# Patient Record
Sex: MALE | Race: BLACK OR AFRICAN AMERICAN | NOT HISPANIC OR LATINO | URBAN - METROPOLITAN AREA
[De-identification: names, ages, dates, MRNs, and addresses within clinical notes are randomized per-mention and may not be internally consistent; named-entity substitution may affect disease eponyms.]

---

## 2019-03-04 ENCOUNTER — INPATIENT (INPATIENT)
Facility: HOSPITAL | Age: 49
LOS: 1 days | Discharge: HOME | End: 2019-03-06
Attending: INTERNAL MEDICINE | Admitting: INTERNAL MEDICINE
Payer: MEDICAID

## 2019-03-04 VITALS
SYSTOLIC BLOOD PRESSURE: 147 MMHG | DIASTOLIC BLOOD PRESSURE: 92 MMHG | HEART RATE: 101 BPM | TEMPERATURE: 98 F | OXYGEN SATURATION: 100 % | RESPIRATION RATE: 18 BRPM

## 2019-03-04 DIAGNOSIS — R06.09 OTHER FORMS OF DYSPNEA: ICD-10-CM

## 2019-03-04 DIAGNOSIS — Z98.890 OTHER SPECIFIED POSTPROCEDURAL STATES: Chronic | ICD-10-CM

## 2019-03-04 DIAGNOSIS — D72.829 ELEVATED WHITE BLOOD CELL COUNT, UNSPECIFIED: ICD-10-CM

## 2019-03-04 DIAGNOSIS — R07.89 OTHER CHEST PAIN: ICD-10-CM

## 2019-03-04 DIAGNOSIS — R09.89 OTHER SPECIFIED SYMPTOMS AND SIGNS INVOLVING THE CIRCULATORY AND RESPIRATORY SYSTEMS: ICD-10-CM

## 2019-03-04 DIAGNOSIS — Z29.9 ENCOUNTER FOR PROPHYLACTIC MEASURES, UNSPECIFIED: ICD-10-CM

## 2019-03-04 LAB
ALBUMIN SERPL ELPH-MCNC: 4.2 G/DL — SIGNIFICANT CHANGE UP (ref 3.3–5)
ALP SERPL-CCNC: 86 U/L — SIGNIFICANT CHANGE UP (ref 40–120)
ALT FLD-CCNC: 39 U/L — SIGNIFICANT CHANGE UP (ref 4–41)
ANION GAP SERPL CALC-SCNC: 13 MMO/L — SIGNIFICANT CHANGE UP (ref 7–14)
AST SERPL-CCNC: 50 U/L — HIGH (ref 4–40)
B PERT DNA SPEC QL NAA+PROBE: NOT DETECTED — SIGNIFICANT CHANGE UP
BASOPHILS # BLD AUTO: 0.15 K/UL — SIGNIFICANT CHANGE UP (ref 0–0.2)
BASOPHILS NFR BLD AUTO: 0.8 % — SIGNIFICANT CHANGE UP (ref 0–2)
BILIRUB SERPL-MCNC: 1 MG/DL — SIGNIFICANT CHANGE UP (ref 0.2–1.2)
BUN SERPL-MCNC: 7 MG/DL — SIGNIFICANT CHANGE UP (ref 7–23)
C PNEUM DNA SPEC QL NAA+PROBE: NOT DETECTED — SIGNIFICANT CHANGE UP
CALCIUM SERPL-MCNC: 9.2 MG/DL — SIGNIFICANT CHANGE UP (ref 8.4–10.5)
CHLORIDE SERPL-SCNC: 103 MMOL/L — SIGNIFICANT CHANGE UP (ref 98–107)
CO2 SERPL-SCNC: 27 MMOL/L — SIGNIFICANT CHANGE UP (ref 22–31)
CREAT SERPL-MCNC: 1.05 MG/DL — SIGNIFICANT CHANGE UP (ref 0.5–1.3)
D DIMER BLD IA.RAPID-MCNC: 760 NG/ML — SIGNIFICANT CHANGE UP
EOSINOPHIL # BLD AUTO: 0.52 K/UL — HIGH (ref 0–0.5)
EOSINOPHIL NFR BLD AUTO: 2.7 % — SIGNIFICANT CHANGE UP (ref 0–6)
FLUAV H1 2009 PAND RNA SPEC QL NAA+PROBE: NOT DETECTED — SIGNIFICANT CHANGE UP
FLUAV H1 RNA SPEC QL NAA+PROBE: NOT DETECTED — SIGNIFICANT CHANGE UP
FLUAV H3 RNA SPEC QL NAA+PROBE: NOT DETECTED — SIGNIFICANT CHANGE UP
FLUAV SUBTYP SPEC NAA+PROBE: NOT DETECTED — SIGNIFICANT CHANGE UP
FLUBV RNA SPEC QL NAA+PROBE: NOT DETECTED — SIGNIFICANT CHANGE UP
GLUCOSE SERPL-MCNC: 102 MG/DL — HIGH (ref 70–99)
HADV DNA SPEC QL NAA+PROBE: NOT DETECTED — SIGNIFICANT CHANGE UP
HCOV PNL SPEC NAA+PROBE: SIGNIFICANT CHANGE UP
HCT VFR BLD CALC: 39.9 % — SIGNIFICANT CHANGE UP (ref 39–50)
HGB BLD-MCNC: 14.5 G/DL — SIGNIFICANT CHANGE UP (ref 13–17)
HMPV RNA SPEC QL NAA+PROBE: NOT DETECTED — SIGNIFICANT CHANGE UP
HPIV1 RNA SPEC QL NAA+PROBE: NOT DETECTED — SIGNIFICANT CHANGE UP
HPIV2 RNA SPEC QL NAA+PROBE: NOT DETECTED — SIGNIFICANT CHANGE UP
HPIV3 RNA SPEC QL NAA+PROBE: NOT DETECTED — SIGNIFICANT CHANGE UP
HPIV4 RNA SPEC QL NAA+PROBE: NOT DETECTED — SIGNIFICANT CHANGE UP
IMM GRANULOCYTES NFR BLD AUTO: 1.3 % — SIGNIFICANT CHANGE UP (ref 0–1.5)
LYMPHOCYTES # BLD AUTO: 20.1 % — SIGNIFICANT CHANGE UP (ref 13–44)
LYMPHOCYTES # BLD AUTO: 3.81 K/UL — HIGH (ref 1–3.3)
MCHC RBC-ENTMCNC: 31.4 PG — SIGNIFICANT CHANGE UP (ref 27–34)
MCHC RBC-ENTMCNC: 36.3 % — HIGH (ref 32–36)
MCV RBC AUTO: 86.4 FL — SIGNIFICANT CHANGE UP (ref 80–100)
MONOCYTES # BLD AUTO: 2.07 K/UL — HIGH (ref 0–0.9)
MONOCYTES NFR BLD AUTO: 10.9 % — SIGNIFICANT CHANGE UP (ref 2–14)
NEUTROPHILS # BLD AUTO: 12.19 K/UL — HIGH (ref 1.8–7.4)
NEUTROPHILS NFR BLD AUTO: 64.2 % — SIGNIFICANT CHANGE UP (ref 43–77)
NRBC # FLD: 0.36 K/UL — LOW (ref 25–125)
NRBC FLD-RTO: 1.9 — SIGNIFICANT CHANGE UP
NT-PROBNP SERPL-SCNC: 20.01 PG/ML — SIGNIFICANT CHANGE UP
PLATELET # BLD AUTO: 318 K/UL — SIGNIFICANT CHANGE UP (ref 150–400)
PMV BLD: 9.8 FL — SIGNIFICANT CHANGE UP (ref 7–13)
POTASSIUM SERPL-MCNC: 4.1 MMOL/L — SIGNIFICANT CHANGE UP (ref 3.5–5.3)
POTASSIUM SERPL-SCNC: 4.1 MMOL/L — SIGNIFICANT CHANGE UP (ref 3.5–5.3)
PROT SERPL-MCNC: 7.2 G/DL — SIGNIFICANT CHANGE UP (ref 6–8.3)
RBC # BLD: 4.62 M/UL — SIGNIFICANT CHANGE UP (ref 4.2–5.8)
RBC # FLD: 15.8 % — HIGH (ref 10.3–14.5)
RSV RNA SPEC QL NAA+PROBE: NOT DETECTED — SIGNIFICANT CHANGE UP
RV+EV RNA SPEC QL NAA+PROBE: NOT DETECTED — SIGNIFICANT CHANGE UP
SODIUM SERPL-SCNC: 143 MMOL/L — SIGNIFICANT CHANGE UP (ref 135–145)
TROPONIN T, HIGH SENSITIVITY: 8 NG/L — SIGNIFICANT CHANGE UP (ref ?–14)
TROPONIN T, HIGH SENSITIVITY: 9 NG/L — SIGNIFICANT CHANGE UP (ref ?–14)
WBC # BLD: 18.98 K/UL — HIGH (ref 3.8–10.5)
WBC # FLD AUTO: 18.98 K/UL — HIGH (ref 3.8–10.5)

## 2019-03-04 PROCEDURE — 71046 X-RAY EXAM CHEST 2 VIEWS: CPT | Mod: 26

## 2019-03-04 PROCEDURE — 74177 CT ABD & PELVIS W/CONTRAST: CPT | Mod: 26

## 2019-03-04 PROCEDURE — 71275 CT ANGIOGRAPHY CHEST: CPT | Mod: 26

## 2019-03-04 RX ORDER — ASPIRIN/CALCIUM CARB/MAGNESIUM 324 MG
81 TABLET ORAL DAILY
Qty: 0 | Refills: 0 | Status: DISCONTINUED | OUTPATIENT
Start: 2019-03-04 | End: 2019-03-06

## 2019-03-04 RX ORDER — ENOXAPARIN SODIUM 100 MG/ML
40 INJECTION SUBCUTANEOUS EVERY 24 HOURS
Qty: 0 | Refills: 0 | Status: DISCONTINUED | OUTPATIENT
Start: 2019-03-04 | End: 2019-03-06

## 2019-03-04 RX ORDER — ASPIRIN/CALCIUM CARB/MAGNESIUM 324 MG
325 TABLET ORAL ONCE
Qty: 0 | Refills: 0 | Status: COMPLETED | OUTPATIENT
Start: 2019-03-04 | End: 2019-03-04

## 2019-03-04 RX ORDER — PANTOPRAZOLE SODIUM 20 MG/1
40 TABLET, DELAYED RELEASE ORAL
Qty: 0 | Refills: 0 | Status: DISCONTINUED | OUTPATIENT
Start: 2019-03-04 | End: 2019-03-06

## 2019-03-04 RX ORDER — INFLUENZA VIRUS VACCINE 15; 15; 15; 15 UG/.5ML; UG/.5ML; UG/.5ML; UG/.5ML
0.5 SUSPENSION INTRAMUSCULAR ONCE
Qty: 0 | Refills: 0 | Status: COMPLETED | OUTPATIENT
Start: 2019-03-04 | End: 2019-03-06

## 2019-03-04 RX ADMIN — Medication 325 MILLIGRAM(S): at 07:59

## 2019-03-04 NOTE — H&P ADULT - NEGATIVE OPHTHALMOLOGIC SYMPTOMS
no loss of vision R/no blurred vision L/no photophobia/no pain R/no loss of vision L/no blurred vision R/no pain L/no diplopia

## 2019-03-04 NOTE — ED PROVIDER NOTE - CLINICAL SUMMARY MEDICAL DECISION MAKING FREE TEXT BOX
Pt presents with likely GERD like sx, abd pain though non-tender exam, endorsing hiccups after eating, denies any cp however has dyspnea on exertion and significant t wave inversions in lateral leads without any prior comparison. Will admit for further cardiac testing in the setting of anginal equivalent and ekg changes. HD stable, afebrile, well appearing with normal pe.  Nati Solo, PGY-2 EM

## 2019-03-04 NOTE — ED PROVIDER NOTE - NS ED ROS FT
General: No fevers / chills  HENT: No head trauma, ear pain, runny nose, or sore throat  Eyes: No visual changes  CP: +chest pain, palpitations, or lightheadedness  Resp: No shortness of breath, no cough  GI: No abdominal pain, diarrhea, constipation, nausea, or vomiting  : No urinary fz, dysuria, or hematuria  Neuro: No numbness, tingling, or weakness

## 2019-03-04 NOTE — H&P ADULT - ATTENDING COMMENTS
patient seen and evaluated.  Patient presents with complains of epigastric discomfort which has resolved and exertional shortness of breath.  Patient was noted to have leukocytosis without any evidence of infection.  His white count has decreased.  Given patient's electrocardiographic findings, I would like to ensure that there is no underlying coronary artery disease.  Will obtain a transthoracic echocardiogram and stress myocardial perfusion imaging.

## 2019-03-04 NOTE — H&P ADULT - NEUROLOGICAL DETAILS
alert and oriented x 3/sensation intact/cranial nerves intact/responds to pain/responds to verbal commands/normal strength

## 2019-03-04 NOTE — ED PROVIDER NOTE - OBJECTIVE STATEMENT
48 yo M denies pmh, last saw a pcp 1 year ago, non-smoker, no ETOH use presents 2/2 abd pain, especially after eating. Pt endorses 50 yo M denies pmh, last saw a pcp 1 year ago, non-smoker, no ETOH use presents 2/2 abd pain, especially after eating. Pt endorses the pain feels like a burning sensation after meals, believes it to be sx of regurgitation, also has hiccups regularly. Of note, pt reports dyspnea on exertion that has been going on for the last few months, used to workout regularly but no longer. Denies any chest pain or shortness of breath at rest. Denies any cardiac workup in the past including stress test. Patient denies ha, loc, back pain, n/v/d, urinary symptoms, recent travel and sickness.

## 2019-03-04 NOTE — H&P ADULT - NEGATIVE GASTROINTESTINAL SYMPTOMS
no diarrhea/no flatulence/no constipation/no abdominal pain/no melena/no hematochezia/no change in bowel habits

## 2019-03-04 NOTE — ED ADULT TRIAGE NOTE - CHIEF COMPLAINT QUOTE
p/w chest pain since Saturday.  States pain is sharp and non radiating.  denies SOB.  denies pmhx.  ekg in progress

## 2019-03-04 NOTE — H&P ADULT - PROBLEM SELECTOR PLAN 1
Dyspnea on exertion with decreased exercise tolerance  EKG with diffuse T wave inversions and ST elevation V2  Delta troponin negative  Start ASA 81mg daily  D-dimer ordered; will follow up  Given resting tachycardia, CT angio chest, abdomen and pelvis ordered to rule out PE and infection vs possibly malignancy  Echocardiogram ordered  Will consider ischemic evaluation pending above  F/U MD note

## 2019-03-04 NOTE — H&P ADULT - PROBLEM SELECTOR PLAN 3
Leukocytosis of unclear etiology  CXR without signs of infection  Pt without infectious complaints  Will send UA and RVP  Pt not septic appearing (HD stable), will hold off on blood cultures

## 2019-03-04 NOTE — ED PROVIDER NOTE - ATTENDING CONTRIBUTION TO CARE
agree with resident note  "50 yo M denies pmh, last saw a pcp 1 year ago, non-smoker, no ETOH use presents 2/2 abd pain, especially after eating. Pt endorses the pain feels like a burning sensation after meals, believes it to be sx of regurgitation, also has hiccups regularly. "  No prior cardiac workup.  States at times SOB    PE: well appearing; VSS; CTAB/L; s1 s2 no m/r/g abd soft/NT/ND ext: no edema    Imp: likely GERD but with EKG findings (concave morphology in septal leads) will admit for r/o ACS

## 2019-03-04 NOTE — H&P ADULT - HISTORY OF PRESENT ILLNESS
50 y/o male with no significant PMHx presents to ED with 50 y/o male with no significant PMHx presents to ED with epigastric pain and dyspnea on exertion. Pt reports that he has been complaining of intermittent, non-pleuritic, non-exertional, non-radiating, non-reproducible, post prandial epigastric/chest pain for the past two days, which is only affiliated with food intake. Pt does not admit to any exertional or pleuritic component, but does say that the pain is made worse when eating chicken and beef patties. Pt has never had a GI workup, including endoscopy, and never been diagnosed with GERD or PUD. Yesterday, after eating, pt had an episode of nausea with non-bloody and non-bilious vomiting. Pt also admits to worsening dyspnea on exertion over the past two months. Pt says that he is usually able to ambulate 20 blocks without a problem, but now has become short of breath after ambulating 3-4 blocks. His exercise tolerance has decreased and he feels like this is progressively getting worse. Pt denies fever, chills, sick contacts, recent travel, headache, dizziness, visual deficits, chest pain, shortness of breath at rest, cough, orthopnea, palpitations, abdominal pain, diarrhea, constipation, hematochezia, melena, dysuria, hematuria, LOC, syncope, peripheral edema. Upon arrival to ED, EKG: NSR at 97 bpm with TWI III, AVF, V3-V6, 1mm ST elevation V2. CE x2: Trop 9-->8. WBC: 18.98. AST: 50. ProBNP: 20.01. CXR: Clear lungs with trace right effusion. Pt is now admitted to telemetry.

## 2019-03-04 NOTE — H&P ADULT - NEGATIVE NEUROLOGICAL SYMPTOMS
no headache/no transient paralysis/no weakness/no focal seizures/no loss of consciousness/no hemiparesis/no confusion/no difficulty walking/no paresthesias/no syncope/no tremors/no generalized seizures/no vertigo/no loss of sensation

## 2019-03-04 NOTE — H&P ADULT - ASSESSMENT
48 y/o male with no significant PMHx presents to ED with post prandial epigastric pain radiating to the throat and dyspnea on exertion associated with an abnormal EKG.

## 2019-03-04 NOTE — ED ADULT NURSE NOTE - OBJECTIVE STATEMENT
Pt. received in room 25, A&Ox4, ambulatory. Pt. received in room 25, A&Ox4, ambulatory. Denies pmhx. Pt. c/o abdominal pain radiating to chest. Denies n/v/d, SOB, dizziness, weakness, cough, fever, chills. 20 gauge IV inserted in right ac, labs sent. Pt. placed on cardiac monitor. MD evaluation in progress. Respirations are even and unlabored on room air. Call bell within reach.

## 2019-03-04 NOTE — H&P ADULT - PROBLEM SELECTOR PLAN 2
Chest/epigastric pain concerning for GERD/PUD given post prandial component, however EKG concerning for ischemia  Delta troponin negative  Start Pantoprazole 40mg PO daily and Maalox PRN for presumed PUD  Start ASA 81mg PO daily for possible ischemia  Echocardiogram ordered  Hold off on GI consult at this time

## 2019-03-04 NOTE — H&P ADULT - NSHPLABSRESULTS_GEN_ALL_CORE
EKG: NSR at 97 bpm with TWI III, AVF, V3-V6, 1mm ST elevation V2  CE x1: Trop 9  WBC: 18.98  AST: 50  ProBNP: 20.01

## 2019-03-05 LAB
ANION GAP SERPL CALC-SCNC: 14 MMO/L — SIGNIFICANT CHANGE UP (ref 7–14)
BUN SERPL-MCNC: 9 MG/DL — SIGNIFICANT CHANGE UP (ref 7–23)
CALCIUM SERPL-MCNC: 9.1 MG/DL — SIGNIFICANT CHANGE UP (ref 8.4–10.5)
CHLORIDE SERPL-SCNC: 103 MMOL/L — SIGNIFICANT CHANGE UP (ref 98–107)
CHOLEST SERPL-MCNC: 110 MG/DL — LOW (ref 120–199)
CO2 SERPL-SCNC: 26 MMOL/L — SIGNIFICANT CHANGE UP (ref 22–31)
CREAT SERPL-MCNC: 1.03 MG/DL — SIGNIFICANT CHANGE UP (ref 0.5–1.3)
GLUCOSE SERPL-MCNC: 89 MG/DL — SIGNIFICANT CHANGE UP (ref 70–99)
HBA1C BLD-MCNC: 4 % — SIGNIFICANT CHANGE UP (ref 4–5.6)
HCT VFR BLD CALC: 40.1 % — SIGNIFICANT CHANGE UP (ref 39–50)
HDLC SERPL-MCNC: 35 MG/DL — SIGNIFICANT CHANGE UP (ref 35–55)
HGB BLD-MCNC: 14.2 G/DL — SIGNIFICANT CHANGE UP (ref 13–17)
LIPID PNL WITH DIRECT LDL SERPL: 55 MG/DL — SIGNIFICANT CHANGE UP
MAGNESIUM SERPL-MCNC: 1.7 MG/DL — SIGNIFICANT CHANGE UP (ref 1.6–2.6)
MCHC RBC-ENTMCNC: 31.6 PG — SIGNIFICANT CHANGE UP (ref 27–34)
MCHC RBC-ENTMCNC: 35.4 % — SIGNIFICANT CHANGE UP (ref 32–36)
MCV RBC AUTO: 89.3 FL — SIGNIFICANT CHANGE UP (ref 80–100)
NRBC # FLD: 0.41 K/UL — LOW (ref 25–125)
NRBC FLD-RTO: 2.6 — SIGNIFICANT CHANGE UP
PLATELET # BLD AUTO: 289 K/UL — SIGNIFICANT CHANGE UP (ref 150–400)
PMV BLD: 9.9 FL — SIGNIFICANT CHANGE UP (ref 7–13)
POTASSIUM SERPL-MCNC: 3.9 MMOL/L — SIGNIFICANT CHANGE UP (ref 3.5–5.3)
POTASSIUM SERPL-SCNC: 3.9 MMOL/L — SIGNIFICANT CHANGE UP (ref 3.5–5.3)
RBC # BLD: 4.49 M/UL — SIGNIFICANT CHANGE UP (ref 4.2–5.8)
RBC # FLD: 15.6 % — HIGH (ref 10.3–14.5)
SODIUM SERPL-SCNC: 143 MMOL/L — SIGNIFICANT CHANGE UP (ref 135–145)
TRIGL SERPL-MCNC: 226 MG/DL — HIGH (ref 10–149)
TSH SERPL-MCNC: 1.85 UIU/ML — SIGNIFICANT CHANGE UP (ref 0.27–4.2)
WBC # BLD: 15.54 K/UL — HIGH (ref 3.8–10.5)
WBC # FLD AUTO: 15.54 K/UL — HIGH (ref 3.8–10.5)

## 2019-03-05 PROCEDURE — 93018 CV STRESS TEST I&R ONLY: CPT | Mod: GC

## 2019-03-05 PROCEDURE — 93306 TTE W/DOPPLER COMPLETE: CPT | Mod: 26

## 2019-03-05 PROCEDURE — 93016 CV STRESS TEST SUPVJ ONLY: CPT | Mod: GC

## 2019-03-05 PROCEDURE — 78452 HT MUSCLE IMAGE SPECT MULT: CPT | Mod: 26

## 2019-03-05 RX ADMIN — Medication 81 MILLIGRAM(S): at 16:18

## 2019-03-05 RX ADMIN — ENOXAPARIN SODIUM 40 MILLIGRAM(S): 100 INJECTION SUBCUTANEOUS at 16:18

## 2019-03-06 ENCOUNTER — TRANSCRIPTION ENCOUNTER (OUTPATIENT)
Age: 49
End: 2019-03-06

## 2019-03-06 VITALS
SYSTOLIC BLOOD PRESSURE: 136 MMHG | HEART RATE: 86 BPM | OXYGEN SATURATION: 98 % | RESPIRATION RATE: 17 BRPM | TEMPERATURE: 98 F | DIASTOLIC BLOOD PRESSURE: 75 MMHG

## 2019-03-06 LAB
HCT VFR BLD CALC: 37.8 % — LOW (ref 39–50)
HGB BLD-MCNC: 13.7 G/DL — SIGNIFICANT CHANGE UP (ref 13–17)
MCHC RBC-ENTMCNC: 30.9 PG — SIGNIFICANT CHANGE UP (ref 27–34)
MCHC RBC-ENTMCNC: 36.2 % — HIGH (ref 32–36)
MCV RBC AUTO: 85.3 FL — SIGNIFICANT CHANGE UP (ref 80–100)
NRBC # FLD: 0.44 K/UL — LOW (ref 25–125)
NRBC FLD-RTO: 3.5 — SIGNIFICANT CHANGE UP
PLATELET # BLD AUTO: 302 K/UL — SIGNIFICANT CHANGE UP (ref 150–400)
PMV BLD: 9.7 FL — SIGNIFICANT CHANGE UP (ref 7–13)
RBC # BLD: 4.43 M/UL — SIGNIFICANT CHANGE UP (ref 4.2–5.8)
RBC # FLD: 16.2 % — HIGH (ref 10.3–14.5)
WBC # BLD: 12.67 K/UL — HIGH (ref 3.8–10.5)
WBC # FLD AUTO: 12.67 K/UL — HIGH (ref 3.8–10.5)

## 2019-03-06 RX ORDER — FAMOTIDINE 10 MG/ML
1 INJECTION INTRAVENOUS
Qty: 60 | Refills: 0 | OUTPATIENT
Start: 2019-03-06 | End: 2019-04-04

## 2019-03-06 RX ADMIN — Medication 81 MILLIGRAM(S): at 11:38

## 2019-03-06 RX ADMIN — PANTOPRAZOLE SODIUM 40 MILLIGRAM(S): 20 TABLET, DELAYED RELEASE ORAL at 05:36

## 2019-03-06 RX ADMIN — INFLUENZA VIRUS VACCINE 0.5 MILLILITER(S): 15; 15; 15; 15 SUSPENSION INTRAMUSCULAR at 16:01

## 2019-03-06 NOTE — DISCHARGE NOTE PROVIDER - CARE PROVIDER_API CALL
Dr Tena,   Phone: (   )    -  Fax: (   )    -  Follow Up Time:     Sam Borja (DO)  Cardiology; Internal Medicine  [address] [facility], [address]  [place] [address]  Phone: [phone]  Fax: [phone]  Follow Up Time:

## 2019-03-06 NOTE — DISCHARGE NOTE PROVIDER - HOSPITAL COURSE
48 y/o male with no significant PMHx presents to ED with post prandial epigastric pain radiating to the throat and dyspnea on exertion associated with an abnormal EKG.        + Post prandial epigastric pain- started Pantoprazole and Maalox PRN    + Dyspnea on exertion- EKG with diffuse TWI, CT angio negative, pending echo    + Leukocytosis- RVP, UA and CXR negative    EKG: NSR at 97 bpm with TWI III, AVF, V3-V6, 1mm ST elevation V2    CE x2: Trop 9-->8    WBC: 18.98    AST: 50    ProBNP: 20.01    D-dimer: 760    RVP: Negative        3/4 CXR: Clear lungs with trace right effusion.    3/4 CT angio chest, abdomen and pelvis: No pulmonary embolus. No acute intra-abdominal pathology.    3/5 ECHO- 1. Normal mitral valve. Minimal mitral regurgitation.    2. Normal left ventricular internal dimensions and wall    thicknesses.    3. Endocardium not well visualized; grossly normal left    ventricular systolic function.    4. The right ventricle is not well visualized; grossly    normal right ventricular systolic function.         Nuclear Stress Test-Exercise (03.05.19 @ 15:10)     PRESSIONS:Normal Study    * Myocardial Perfusion SPECT results are normal at 90 % of    MPHR.    * Review of raw data shows: The study is of good technical    quality.    * The left ventricle was normal in size. Normal myocardial    perfusion scan,with no evidence of infarction or inducible    ischemia.    * Post-stress gated wall motion analysis was performed    (LVEF = 57 %;LVEDV = 84 ml.), revealing normal LV    function. RV function appeared normal.    * Exercise capacity: 4 METS, Poor for age and gender. 90%    of MPHR.    Patient's WBC was 15.000 and may be seconodary to stress or other factors.  repeated WBC is 12.67.  Patient is stable for discharge.  Patient is hemodynamically stable and without complaints and patient is ready for discharge.  Case discussed and medications reviewed with PMD.  Agreed with above.

## 2019-03-06 NOTE — DISCHARGE NOTE NURSING/CASE MANAGEMENT/SOCIAL WORK - NSDCDPATPORTLINK_GEN_ALL_CORE
You can access the [facility] Patient Portal, offered by [facility], by registering with the following website: [email or web address]

## 2019-03-06 NOTE — DISCHARGE NOTE PROVIDER - NSDCCPCAREPLAN_GEN_ALL_CORE_FT
PRINCIPAL DISCHARGE DIAGNOSIS  Problem: Dyspnea on exertion  Assessment and Plan of Treatment: compliance with medications, follow up with physicians         SECONDARY DISCHARGE DIAGNOSES  Problem: Dyspepsia  Assessment and Plan of Treatment: Pepcid and MoM started follow up with PCP

## 2019-03-11 ENCOUNTER — EMERGENCY (EMERGENCY)
Facility: HOSPITAL | Age: 49
LOS: 1 days | Discharge: HOME | End: 2019-03-11
Attending: EMERGENCY MEDICINE | Admitting: INTERNAL MEDICINE
Payer: MEDICAID

## 2019-03-11 VITALS
OXYGEN SATURATION: 100 % | TEMPERATURE: 99 F | HEART RATE: 116 BPM | SYSTOLIC BLOOD PRESSURE: 135 MMHG | DIASTOLIC BLOOD PRESSURE: 85 MMHG | RESPIRATION RATE: 16 BRPM

## 2019-03-11 DIAGNOSIS — Z98.890 OTHER SPECIFIED POSTPROCEDURAL STATES: Chronic | ICD-10-CM

## 2019-03-11 PROCEDURE — 99285 EMERGENCY DEPT VISIT HI MDM: CPT

## 2019-03-11 NOTE — ED ADULT TRIAGE NOTE - CHIEF COMPLAINT QUOTE
Pt. with c/o constant hiccups since this morning; Pt. stated he was recently seen in ED for same complaint.

## 2019-03-12 ENCOUNTER — TRANSCRIPTION ENCOUNTER (OUTPATIENT)
Age: 49
End: 2019-03-12

## 2019-03-12 DIAGNOSIS — A41.9 SEPSIS, UNSPECIFIED ORGANISM: ICD-10-CM

## 2019-03-12 LAB
ALBUMIN SERPL ELPH-MCNC: 4.4 G/DL — SIGNIFICANT CHANGE UP (ref 3.3–5)
ALP SERPL-CCNC: 96 U/L — SIGNIFICANT CHANGE UP (ref 40–120)
ALT FLD-CCNC: 40 U/L — SIGNIFICANT CHANGE UP (ref 4–41)
ANION GAP SERPL CALC-SCNC: 14 MMO/L — SIGNIFICANT CHANGE UP (ref 7–14)
ANISOCYTOSIS BLD QL: SLIGHT — SIGNIFICANT CHANGE UP
APPEARANCE UR: CLEAR — SIGNIFICANT CHANGE UP
AST SERPL-CCNC: 47 U/L — HIGH (ref 4–40)
B PERT DNA SPEC QL NAA+PROBE: NOT DETECTED — SIGNIFICANT CHANGE UP
BASE EXCESS BLDV CALC-SCNC: 4.9 MMOL/L — SIGNIFICANT CHANGE UP
BASOPHILS # BLD AUTO: 0.1 K/UL — SIGNIFICANT CHANGE UP (ref 0–0.2)
BASOPHILS # BLD AUTO: 0.14 K/UL — SIGNIFICANT CHANGE UP (ref 0–0.2)
BASOPHILS NFR BLD AUTO: 0.7 % — SIGNIFICANT CHANGE UP (ref 0–2)
BASOPHILS NFR BLD AUTO: 0.8 % — SIGNIFICANT CHANGE UP (ref 0–2)
BASOPHILS NFR SPEC: 0 % — SIGNIFICANT CHANGE UP (ref 0–2)
BILIRUB SERPL-MCNC: 1 MG/DL — SIGNIFICANT CHANGE UP (ref 0.2–1.2)
BILIRUB UR-MCNC: NEGATIVE — SIGNIFICANT CHANGE UP
BLASTS # FLD: 0 % — SIGNIFICANT CHANGE UP (ref 0–0)
BLOOD GAS VENOUS - CREATININE: 1.12 MG/DL — SIGNIFICANT CHANGE UP (ref 0.5–1.3)
BLOOD UR QL VISUAL: NEGATIVE — SIGNIFICANT CHANGE UP
BUN SERPL-MCNC: 7 MG/DL — SIGNIFICANT CHANGE UP (ref 7–23)
C PNEUM DNA SPEC QL NAA+PROBE: NOT DETECTED — SIGNIFICANT CHANGE UP
CALCIUM SERPL-MCNC: 9.3 MG/DL — SIGNIFICANT CHANGE UP (ref 8.4–10.5)
CHLORIDE BLDV-SCNC: 105 MMOL/L — SIGNIFICANT CHANGE UP (ref 96–108)
CHLORIDE SERPL-SCNC: 98 MMOL/L — SIGNIFICANT CHANGE UP (ref 98–107)
CO2 SERPL-SCNC: 25 MMOL/L — SIGNIFICANT CHANGE UP (ref 22–31)
COLOR SPEC: YELLOW — SIGNIFICANT CHANGE UP
CREAT SERPL-MCNC: 1.13 MG/DL — SIGNIFICANT CHANGE UP (ref 0.5–1.3)
EOSINOPHIL # BLD AUTO: 0.52 K/UL — HIGH (ref 0–0.5)
EOSINOPHIL # BLD AUTO: 0.55 K/UL — HIGH (ref 0–0.5)
EOSINOPHIL NFR BLD AUTO: 3.1 % — SIGNIFICANT CHANGE UP (ref 0–6)
EOSINOPHIL NFR BLD AUTO: 3.5 % — SIGNIFICANT CHANGE UP (ref 0–6)
EOSINOPHIL NFR FLD: 4.8 % — SIGNIFICANT CHANGE UP (ref 0–6)
FLUAV H1 2009 PAND RNA SPEC QL NAA+PROBE: NOT DETECTED — SIGNIFICANT CHANGE UP
FLUAV H1 RNA SPEC QL NAA+PROBE: NOT DETECTED — SIGNIFICANT CHANGE UP
FLUAV H3 RNA SPEC QL NAA+PROBE: NOT DETECTED — SIGNIFICANT CHANGE UP
FLUAV SUBTYP SPEC NAA+PROBE: NOT DETECTED — SIGNIFICANT CHANGE UP
FLUBV RNA SPEC QL NAA+PROBE: NOT DETECTED — SIGNIFICANT CHANGE UP
GAS PNL BLDV: 135 MMOL/L — LOW (ref 136–146)
GIANT PLATELETS BLD QL SMEAR: PRESENT — SIGNIFICANT CHANGE UP
GLUCOSE BLDV-MCNC: 109 — HIGH (ref 70–99)
GLUCOSE SERPL-MCNC: 117 MG/DL — HIGH (ref 70–99)
GLUCOSE UR-MCNC: NEGATIVE — SIGNIFICANT CHANGE UP
HADV DNA SPEC QL NAA+PROBE: NOT DETECTED — SIGNIFICANT CHANGE UP
HCO3 BLDV-SCNC: 27 MMOL/L — SIGNIFICANT CHANGE UP (ref 20–27)
HCOV PNL SPEC NAA+PROBE: SIGNIFICANT CHANGE UP
HCT VFR BLD CALC: 38.3 % — LOW (ref 39–50)
HCT VFR BLD CALC: 41.1 % — SIGNIFICANT CHANGE UP (ref 39–50)
HCT VFR BLDV CALC: 41.4 % — SIGNIFICANT CHANGE UP (ref 39–51)
HGB BLD-MCNC: 13.8 G/DL — SIGNIFICANT CHANGE UP (ref 13–17)
HGB BLD-MCNC: 14.7 G/DL — SIGNIFICANT CHANGE UP (ref 13–17)
HGB BLDV-MCNC: 13.5 G/DL — SIGNIFICANT CHANGE UP (ref 13–17)
HMPV RNA SPEC QL NAA+PROBE: NOT DETECTED — SIGNIFICANT CHANGE UP
HPIV1 RNA SPEC QL NAA+PROBE: NOT DETECTED — SIGNIFICANT CHANGE UP
HPIV2 RNA SPEC QL NAA+PROBE: NOT DETECTED — SIGNIFICANT CHANGE UP
HPIV3 RNA SPEC QL NAA+PROBE: NOT DETECTED — SIGNIFICANT CHANGE UP
HPIV4 RNA SPEC QL NAA+PROBE: NOT DETECTED — SIGNIFICANT CHANGE UP
IMM GRANULOCYTES NFR BLD AUTO: 0.7 % — SIGNIFICANT CHANGE UP (ref 0–1.5)
IMM GRANULOCYTES NFR BLD AUTO: 0.9 % — SIGNIFICANT CHANGE UP (ref 0–1.5)
KETONES UR-MCNC: NEGATIVE — SIGNIFICANT CHANGE UP
LACTATE BLDV-MCNC: 1.7 MMOL/L — SIGNIFICANT CHANGE UP (ref 0.5–2)
LEUKOCYTE ESTERASE UR-ACNC: NEGATIVE — SIGNIFICANT CHANGE UP
LYMPHOCYTES # BLD AUTO: 2.99 K/UL — SIGNIFICANT CHANGE UP (ref 1–3.3)
LYMPHOCYTES # BLD AUTO: 20.1 % — SIGNIFICANT CHANGE UP (ref 13–44)
LYMPHOCYTES # BLD AUTO: 21.5 % — SIGNIFICANT CHANGE UP (ref 13–44)
LYMPHOCYTES # BLD AUTO: 3.87 K/UL — HIGH (ref 1–3.3)
LYMPHOCYTES NFR SPEC AUTO: 16.3 % — SIGNIFICANT CHANGE UP (ref 13–44)
MACROCYTES BLD QL: SLIGHT — SIGNIFICANT CHANGE UP
MCHC RBC-ENTMCNC: 31.3 PG — SIGNIFICANT CHANGE UP (ref 27–34)
MCHC RBC-ENTMCNC: 31.5 PG — SIGNIFICANT CHANGE UP (ref 27–34)
MCHC RBC-ENTMCNC: 35.8 % — SIGNIFICANT CHANGE UP (ref 32–36)
MCHC RBC-ENTMCNC: 36 % — SIGNIFICANT CHANGE UP (ref 32–36)
MCV RBC AUTO: 87.4 FL — SIGNIFICANT CHANGE UP (ref 80–100)
MCV RBC AUTO: 87.4 FL — SIGNIFICANT CHANGE UP (ref 80–100)
METAMYELOCYTES # FLD: 0 % — SIGNIFICANT CHANGE UP (ref 0–1)
MONOCYTES # BLD AUTO: 1.65 K/UL — HIGH (ref 0–0.9)
MONOCYTES # BLD AUTO: 1.95 K/UL — HIGH (ref 0–0.9)
MONOCYTES NFR BLD AUTO: 10.8 % — SIGNIFICANT CHANGE UP (ref 2–14)
MONOCYTES NFR BLD AUTO: 11.1 % — SIGNIFICANT CHANGE UP (ref 2–14)
MONOCYTES NFR BLD: 4.8 % — SIGNIFICANT CHANGE UP (ref 2–9)
MYELOCYTES NFR BLD: 1 % — HIGH (ref 0–0)
NEUTROPHIL AB SER-ACNC: 71.2 % — SIGNIFICANT CHANGE UP (ref 43–77)
NEUTROPHILS # BLD AUTO: 11.32 K/UL — HIGH (ref 1.8–7.4)
NEUTROPHILS # BLD AUTO: 9.54 K/UL — HIGH (ref 1.8–7.4)
NEUTROPHILS NFR BLD AUTO: 62.9 % — SIGNIFICANT CHANGE UP (ref 43–77)
NEUTROPHILS NFR BLD AUTO: 63.9 % — SIGNIFICANT CHANGE UP (ref 43–77)
NEUTS BAND # BLD: 0 % — SIGNIFICANT CHANGE UP (ref 0–6)
NITRITE UR-MCNC: NEGATIVE — SIGNIFICANT CHANGE UP
NRBC # BLD: 1 /100WBC — SIGNIFICANT CHANGE UP
NRBC # FLD: 0.14 K/UL — LOW (ref 25–125)
NRBC # FLD: 0.18 K/UL — LOW (ref 25–125)
NRBC FLD-RTO: 1 — SIGNIFICANT CHANGE UP
OTHER - HEMATOLOGY %: 0 — SIGNIFICANT CHANGE UP
PCO2 BLDV: 54 MMHG — HIGH (ref 41–51)
PH BLDV: 7.36 PH — SIGNIFICANT CHANGE UP (ref 7.32–7.43)
PH UR: 6 — SIGNIFICANT CHANGE UP (ref 5–8)
PLATELET # BLD AUTO: 352 K/UL — SIGNIFICANT CHANGE UP (ref 150–400)
PLATELET # BLD AUTO: 379 K/UL — SIGNIFICANT CHANGE UP (ref 150–400)
PLATELET COUNT - ESTIMATE: NORMAL — SIGNIFICANT CHANGE UP
PMV BLD: 9.9 FL — SIGNIFICANT CHANGE UP (ref 7–13)
PMV BLD: 9.9 FL — SIGNIFICANT CHANGE UP (ref 7–13)
PO2 BLDV: 30 MMHG — LOW (ref 35–40)
POIKILOCYTOSIS BLD QL AUTO: SLIGHT — SIGNIFICANT CHANGE UP
POLYCHROMASIA BLD QL SMEAR: SIGNIFICANT CHANGE UP
POTASSIUM BLDV-SCNC: 3.7 MMOL/L — SIGNIFICANT CHANGE UP (ref 3.4–4.5)
POTASSIUM SERPL-MCNC: 4.3 MMOL/L — SIGNIFICANT CHANGE UP (ref 3.5–5.3)
POTASSIUM SERPL-SCNC: 4.3 MMOL/L — SIGNIFICANT CHANGE UP (ref 3.5–5.3)
PROMYELOCYTES # FLD: 0 % — SIGNIFICANT CHANGE UP (ref 0–0)
PROT SERPL-MCNC: 7.2 G/DL — SIGNIFICANT CHANGE UP (ref 6–8.3)
PROT UR-MCNC: NEGATIVE — SIGNIFICANT CHANGE UP
RBC # BLD: 4.38 M/UL — SIGNIFICANT CHANGE UP (ref 4.2–5.8)
RBC # BLD: 4.7 M/UL — SIGNIFICANT CHANGE UP (ref 4.2–5.8)
RBC # FLD: 15.6 % — HIGH (ref 10.3–14.5)
RBC # FLD: 15.7 % — HIGH (ref 10.3–14.5)
RBC CASTS # UR COMP ASSIST: SIGNIFICANT CHANGE UP (ref 0–?)
RSV RNA SPEC QL NAA+PROBE: NOT DETECTED — SIGNIFICANT CHANGE UP
RV+EV RNA SPEC QL NAA+PROBE: DETECTED — HIGH
SAO2 % BLDV: 44.8 % — LOW (ref 60–85)
SMUDGE CELLS # BLD: PRESENT — SIGNIFICANT CHANGE UP
SODIUM SERPL-SCNC: 137 MMOL/L — SIGNIFICANT CHANGE UP (ref 135–145)
SP GR SPEC: 1.01 — SIGNIFICANT CHANGE UP (ref 1–1.04)
T4 FREE SERPL-MCNC: 1.14 NG/DL — SIGNIFICANT CHANGE UP (ref 0.9–1.8)
TARGETS BLD QL SMEAR: SIGNIFICANT CHANGE UP
TROPONIN T, HIGH SENSITIVITY: 10 NG/L — SIGNIFICANT CHANGE UP (ref ?–14)
TROPONIN T, HIGH SENSITIVITY: 9 NG/L — SIGNIFICANT CHANGE UP (ref ?–14)
TSH SERPL-MCNC: 1.11 UIU/ML — SIGNIFICANT CHANGE UP (ref 0.27–4.2)
UROBILINOGEN FLD QL: NORMAL — SIGNIFICANT CHANGE UP
VARIANT LYMPHS # BLD: 1.9 % — SIGNIFICANT CHANGE UP
WBC # BLD: 14.91 K/UL — HIGH (ref 3.8–10.5)
WBC # BLD: 17.99 K/UL — HIGH (ref 3.8–10.5)
WBC # FLD AUTO: 14.91 K/UL — HIGH (ref 3.8–10.5)
WBC # FLD AUTO: 17.99 K/UL — HIGH (ref 3.8–10.5)
WBC UR QL: SIGNIFICANT CHANGE UP (ref 0–?)

## 2019-03-12 RX ORDER — SODIUM CHLORIDE 9 MG/ML
1000 INJECTION INTRAMUSCULAR; INTRAVENOUS; SUBCUTANEOUS ONCE
Qty: 0 | Refills: 0 | Status: COMPLETED | OUTPATIENT
Start: 2019-03-12 | End: 2019-03-12

## 2019-03-12 RX ORDER — VANCOMYCIN HCL 1 G
1000 VIAL (EA) INTRAVENOUS ONCE
Qty: 0 | Refills: 0 | Status: COMPLETED | OUTPATIENT
Start: 2019-03-12 | End: 2019-03-12

## 2019-03-12 RX ORDER — PIPERACILLIN AND TAZOBACTAM 4; .5 G/20ML; G/20ML
3.38 INJECTION, POWDER, LYOPHILIZED, FOR SOLUTION INTRAVENOUS ONCE
Qty: 0 | Refills: 0 | Status: COMPLETED | OUTPATIENT
Start: 2019-03-12 | End: 2019-03-12

## 2019-03-12 RX ORDER — ACETAMINOPHEN 500 MG
650 TABLET ORAL EVERY 6 HOURS
Qty: 0 | Refills: 0 | Status: DISCONTINUED | OUTPATIENT
Start: 2019-03-12 | End: 2019-03-13

## 2019-03-12 RX ORDER — ACETAMINOPHEN 500 MG
1000 TABLET ORAL ONCE
Qty: 0 | Refills: 0 | Status: COMPLETED | OUTPATIENT
Start: 2019-03-12 | End: 2019-03-12

## 2019-03-12 RX ORDER — BENZOCAINE AND MENTHOL 5; 1 G/100ML; G/100ML
1 LIQUID ORAL EVERY 4 HOURS
Qty: 0 | Refills: 0 | Status: DISCONTINUED | OUTPATIENT
Start: 2019-03-12 | End: 2019-03-13

## 2019-03-12 RX ADMIN — SODIUM CHLORIDE 1000 MILLILITER(S): 9 INJECTION INTRAMUSCULAR; INTRAVENOUS; SUBCUTANEOUS at 03:52

## 2019-03-12 RX ADMIN — Medication 1000 MILLIGRAM(S): at 04:35

## 2019-03-12 RX ADMIN — PIPERACILLIN AND TAZOBACTAM 3.38 GRAM(S): 4; .5 INJECTION, POWDER, LYOPHILIZED, FOR SOLUTION INTRAVENOUS at 04:35

## 2019-03-12 RX ADMIN — SODIUM CHLORIDE 1000 MILLILITER(S): 9 INJECTION INTRAMUSCULAR; INTRAVENOUS; SUBCUTANEOUS at 06:00

## 2019-03-12 RX ADMIN — PIPERACILLIN AND TAZOBACTAM 200 GRAM(S): 4; .5 INJECTION, POWDER, LYOPHILIZED, FOR SOLUTION INTRAVENOUS at 03:52

## 2019-03-12 RX ADMIN — Medication 250 MILLIGRAM(S): at 04:47

## 2019-03-12 RX ADMIN — Medication 1000 MILLIGRAM(S): at 04:08

## 2019-03-12 RX ADMIN — Medication 400 MILLIGRAM(S): at 03:52

## 2019-03-12 NOTE — H&P ADULT - HISTORY OF PRESENT ILLNESS
50 yo M w/ no significant PMH p/w ED w/ hiccups x 1 day. He also endorses mild sore throat. Pt states hiccups have mostly resolved now.   Pt denies fever, chills, headache, dizziness, chest pain, sob, BRIZUELA, cough, n/v, abdominal pain, diarrhea, constipation, dysuria.   Pt was admitted from 3/4-3/6 on telemetry for epigastric pain/BRIZUELA/abnormal EKG - w/u was negative, including ACS, CT C/A/P, 2D echo, and stress test. Pt also had leukocytosis that admission that downtrended spontaneously.     In the ED, found to have Tm 100.4,  -> both resolved.   WBC 12->18 on repeat. UA neg, CXR neg.  Blood cultures were sent, and pt given Vanc/Zosyn, 1L NS bolus, Tylenol IV.   RVP +entero/rhinovirus

## 2019-03-12 NOTE — DISCHARGE NOTE PROVIDER - NSDCHC_MEDRECSTATUS_GEN_ALL_CORE
Admission Reconciliation is Not Complete  Discharge Reconciliation is Not Complete Admission Reconciliation is Not Complete  Discharge Reconciliation is Completed

## 2019-03-12 NOTE — ED ADULT NURSE NOTE - OBJECTIVE STATEMENT
Pt brought into intake 7. A&OX4 ambulatory self care male presents to the ed today for hiccups since today. Patient was treated for cp/ abd pain. on 3/4-3/6. Patient febrile and tachy upon arrival to intake. Patient admitted to medicine for sepsis. 18G IV placed in left ac. RN report given to Razia. Vitals as noted.

## 2019-03-12 NOTE — ED ADULT NURSE REASSESSMENT NOTE - NS ED NURSE REASSESS COMMENT FT1
Report received from KAREN Carter. Pt. received in intake room 6. No acute distress at present. Respirations are even and unlabored on room air. Pt. is admitted to Medicine, report given to [facility] KAREN Nagel. Pt. will be transported to [address] [place] [address]. Pt. made aware he will be going to a [facility]. Awaiting transport.

## 2019-03-12 NOTE — ED PROVIDER NOTE - OBJECTIVE STATEMENT
48yo M w/ no significant pmhx presents to the ED c/o hiccups x today, pt was admitted here from March 4-6th for work-up of chest pain/abd intake. Pt does not admit to any exertional or pleuritic component, but does say that the pain is made worse when eating chicken and beef patties. Pt has never had a GI workup, including endoscopy, and never been diagnosed with GERD or PUD. Yesterday, after eating, pt had an episode of nausea with non-bloody and non-bilious vomiting. Pt also admits to worsening dyspnea on exertion over the past two months. Pt says that he is usually able to ambulate 20 blocks without a problem, but now has become short of breath after ambulating 3-4 blocks. His exercise tolerance has decreased and he feels like this is progressively getting worse. Pt denies fever, chills, sick contacts, recent travel, headache, dizziness, visual deficits, chest pain, shortness of breath at rest, cough, orthopnea, palpitations, abdominal pain, diarrhea, constipation, hematochezia, melena, dysuria, hematuria, LOC, syncope, peripheral edema. Upon arrival to ED, EKG: NSR at 97 bpm with TWI III, AVF, V3-V6, 1mm ST elevation V2. CE x2: Trop 9-->8. WBC: 18.98. AST: 50. ProBNP: 20.01. CXR: Clear lungs with trace right effusion. Pt is now admitted to telemetry. 50yo M w/ no significant pmhx presents to the ED c/o hiccups x today, pt was admitted here from March 4-6th for work-up of chest pain/abd intake. Pt denies fever, chills, sick contacts, recent travel, headache, dizziness, visual deficits, chest pain, shortness of breath at rest, cough, orthopnea, palpitations, abdominal pain, diarrhea, constipation, hematochezia, melena, dysuria, hematuria, LOC, syncope, peripheral edema. Pt had work-up inclusive of stress test, TTE and CT angio of chest all of which were non-diagnostic.

## 2019-03-12 NOTE — DISCHARGE NOTE PROVIDER - CARE PROVIDER_API CALL
PMD,   call for appointment  Phone: (   )    -  Fax: (   )    -  Follow Up Time: PMD,   call for appointment  Phone: (   )    -  Fax: (   )    -  Follow Up Time:     Papa Parra  [facility]  [address] [facility] [address] [place] [address]  Phone: (   )    -  Fax: (   )    -  Follow Up Time:

## 2019-03-12 NOTE — DISCHARGE NOTE PROVIDER - NSDCCPCAREPLAN_GEN_ALL_CORE_FT
PRINCIPAL DISCHARGE DIAGNOSIS  Problem: Sepsis  Assessment and Plan of Treatment: PRINCIPAL DISCHARGE DIAGNOSIS  Problem: Sepsis  Assessment and Plan of Treatment: your workup noted viral infection with rhinovirus/enter (cold). please continue supportive care and followup with PMD in 2 week PRINCIPAL DISCHARGE DIAGNOSIS  Problem: Sepsis  Assessment and Plan of Treatment: your workup noted viral infection with rhinovirus/enter (cold). please continue supportive care and followup with PMDwithin 1 week.  Please follow up and get a repeat white blood cell count as it was elevated. If it remains elevated you should see a hematologist.

## 2019-03-12 NOTE — DISCHARGE NOTE PROVIDER - HOSPITAL COURSE
50 yo M w/ no significant PMH p/w ED w/ hiccups x 1 day. He also endorses mild sore throat. Pt states hiccups have mostly resolved now. Pt denies fever, chills, headache, dizziness, chest pain, sob, BRIZUELA, cough, n/v, abdominal pain, diarrhea, constipation, dysuria. Pt was admitted from 3/4-3/6 on telemetry for epigastric pain/BRIZUELA/abnormal EKG - w/u was negative, including ACS, CT C/A/P, 2D echo, and stress test. Pt also had leukocytosis that admission that downtrended spontaneously.         Hospital course:    trop 9>10    UA normal    RVP enter/rhinovirus +    Blood Cx *****        Pt presented with concern for SIRS given IV Van and zosyn X1 in ED. Blood culture ****. UA negative. RVP positive with enter/rhinovirus. Fever/leukocytosis likely due to viral infection. WBC downtrending 50 yo M w/ no significant PMH p/w ED w/ hiccups x 1 day. He also endorses mild sore throat. Pt states hiccups have mostly resolved now. Pt denies fever, chills, headache, dizziness, chest pain, sob, BRIZUELA, cough, n/v, abdominal pain, diarrhea, constipation, dysuria. Pt was admitted from 3/4-3/6 on telemetry for epigastric pain/BRIZUELA/abnormal EKG - w/u was negative, including ACS, CT C/A/P, 2D echo, and stress test. Pt also had leukocytosis that admission that downtrended spontaneously.         Hospital course:    trop 9>10    UA normal    RVP enter/rhinovirus +    Blood Cx *****        Pt presented with concern for SIRS given IV Van and zosyn X1 in ED. Blood culture neg. UA negative. RVP positive with enter/rhinovirus. Fever/leukocytosis likely due to viral infection. WBC downtrending         Discussed with MD - pt stable for discharge home, pt with no complaints, discharge medications reviewed with MD. 48 yo M w/ no significant PMH p/w ED w/ hiccups x 1 day. He also endorses mild sore throat. Pt states hiccups have mostly resolved now. Pt denies fever, chills, headache, dizziness, chest pain, sob, BRIZUELA, cough, n/v, abdominal pain, diarrhea, constipation, dysuria. Pt was admitted from 3/4-3/6 on telemetry for epigastric pain/BRIZUELA/abnormal EKG - w/u was negative, including ACS, CT C/A/P, 2D echo, and stress test. Pt also had leukocytosis that admission that downtrended spontaneously.         Hospital course:    trop 9>10    UA normal    RVP enter/rhinovirus +    Blood Cx *****        Pt presented with concern for SIRS given IV Van and zosyn X1 in ED. Blood culture neg. UA negative. RVP positive with enter/rhinovirus. Fever/leukocytosis likely due to viral infection. WBC downtrending         Discussed with MD - pt stable for discharge home, pt with no complaints.        Patient needs to follow up with PMD for resolvement of leukocytosis or further workup with hematologist if does not resolve given smudge cells on this admission. 48 yo M w/ no significant PMH p/w ED w/ hiccups x 1 day. He also endorses mild sore throat. Pt states hiccups have mostly resolved now. Pt denies fever, chills, headache, dizziness, chest pain, sob, BRIZUELA, cough, n/v, abdominal pain, diarrhea, constipation, dysuria. Pt was admitted from 3/4-3/6 on telemetry for epigastric pain/BRIZUELA/abnormal EKG - w/u was negative, including ACS, CT C/A/P, 2D echo, and stress test. Pt also had leukocytosis that admission that downtrended spontaneously.         Hospital course:    trop 9>10    UA normal    RVP enter/rhinovirus +    Blood Cx with no growth to date.         Pt presented with concern for SIRS given IV Van and zosyn X1 in ED. Blood culture neg. UA negative. RVP positive with enter/rhinovirus. Fever/leukocytosis likely due to viral infection. WBC downtrending         Discussed with MD - pt stable for discharge home, pt with no complaints.        Patient needs to follow up with PMD for resolvement of leukocytosis or further workup with hematologist if does not resolve given smudge cells on this admission.         If your hiccups return, please take over the counter omeprazole and see your primary care physician.  After discussion with you we will send an email to the [facility] ambulatory center who judit email you with an apointment for follow up.

## 2019-03-12 NOTE — DISCHARGE NOTE PROVIDER - PROVIDER TOKENS
FREE:[LAST:[PMD],PHONE:[(   )    -],FAX:[(   )    -],ADDRESS:[call for appointment]] FREE:[LAST:[PMD],PHONE:[(   )    -],FAX:[(   )    -],ADDRESS:[call for appointment]],FREE:[LAST:[Malakov],FIRST:[Papa],PHONE:[(   )    -],FAX:[(   )    -],ADDRESS:[[address] [place] [id number]]]

## 2019-03-12 NOTE — DISCHARGE NOTE PROVIDER - NSDCFUADDAPPT_GEN_ALL_CORE_FT
follow up with your PMD in one week - call for appointment follow up with your PMD in one week - call for appointment  Please have your white blood cell count rechecked to ensure resolution of leukocytosis. If it is still high, you may need further evaluation given smudge cells on automated smear. \    If you have recurrent hiccups, please take over the counter omeprazole and see your PMD.

## 2019-03-12 NOTE — DISCHARGE NOTE PROVIDER - NSFOLLOWUPCLINICS_GEN_ALL_ED_FT
[facility]  Internal Medicine  [id number] [place] [address]  Phone: [phone]  Fax:   Follow Up Time:

## 2019-03-12 NOTE — ED PROVIDER NOTE - PROGRESS NOTE DETAILS
LUCAS Young- received sign out pending repeat troponin and revital. plan to dc home as per LUCAS Kapoor and Dr. Kelly if feeling better and vitals improve. will check ua, rectal temp, repeat troponin, reassess. rectal temp 100.4, tachy 112. will give broad spectrum abx. admit. blood culture sent. LUCAS Young- spoke with hospitalist Dr. Reyes. NAZANIN lyle Klepfish: received s/o. given vague symptoms, persistent tachy, will admit for further care (as per initial team plan). delta trop <3.

## 2019-03-12 NOTE — H&P ADULT - NSICDXPROBLEM_GEN_ALL_CORE_FT
PROBLEM DIAGNOSES  Problem: Sepsis  Assessment and Plan: sepsis POA w/ fever, tachycardia, leukocytosis   likely 2/2 entero/rhinovirus infection  sepsis resolved, still w/ leukocytosis  c/w supportive care  f/u blood cultures       R/O PROBLEM DIAGNOSES  Problem: Prophylactic measure  Assessment and Plan: young and ambulatory, no need for dvt ppx   encourage ambulation

## 2019-03-12 NOTE — H&P ADULT - NSHPLABSRESULTS_GEN_ALL_CORE
CBC Full  -  ( 12 Mar 2019 13:54 )  WBC Count : 14.91 K/uL  Hemoglobin : 13.8 g/dL  Hematocrit : 38.3 %  Platelet Count - Automated : 352 K/uL  Mean Cell Volume : 87.4 fL  Mean Cell Hemoglobin : 31.5 pg  Mean Cell Hemoglobin Concentration : 36.0 %  Auto Neutrophil # : 9.54 K/uL  Auto Lymphocyte # : 2.99 K/uL  Auto Monocyte # : 1.65 K/uL  Auto Eosinophil # : 0.52 K/uL  Auto Basophil # : 0.10 K/uL  Auto Neutrophil % : 63.9 %  Auto Lymphocyte % : 20.1 %  Auto Monocyte % : 11.1 %  Auto Eosinophil % : 3.5 %  Auto Basophil % : 0.7 %      03-12    137  |  98  |  7   ----------------------------<  117<H>  4.3   |  25  |  1.13    Ca    9.3      12 Mar 2019 01:00    TPro  7.2  /  Alb  4.4  /  TBili  1.0  /  DBili  x   /  AST  47<H>  /  ALT  40  /  AlkPhos  96  03-12

## 2019-03-12 NOTE — ED PROVIDER NOTE - ATTENDING CONTRIBUTION TO CARE
agree with PA note  "48yo M w/ no significant pmhx presents to the ED c/o hiccups x today, pt was admitted here from March 4-6th for work-up of chest pain/abd intake. Pt denies fever, chills, sick contacts, recent travel, headache, dizziness, visual deficits, chest pain, shortness of breath at rest, cough, orthopnea, palpitations, abdominal pain, diarrhea, constipation, hematochezia, melena, dysuria, hematuria, LOC, syncope, peripheral edema. "    PE: febrile, tachycardic, eyes injected, no hiccups while in room; not confused but at times not answering questions appropriately; CTAB/L; s1 s 2no m/r/g abd soft/NT/ND ext: no edema

## 2019-03-13 ENCOUNTER — TRANSCRIPTION ENCOUNTER (OUTPATIENT)
Age: 49
End: 2019-03-13

## 2019-03-13 VITALS
HEART RATE: 100 BPM | SYSTOLIC BLOOD PRESSURE: 119 MMHG | RESPIRATION RATE: 17 BRPM | TEMPERATURE: 98 F | OXYGEN SATURATION: 97 % | DIASTOLIC BLOOD PRESSURE: 74 MMHG

## 2019-03-13 LAB
ANION GAP SERPL CALC-SCNC: 12 MMO/L — SIGNIFICANT CHANGE UP (ref 7–14)
BASOPHILS # BLD AUTO: 0.1 K/UL — SIGNIFICANT CHANGE UP (ref 0–0.2)
BASOPHILS NFR BLD AUTO: 0.8 % — SIGNIFICANT CHANGE UP (ref 0–2)
BUN SERPL-MCNC: 8 MG/DL — SIGNIFICANT CHANGE UP (ref 7–23)
CALCIUM SERPL-MCNC: 9.5 MG/DL — SIGNIFICANT CHANGE UP (ref 8.4–10.5)
CHLORIDE SERPL-SCNC: 104 MMOL/L — SIGNIFICANT CHANGE UP (ref 98–107)
CO2 SERPL-SCNC: 24 MMOL/L — SIGNIFICANT CHANGE UP (ref 22–31)
CREAT SERPL-MCNC: 1.03 MG/DL — SIGNIFICANT CHANGE UP (ref 0.5–1.3)
EOSINOPHIL # BLD AUTO: 0.55 K/UL — HIGH (ref 0–0.5)
EOSINOPHIL NFR BLD AUTO: 4.1 % — SIGNIFICANT CHANGE UP (ref 0–6)
GLUCOSE SERPL-MCNC: 94 MG/DL — SIGNIFICANT CHANGE UP (ref 70–99)
HCT VFR BLD CALC: 37.3 % — LOW (ref 39–50)
HGB BLD-MCNC: 13.6 G/DL — SIGNIFICANT CHANGE UP (ref 13–17)
IMM GRANULOCYTES NFR BLD AUTO: 0.8 % — SIGNIFICANT CHANGE UP (ref 0–1.5)
LYMPHOCYTES # BLD AUTO: 27.5 % — SIGNIFICANT CHANGE UP (ref 13–44)
LYMPHOCYTES # BLD AUTO: 3.66 K/UL — HIGH (ref 1–3.3)
MAGNESIUM SERPL-MCNC: 2.1 MG/DL — SIGNIFICANT CHANGE UP (ref 1.6–2.6)
MCHC RBC-ENTMCNC: 31.3 PG — SIGNIFICANT CHANGE UP (ref 27–34)
MCHC RBC-ENTMCNC: 36.5 % — HIGH (ref 32–36)
MCV RBC AUTO: 85.9 FL — SIGNIFICANT CHANGE UP (ref 80–100)
MONOCYTES # BLD AUTO: 1.66 K/UL — HIGH (ref 0–0.9)
MONOCYTES NFR BLD AUTO: 12.5 % — SIGNIFICANT CHANGE UP (ref 2–14)
NEUTROPHILS # BLD AUTO: 7.23 K/UL — SIGNIFICANT CHANGE UP (ref 1.8–7.4)
NEUTROPHILS NFR BLD AUTO: 54.3 % — SIGNIFICANT CHANGE UP (ref 43–77)
NRBC # FLD: 0.16 K/UL — LOW (ref 25–125)
NRBC FLD-RTO: 1.2 — SIGNIFICANT CHANGE UP
PLATELET # BLD AUTO: 348 K/UL — SIGNIFICANT CHANGE UP (ref 150–400)
PMV BLD: 10 FL — SIGNIFICANT CHANGE UP (ref 7–13)
POTASSIUM SERPL-MCNC: 4.6 MMOL/L — SIGNIFICANT CHANGE UP (ref 3.5–5.3)
POTASSIUM SERPL-SCNC: 4.6 MMOL/L — SIGNIFICANT CHANGE UP (ref 3.5–5.3)
RBC # BLD: 4.34 M/UL — SIGNIFICANT CHANGE UP (ref 4.2–5.8)
RBC # FLD: 15.6 % — HIGH (ref 10.3–14.5)
SODIUM SERPL-SCNC: 140 MMOL/L — SIGNIFICANT CHANGE UP (ref 135–145)
SPECIMEN SOURCE: SIGNIFICANT CHANGE UP
SPECIMEN SOURCE: SIGNIFICANT CHANGE UP
WBC # BLD: 13.31 K/UL — HIGH (ref 3.8–10.5)
WBC # FLD AUTO: 13.31 K/UL — HIGH (ref 3.8–10.5)

## 2019-03-13 RX ORDER — ACETAMINOPHEN 500 MG
2 TABLET ORAL
Qty: 0 | Refills: 0 | DISCHARGE
Start: 2019-03-13

## 2019-03-13 NOTE — DISCHARGE NOTE NURSING/CASE MANAGEMENT/SOCIAL WORK - NSDCDPATPORTLINK_GEN_ALL_CORE
You can access the [facility] Patient Portal, offered by [facility], by registering with the following website: [email or web address]

## 2019-03-13 NOTE — CHART NOTE - NSCHARTNOTEFT_GEN_A_CORE
Patient seen and evaluated. Hiccups resolved. No current symptoms.  Patient with downtrending leukocytosis. enterovirus positive.  Afebrile, saturating well on RA with clear CXR. Patient stbale for discharge. Has no PMD, last physician was physiatrist fro lower back pain. Will send email to [facility] ambulatory center to email patient for appointment. NEEDS follow up with CBC given smudge cells and leukocytosis from last admission to ensure resolves. If not may need hematology follow up.    D/C planning 31 minutes.

## 2019-03-14 PROBLEM — Z00.00 ENCOUNTER FOR PREVENTIVE HEALTH EXAMINATION: Status: ACTIVE | Noted: 2019-03-14

## 2019-03-17 LAB
BACTERIA BLD CULT: SIGNIFICANT CHANGE UP
BACTERIA BLD CULT: SIGNIFICANT CHANGE UP

## 2019-03-28 ENCOUNTER — APPOINTMENT (OUTPATIENT)
Dept: INTERNAL MEDICINE | Facility: HOSPITAL | Age: 49
End: 2019-03-28

## 2020-02-10 ENCOUNTER — INPATIENT (INPATIENT)
Facility: HOSPITAL | Age: 50
LOS: 3 days | Discharge: HOME | End: 2020-02-14
Attending: INTERNAL MEDICINE | Admitting: INTERNAL MEDICINE
Payer: MEDICAID

## 2020-02-10 VITALS
OXYGEN SATURATION: 90 % | DIASTOLIC BLOOD PRESSURE: 79 MMHG | SYSTOLIC BLOOD PRESSURE: 135 MMHG | HEART RATE: 106 BPM | RESPIRATION RATE: 24 BRPM | TEMPERATURE: 98 F | WEIGHT: 189.6 LBS

## 2020-02-10 DIAGNOSIS — N17.9 ACUTE KIDNEY FAILURE, UNSPECIFIED: ICD-10-CM

## 2020-02-10 DIAGNOSIS — D72.829 ELEVATED WHITE BLOOD CELL COUNT, UNSPECIFIED: ICD-10-CM

## 2020-02-10 DIAGNOSIS — I26.99 OTHER PULMONARY EMBOLISM WITHOUT ACUTE COR PULMONALE: ICD-10-CM

## 2020-02-10 DIAGNOSIS — Z98.890 OTHER SPECIFIED POSTPROCEDURAL STATES: Chronic | ICD-10-CM

## 2020-02-10 DIAGNOSIS — D64.9 ANEMIA, UNSPECIFIED: ICD-10-CM

## 2020-02-10 DIAGNOSIS — Z29.9 ENCOUNTER FOR PROPHYLACTIC MEASURES, UNSPECIFIED: ICD-10-CM

## 2020-02-10 DIAGNOSIS — Z02.9 ENCOUNTER FOR ADMINISTRATIVE EXAMINATIONS, UNSPECIFIED: ICD-10-CM

## 2020-02-10 DIAGNOSIS — I51.7 CARDIOMEGALY: ICD-10-CM

## 2020-02-10 DIAGNOSIS — R59.1 GENERALIZED ENLARGED LYMPH NODES: ICD-10-CM

## 2020-02-10 DIAGNOSIS — R09.89 OTHER SPECIFIED SYMPTOMS AND SIGNS INVOLVING THE CIRCULATORY AND RESPIRATORY SYSTEMS: ICD-10-CM

## 2020-02-10 LAB
ALBUMIN SERPL ELPH-MCNC: 4.2 G/DL — SIGNIFICANT CHANGE UP (ref 3.3–5)
ALP SERPL-CCNC: 69 U/L — SIGNIFICANT CHANGE UP (ref 40–120)
ALT FLD-CCNC: 20 U/L — SIGNIFICANT CHANGE UP (ref 4–41)
ANION GAP SERPL CALC-SCNC: 10 MMO/L — SIGNIFICANT CHANGE UP (ref 7–14)
APTT BLD: 25.9 SEC — LOW (ref 27.5–36.3)
APTT BLD: 69 SEC — HIGH (ref 27.5–36.3)
APTT BLD: 77.1 SEC — HIGH (ref 27.5–36.3)
AST SERPL-CCNC: 35 U/L — SIGNIFICANT CHANGE UP (ref 4–40)
B PERT DNA SPEC QL NAA+PROBE: NOT DETECTED — SIGNIFICANT CHANGE UP
BASE EXCESS BLDV CALC-SCNC: 2.2 MMOL/L — SIGNIFICANT CHANGE UP
BASOPHILS # BLD AUTO: 0.12 K/UL — SIGNIFICANT CHANGE UP (ref 0–0.2)
BASOPHILS NFR BLD AUTO: 0.7 % — SIGNIFICANT CHANGE UP (ref 0–2)
BILIRUB SERPL-MCNC: 1.6 MG/DL — HIGH (ref 0.2–1.2)
BLOOD GAS VENOUS - FIO2: 21 — SIGNIFICANT CHANGE UP
BUN SERPL-MCNC: 7 MG/DL — SIGNIFICANT CHANGE UP (ref 7–23)
C PNEUM DNA SPEC QL NAA+PROBE: NOT DETECTED — SIGNIFICANT CHANGE UP
CALCIUM SERPL-MCNC: 9.2 MG/DL — SIGNIFICANT CHANGE UP (ref 8.4–10.5)
CHLORIDE BLDV-SCNC: 108 MMOL/L — SIGNIFICANT CHANGE UP (ref 96–108)
CHLORIDE SERPL-SCNC: 105 MMOL/L — SIGNIFICANT CHANGE UP (ref 98–107)
CO2 SERPL-SCNC: 24 MMOL/L — SIGNIFICANT CHANGE UP (ref 22–31)
CREAT SERPL-MCNC: 1.23 MG/DL — SIGNIFICANT CHANGE UP (ref 0.5–1.3)
D DIMER BLD IA.RAPID-MCNC: 772 NG/ML — SIGNIFICANT CHANGE UP
EOSINOPHIL # BLD AUTO: 0.46 K/UL — SIGNIFICANT CHANGE UP (ref 0–0.5)
EOSINOPHIL NFR BLD AUTO: 2.7 % — SIGNIFICANT CHANGE UP (ref 0–6)
FLUAV H1 2009 PAND RNA SPEC QL NAA+PROBE: NOT DETECTED — SIGNIFICANT CHANGE UP
FLUAV H1 RNA SPEC QL NAA+PROBE: NOT DETECTED — SIGNIFICANT CHANGE UP
FLUAV H3 RNA SPEC QL NAA+PROBE: NOT DETECTED — SIGNIFICANT CHANGE UP
FLUAV SUBTYP SPEC NAA+PROBE: NOT DETECTED — SIGNIFICANT CHANGE UP
FLUBV RNA SPEC QL NAA+PROBE: NOT DETECTED — SIGNIFICANT CHANGE UP
GAS PNL BLDV: 147 MMOL/L — HIGH (ref 136–146)
GLUCOSE BLDV-MCNC: 94 MG/DL — SIGNIFICANT CHANGE UP (ref 70–99)
GLUCOSE SERPL-MCNC: 88 MG/DL — SIGNIFICANT CHANGE UP (ref 70–99)
HADV DNA SPEC QL NAA+PROBE: NOT DETECTED — SIGNIFICANT CHANGE UP
HCO3 BLDV-SCNC: 25 MMOL/L — SIGNIFICANT CHANGE UP (ref 20–27)
HCOV PNL SPEC NAA+PROBE: SIGNIFICANT CHANGE UP
HCT VFR BLD CALC: 33.9 % — LOW (ref 39–50)
HCT VFR BLD CALC: 34.3 % — LOW (ref 39–50)
HCT VFR BLD CALC: 35.2 % — LOW (ref 39–50)
HCT VFR BLDV CALC: 39.6 % — SIGNIFICANT CHANGE UP (ref 39–51)
HGB BLD-MCNC: 11.9 G/DL — LOW (ref 13–17)
HGB BLD-MCNC: 12.3 G/DL — LOW (ref 13–17)
HGB BLD-MCNC: 12.6 G/DL — LOW (ref 13–17)
HGB BLDV-MCNC: 12.9 G/DL — LOW (ref 13–17)
HMPV RNA SPEC QL NAA+PROBE: NOT DETECTED — SIGNIFICANT CHANGE UP
HPIV1 RNA SPEC QL NAA+PROBE: NOT DETECTED — SIGNIFICANT CHANGE UP
HPIV2 RNA SPEC QL NAA+PROBE: NOT DETECTED — SIGNIFICANT CHANGE UP
HPIV3 RNA SPEC QL NAA+PROBE: NOT DETECTED — SIGNIFICANT CHANGE UP
HPIV4 RNA SPEC QL NAA+PROBE: NOT DETECTED — SIGNIFICANT CHANGE UP
IMM GRANULOCYTES NFR BLD AUTO: 0.6 % — SIGNIFICANT CHANGE UP (ref 0–1.5)
INR BLD: 1.22 — HIGH (ref 0.88–1.17)
LACTATE BLDV-MCNC: 1.7 MMOL/L — SIGNIFICANT CHANGE UP (ref 0.5–2)
LDH SERPL L TO P-CCNC: 429 U/L — HIGH (ref 135–225)
LYMPHOCYTES # BLD AUTO: 22.6 % — SIGNIFICANT CHANGE UP (ref 13–44)
LYMPHOCYTES # BLD AUTO: 3.85 K/UL — HIGH (ref 1–3.3)
MAGNESIUM SERPL-MCNC: 2 MG/DL — SIGNIFICANT CHANGE UP (ref 1.6–2.6)
MCHC RBC-ENTMCNC: 28.3 PG — SIGNIFICANT CHANGE UP (ref 27–34)
MCHC RBC-ENTMCNC: 28.5 PG — SIGNIFICANT CHANGE UP (ref 27–34)
MCHC RBC-ENTMCNC: 28.8 PG — SIGNIFICANT CHANGE UP (ref 27–34)
MCHC RBC-ENTMCNC: 35.1 % — SIGNIFICANT CHANGE UP (ref 32–36)
MCHC RBC-ENTMCNC: 35.8 % — SIGNIFICANT CHANGE UP (ref 32–36)
MCHC RBC-ENTMCNC: 35.9 % — SIGNIFICANT CHANGE UP (ref 32–36)
MCV RBC AUTO: 79.1 FL — LOW (ref 80–100)
MCV RBC AUTO: 79.6 FL — LOW (ref 80–100)
MCV RBC AUTO: 82.1 FL — SIGNIFICANT CHANGE UP (ref 80–100)
MONOCYTES # BLD AUTO: 2.11 K/UL — HIGH (ref 0–0.9)
MONOCYTES NFR BLD AUTO: 12.4 % — SIGNIFICANT CHANGE UP (ref 2–14)
NEUTROPHILS # BLD AUTO: 10.4 K/UL — HIGH (ref 1.8–7.4)
NEUTROPHILS NFR BLD AUTO: 61 % — SIGNIFICANT CHANGE UP (ref 43–77)
NRBC # FLD: 0.38 K/UL — SIGNIFICANT CHANGE UP (ref 0–0)
NRBC # FLD: 0.39 K/UL — SIGNIFICANT CHANGE UP (ref 0–0)
NRBC # FLD: 0.41 K/UL — SIGNIFICANT CHANGE UP (ref 0–0)
NRBC FLD-RTO: 2.2 — SIGNIFICANT CHANGE UP
NRBC FLD-RTO: 2.3 — SIGNIFICANT CHANGE UP
NRBC FLD-RTO: 2.5 — SIGNIFICANT CHANGE UP
NT-PROBNP SERPL-SCNC: 606.5 PG/ML — SIGNIFICANT CHANGE UP
PCO2 BLDV: 48 MMHG — SIGNIFICANT CHANGE UP (ref 41–51)
PH BLDV: 7.37 PH — SIGNIFICANT CHANGE UP (ref 7.32–7.43)
PHOSPHATE SERPL-MCNC: 3.5 MG/DL — SIGNIFICANT CHANGE UP (ref 2.5–4.5)
PLATELET # BLD AUTO: 258 K/UL — SIGNIFICANT CHANGE UP (ref 150–400)
PLATELET # BLD AUTO: 264 K/UL — SIGNIFICANT CHANGE UP (ref 150–400)
PLATELET # BLD AUTO: 271 K/UL — SIGNIFICANT CHANGE UP (ref 150–400)
PMV BLD: 10 FL — SIGNIFICANT CHANGE UP (ref 7–13)
PMV BLD: 9.3 FL — SIGNIFICANT CHANGE UP (ref 7–13)
PMV BLD: 9.3 FL — SIGNIFICANT CHANGE UP (ref 7–13)
PO2 BLDV: 34 MMHG — LOW (ref 35–40)
POTASSIUM BLDV-SCNC: 4.3 MMOL/L — SIGNIFICANT CHANGE UP (ref 3.4–4.5)
POTASSIUM SERPL-MCNC: 4.2 MMOL/L — SIGNIFICANT CHANGE UP (ref 3.5–5.3)
POTASSIUM SERPL-SCNC: 4.2 MMOL/L — SIGNIFICANT CHANGE UP (ref 3.5–5.3)
PROT SERPL-MCNC: 7.4 G/DL — SIGNIFICANT CHANGE UP (ref 6–8.3)
PROTHROM AB SERPL-ACNC: 13.6 SEC — HIGH (ref 9.8–13.1)
RBC # BLD: 4.13 M/UL — LOW (ref 4.2–5.8)
RBC # BLD: 4.31 M/UL — SIGNIFICANT CHANGE UP (ref 4.2–5.8)
RBC # BLD: 4.45 M/UL — SIGNIFICANT CHANGE UP (ref 4.2–5.8)
RBC # FLD: 20.6 % — HIGH (ref 10.3–14.5)
RBC # FLD: 20.7 % — HIGH (ref 10.3–14.5)
RBC # FLD: 20.9 % — HIGH (ref 10.3–14.5)
RSV RNA SPEC QL NAA+PROBE: NOT DETECTED — SIGNIFICANT CHANGE UP
RV+EV RNA SPEC QL NAA+PROBE: NOT DETECTED — SIGNIFICANT CHANGE UP
SAO2 % BLDV: 54.5 % — LOW (ref 60–85)
SODIUM SERPL-SCNC: 139 MMOL/L — SIGNIFICANT CHANGE UP (ref 135–145)
TROPONIN T, HIGH SENSITIVITY: 243 NG/L — CRITICAL HIGH (ref ?–14)
TROPONIN T, HIGH SENSITIVITY: 246 NG/L — CRITICAL HIGH (ref ?–14)
URATE SERPL-MCNC: 7.9 MG/DL — SIGNIFICANT CHANGE UP (ref 3.4–8.8)
WBC # BLD: 16.34 K/UL — HIGH (ref 3.8–10.5)
WBC # BLD: 17.05 K/UL — HIGH (ref 3.8–10.5)
WBC # BLD: 17.07 K/UL — HIGH (ref 3.8–10.5)
WBC # FLD AUTO: 16.34 K/UL — HIGH (ref 3.8–10.5)
WBC # FLD AUTO: 17.05 K/UL — HIGH (ref 3.8–10.5)
WBC # FLD AUTO: 17.07 K/UL — HIGH (ref 3.8–10.5)

## 2020-02-10 PROCEDURE — 99223 1ST HOSP IP/OBS HIGH 75: CPT | Mod: GC

## 2020-02-10 PROCEDURE — 71046 X-RAY EXAM CHEST 2 VIEWS: CPT | Mod: 26

## 2020-02-10 PROCEDURE — 71275 CT ANGIOGRAPHY CHEST: CPT | Mod: 26

## 2020-02-10 RX ORDER — MORPHINE SULFATE 50 MG/1
4 CAPSULE, EXTENDED RELEASE ORAL ONCE
Refills: 0 | Status: DISCONTINUED | OUTPATIENT
Start: 2020-02-10 | End: 2020-02-10

## 2020-02-10 RX ORDER — HEPARIN SODIUM 5000 [USP'U]/ML
INJECTION INTRAVENOUS; SUBCUTANEOUS
Qty: 25000 | Refills: 0 | Status: DISCONTINUED | OUTPATIENT
Start: 2020-02-10 | End: 2020-02-10

## 2020-02-10 RX ORDER — INFLUENZA VIRUS VACCINE 15; 15; 15; 15 UG/.5ML; UG/.5ML; UG/.5ML; UG/.5ML
0.5 SUSPENSION INTRAMUSCULAR ONCE
Refills: 0 | Status: DISCONTINUED | OUTPATIENT
Start: 2020-02-10 | End: 2020-02-14

## 2020-02-10 RX ORDER — HEPARIN SODIUM 5000 [USP'U]/ML
3500 INJECTION INTRAVENOUS; SUBCUTANEOUS EVERY 6 HOURS
Refills: 0 | Status: DISCONTINUED | OUTPATIENT
Start: 2020-02-10 | End: 2020-02-10

## 2020-02-10 RX ORDER — HEPARIN SODIUM 5000 [USP'U]/ML
7000 INJECTION INTRAVENOUS; SUBCUTANEOUS EVERY 6 HOURS
Refills: 0 | Status: DISCONTINUED | OUTPATIENT
Start: 2020-02-10 | End: 2020-02-13

## 2020-02-10 RX ORDER — HEPARIN SODIUM 5000 [USP'U]/ML
5300 INJECTION INTRAVENOUS; SUBCUTANEOUS EVERY 6 HOURS
Refills: 0 | Status: DISCONTINUED | OUTPATIENT
Start: 2020-02-10 | End: 2020-02-10

## 2020-02-10 RX ORDER — HEPARIN SODIUM 5000 [USP'U]/ML
3500 INJECTION INTRAVENOUS; SUBCUTANEOUS EVERY 6 HOURS
Refills: 0 | Status: DISCONTINUED | OUTPATIENT
Start: 2020-02-10 | End: 2020-02-13

## 2020-02-10 RX ORDER — HEPARIN SODIUM 5000 [USP'U]/ML
5000 INJECTION INTRAVENOUS; SUBCUTANEOUS ONCE
Refills: 0 | Status: COMPLETED | OUTPATIENT
Start: 2020-02-10 | End: 2020-02-10

## 2020-02-10 RX ORDER — HEPARIN SODIUM 5000 [USP'U]/ML
7000 INJECTION INTRAVENOUS; SUBCUTANEOUS ONCE
Refills: 0 | Status: DISCONTINUED | OUTPATIENT
Start: 2020-02-10 | End: 2020-02-10

## 2020-02-10 RX ORDER — HEPARIN SODIUM 5000 [USP'U]/ML
7000 INJECTION INTRAVENOUS; SUBCUTANEOUS EVERY 6 HOURS
Refills: 0 | Status: DISCONTINUED | OUTPATIENT
Start: 2020-02-10 | End: 2020-02-10

## 2020-02-10 RX ORDER — ASPIRIN/CALCIUM CARB/MAGNESIUM 324 MG
324 TABLET ORAL ONCE
Refills: 0 | Status: COMPLETED | OUTPATIENT
Start: 2020-02-10 | End: 2020-02-10

## 2020-02-10 RX ORDER — HEPARIN SODIUM 5000 [USP'U]/ML
INJECTION INTRAVENOUS; SUBCUTANEOUS
Qty: 25000 | Refills: 0 | Status: DISCONTINUED | OUTPATIENT
Start: 2020-02-10 | End: 2020-02-13

## 2020-02-10 RX ADMIN — HEPARIN SODIUM 1000 UNIT(S)/HR: 5000 INJECTION INTRAVENOUS; SUBCUTANEOUS at 12:59

## 2020-02-10 RX ADMIN — HEPARIN SODIUM 1600 UNIT(S)/HR: 5000 INJECTION INTRAVENOUS; SUBCUTANEOUS at 16:00

## 2020-02-10 RX ADMIN — HEPARIN SODIUM 1600 UNIT(S)/HR: 5000 INJECTION INTRAVENOUS; SUBCUTANEOUS at 19:36

## 2020-02-10 RX ADMIN — Medication 324 MILLIGRAM(S): at 12:17

## 2020-02-10 RX ADMIN — HEPARIN SODIUM 5000 UNIT(S): 5000 INJECTION INTRAVENOUS; SUBCUTANEOUS at 12:59

## 2020-02-10 NOTE — ED ADULT NURSE NOTE - OBJECTIVE STATEMENT
Pt presents to room 4, alert and oriented x4 and ambulatory at baseline, coming to ED from home for evaluation of shortness of breath that started this morning when he was at home. Pt endorses intermittent left-sided chest pain x1day that last 15 minutes. Pt denies any dizziness, blurry vision, headaches or nausea, vomiting or diarrhea. Respirations even and non-labored, sat at 93% on RA, placed on 2L of O2 NC Pt presents to room 4, alert and oriented x4 and ambulatory at baseline, coming to ED from home for evaluation of shortness of breath that started this morning when he was at home. Pt endorses intermittent left-sided chest pain x1day that last 15 minutes radiating to the left arm. Pt denies any dizziness, blurry vision, headaches or nausea, vomiting or diarrhea. Pt is non-diaphoretic, respirations even and non-labored, speaking in full and complete sentences, no accessory muscle use observed, sat at 98% on 2L of O2 NC. Pt presented with bilateral lower extremities swelling and pitting edema pt states he noticed it a week ago, denies any foot pain. 20g IV left arm, labs drawn and sent. Repeat EKG being done at bedside. Awaiting for xray. Will continue to monitor.

## 2020-02-10 NOTE — H&P ADULT - PROBLEM SELECTOR PLAN 1
Found to have bilateral segmental and subsegmental pulmonary emboli, unprovoked. Denied immobilization, recent travel, or surgeries. Elevated D-dimer and troponinemia with evidence of right heart strain on ECG.   -cw heparin gtt for now  -malignancy workup as below

## 2020-02-10 NOTE — H&P ADULT - PROBLEM SELECTOR PLAN 3
WBC in teens with presence of smudge cells during prior admission in March. On this admission with leukocytosis to 17. CXR 2/10 with findings of Redemonstrated slightly blunted right CP angle compatible with a chronic small pleural reaction. Sharp appearing left CP angle. Hazy ill-defined increased right perihilar markings indeterminate for asymmetric edema or perihilar infection/pneumonia in the proper clinical context.   -?infectious vs. malignancy. at this point low suspicion for infectious cause. Afebrile, leukocytosis is chronic since prior admission.  -follow up RVP  -follow up LDH, uric acid

## 2020-02-10 NOTE — H&P ADULT - PROBLEM SELECTOR PLAN 8
Transitions of Care Status:  1.  Name of PCP: none   2.  PCP Contacted on Admission: [ ] Y    [ ] N  [ X] N/A  3.  PCP contacted at Discharge: [ ] Y    [ ] N    [ ] N/A  4.  Post-Discharge Appointment Date and Location:  5.  Summary of Handoff given to PCP:

## 2020-02-10 NOTE — ED PROVIDER NOTE - CLINICAL SUMMARY MEDICAL DECISION MAKING FREE TEXT BOX
48 yo M, nonsmoker with no significant PMH who presents to the ER c/o left side chest pain since yesterday and sob this morning. well appearing male p/w acute left side chest pain yesterday a/w sob today, tachycardia, hypoxic, 50 yo M, nonsmoker with no significant PMH who presents to the ER c/o left side chest pain since yesterday and sob this morning. well appearing male p/w acute left side chest pain yesterday a/w sob today, tachycardia, hypoxic, deep inverted T waves changes, concern for ACS, possible PE, less likely CHF, The patient's risk factors for ACS were reviewed as well as the EKG.  The CXR assists in r/o Pneumonia, Pneumothorax, Esophageal Tears.  There is no fever.  There does not appear to be an Aortic Dissection either based on history, physical exam, and signs. Plan: labs, Trop, d-dimer, pro-bnp, CXR, and reassess.

## 2020-02-10 NOTE — H&P ADULT - HISTORY OF PRESENT ILLNESS
49 year old male with no significant past medical history presented to ED for chest pain and shortness of breath which began the night prior to admission. He states he was watching the [employer] and suddenly felt pressure in his chest associated with shortness of breath. Denied fever, chills, nausea, vomiting. History of recent travel, clotting disorders, or malignancy. However of note on patient's prior admission in March 2019 for sepsis 2/2 to rhinovirus. Pt was noted to have leukocytosis which did not resolve after resolution of viral illness, also noted to have smudge cells on peripheral smear at that time. He was urged to follow up with a PCP after discharge but was lost to follow up. States he had colonoscopy at age 42 which he reports was fine. No night sweats, fatigue, or recent weight loss. Works at [employer] and is active on his feet daily. No recent immobilization or surgeries.  In ED pt was afebrile, tachycardic to 106, /79, RR 24, satting 90% on RA. EKG with TWI and 49 year old male with no significant past medical history presented to ED for chest pain and shortness of breath which began the night prior to admission. He states he was watching the [employer] and suddenly felt pressure in his chest associated with shortness of breath. Denied fever, chills, nausea, vomiting. History of recent travel, clotting disorders, or malignancy. However of note on patient's prior admission in March 2019 for sepsis 2/2 to rhinovirus. Pt was noted to have leukocytosis which did not resolve after resolution of viral illness, also noted to have smudge cells on peripheral smear at that time. He was urged to follow up with a PCP after discharge but was lost to follow up. States he had colonoscopy at age 42 which he reports was fine. No night sweats, fatigue, or recent weight loss. Works at [employer] and is active on his feet daily. No recent immobilization or surgeries. CT A/P on prior admission 3/2019 was unremarkable.   In ED pt was afebrile, tachycardic to 106, /79, RR 24, satting 90% on RA. EKG with TWI in V2-V3. Troponins elevated at 243 >> 246. Started on heparin gtt. 49 year old male with no significant past medical history presented to ED for chest pain and shortness of breath which began the night prior to admission. He states he was watching the [employer] and suddenly felt pressure in his chest associated with shortness of breath. Denied fever, chills, nausea, vomiting. History of recent travel, clotting disorders, or malignancy. However of note on patient's prior admission in March 2019 for sepsis 2/2 to rhinovirus. Pt was noted to have leukocytosis which did not resolve after resolution of viral illness, also noted to have smudge cells on peripheral smear at that time. He was urged to follow up with a PCP after discharge but was lost to follow up. States he had colonoscopy at age 42 which he reports was fine. No night sweats, fatigue, or recent weight loss. No changes in bowel habits or urinary symptoms. Does not recall any family history of cancer. Works at [employer] and is active on his feet daily. No recent immobilization or surgeries. CT A/P on prior admission 3/2019 was unremarkable.   In ED pt was afebrile, tachycardic to 106, /79, RR 24, satting 90% on RA. EKG with TWI in V2-V3. Troponins elevated at 243 >> 246. Started on heparin gtt.

## 2020-02-10 NOTE — ED ADULT NURSE REASSESSMENT NOTE - NS ED NURSE REASSESS COMMENT FT1
Pt received alert and oriented. Pt sleeping in stretcher. Heparin gtt infusing. No complaints at this time. Will continue to monitor.

## 2020-02-10 NOTE — H&P ADULT - ATTENDING COMMENTS
Unprovoked PE, start therapeutic anticoagulation.   Repeat EKG, trend Troponins. Echocardiogram.   Monitor for any signs symptoms of respiratory failure.   If worsening hypoxia- start HFNC, avoid BIPAP in the setting of PE.   Will consider catheter guided thrombolysis if worsening hypoxia  Given b/l hilar adenopathy, Pulm eval in AM- ?Biopsy possible Sarcoidosis.   No signs symptoms suggestive of underlying infection.   Consider malignancy work up. Obtain peripheral smear- prior ? Smudge cells.

## 2020-02-10 NOTE — H&P ADULT - PROBLEM SELECTOR PLAN 5
Transitions of Care Status:  1.  Name of PCP: none   2.  PCP Contacted on Admission: [ ] Y    [ ] N  [ X] N/A  3.  PCP contacted at Discharge: [ ] Y    [ ] N    [ ] N/A  4.  Post-Discharge Appointment Date and Location:  5.  Summary of Handoff given to PCP: Hgb of 12.6, baseline ~14. No reported bleeding events. will monitor now that on heparin gtt.  -follow up iron studies Hgb of 12.6, baseline ~14. Microcytic. No reported bleeding events. will monitor now that on heparin gtt.  -follow up iron studies

## 2020-02-10 NOTE — ED ADULT TRIAGE NOTE - CHIEF COMPLAINT QUOTE
Patient c/o shortness of breath since this morning with intermittent left sided chest pain. also c/o cough x 5 days. Denies fever. Denies past medical history. Spo2 89-90% on Room air. Placed on 2L nasal cannula. spo increased to 97%. No wheezing/crackles/ronchi auscultated at this time.

## 2020-02-10 NOTE — ED PROVIDER NOTE - OBJECTIVE STATEMENT
48 yo M, nonsmoker with no significant PMH who presents to the ER c/o left side chest pain since yesterday and sob this morning. Pt states character of pain is pressure, intermittent,  7/10 in severity, radiating to left arm, last for minutes, no aggravating or relieving factors. Reports pain is not reproducible, not worsened with position, movement, deep breathing or palpation. States woke up this morning feeling sob, difficult to breathe. Admits dry cough x3 days, no fever. Denies similar chest pain in the past. Pt denies any associated symptoms, including f/c/n/v, near syncope, diaphoresis, weakness, any recent trauma or injury to chest.   Denies any prior history of DVT/PE, hx of malignancy or known hypercoagulable state.   Denies any early family history of cardiac death or MI.

## 2020-02-10 NOTE — H&P ADULT - PROBLEM SELECTOR PLAN 6
SCr at 1.23, in 3/2019 was 1.03. Likely in setting of   -continue to monitor SCr at 1.23, in 3/2019 was 1.03. Likely in setting of poor forward flow 2/2 to PE.   -continue to monitor

## 2020-02-10 NOTE — H&P ADULT - NSHPPHYSICALEXAM_GEN_ALL_CORE
PHYSICAL EXAM:  GENERAL: middle aged male lying in bed comfortably  ENT: Moist mucous membranes  NECK: Supple, No JVD  CHEST/LUNG: Clear to auscultation bilaterally, on nasal cannula   HEART: Regular rate and rhythm; No murmurs, rubs, or gallops  ABDOMEN: obese, bowel sounds present; Soft, Nontender, non-distended   EXTREMITIES: trace pitting edema to bilateral shins, no asymmetry  NERVOUS SYSTEM:  Alert & Oriented X3, speech clear. No deficits   MSK: FROM all 4 extremities, full and equal strength  SKIN: No rashes or lesions PHYSICAL EXAM:  GENERAL: middle aged male lying in bed comfortably  ENT: Moist mucous membranes  NECK: Supple, No JVD  CHEST/LUNG: Clear to auscultation bilaterally, on 2L nasal cannula   HEART: Regular rate and rhythm; No murmurs, rubs, or gallops  ABDOMEN: obese, bowel sounds present; Soft, Nontender, non-distended   EXTREMITIES: trace pitting edema to bilateral shins, no asymmetry  NERVOUS SYSTEM:  Alert & Oriented X3, speech clear. No deficits   MSK: FROM all 4 extremities, full and equal strength  SKIN: No rashes or lesions

## 2020-02-10 NOTE — H&P ADULT - NSHPREVIEWOFSYSTEMS_GEN_ALL_CORE
REVIEW OF SYSTEMS:    CONSTITUTIONAL: No weakness, fevers or chills  EYES/ENT: No visual changes;  No vertigo or throat pain   NECK: No pain or stiffness  RESPIRATORY: No cough, wheezing, or hemoptysis; +shortness of breath  CARDIOVASCULAR: +chest pain   GASTROINTESTINAL: No abdominal or epigastric pain. No nausea, vomiting, or hematemesis; No diarrhea or constipation. No melena or hematochezia.  GENITOURINARY: No dysuria, frequency or hematuria  NEUROLOGICAL: No numbness or weakness  SKIN: No itching, rashes

## 2020-02-10 NOTE — ED ADULT NURSE REASSESSMENT NOTE - NS ED NURSE REASSESS COMMENT FT1
Pt appears to be resting comfortably, pt denies any symptoms at this time. Respirations even and non-labored, sat at 98% on 2L of O2 NC. Awaiting bed assignment. Will continue to monitor. Pt appears to be resting comfortably, pt denies any symptoms at this time. Pt currently receiving heparin infusion 16ml/hr, full anticoag nomogram as ordered by LUCAS Almaraz.  Respirations even and non-labored, sat at 98% on 2L of O2 NC. Awaiting bed assignment. Will continue to monitor.

## 2020-02-10 NOTE — ED PROVIDER NOTE - ATTENDING CONTRIBUTION TO CARE
I performed a face to face bedside interview with patient regarding history of present illness, review of symptoms and past medical history. I completed an independent physical exam.  I have discussed patient's plan of care.   I agree with note as stated above, having amended the EMR as needed to reflect my findings. I have discussed the assessment and plan of care.  This includes during the time I functioned as the attending physician for this patient.  Attending Contribution to Care: agree with plan of pa. 50 yo M, nonsmoker with no significant PMH who presents to the ER c/o left side chest pain since yesterday and sob this morning. Pt states character of pain is pressure, intermittent,  7/10 in severity, radiating to left arm, last for minutes, no aggravating or relieving factors. Reports pain is not reproducible, not worsened with position, movement, deep breathing or palpation. States woke up this morning feeling sob, difficult to breathe. Admits dry cough x3 days, no fever. Denies similar chest pain in the past. Pt denies any associated symptoms, including f/c/n/v, near syncope, diaphoresis, weakness, any recent trauma or injury to chest.   Denies any prior history of DVT/PE, hx of malignancy or known hypercoagulable state.   Denies any early family history of cardiac death or MI.

## 2020-02-10 NOTE — H&P ADULT - ASSESSMENT
49M with no prior medical history presented for chest pain and shortness of breath, found to have bilateral PE. 49M with no prior medical history presented for chest pain and shortness of breath, found to have bilateral PE, hilar LAD. Also with history of leukocytosis with smudge cells known from prior admission 3/2019.

## 2020-02-10 NOTE — H&P ADULT - PROBLEM SELECTOR PLAN 4
DVT: heparin gtt   Diet: regular   Dispo: pending Noted to have enlarged heart on CXR and CTA 2/10. No known history of CHF. Exam findings of bilateral pitting edema. Pro-BNP elevated to 600s.   -follow up TTE Noted to have enlarged heart on CXR and CTA 2/10. No known history of CHF. Exam findings of bilateral pitting edema. Pro-BNP elevated to 600s.   -follow up TTE  -follow up serum ACE

## 2020-02-10 NOTE — H&P ADULT - PROBLEM SELECTOR PLAN 2
Noted to have hilar LAD on CTA, new from prior CTA from 3/2019. Denies hemoptysis, weight loss, fever, chills, or night sweats. Is from [place] but emigrated 20 years ago and no recent travel. Low concern for infectious etiology. In setting of unprovoked PE and leukocytosis with smudge cells, suspect underlying malignancy.   -pulm consult in AM for possible bx

## 2020-02-10 NOTE — ED PROVIDER NOTE - PROGRESS NOTE DETAILS
PA LOBITO: Repeat EKG, no significant changes, still Deep inverted T wave. Patient reassessed, lying comfortably in chair in NAD,  States feeling better, symptoms improved, pain 4/10. Trop 243, elevated D-dimer, Cr wnl. Discussed with attending, will start Heparin, CTA to r/o PE, heart strain. Will continue to monitor and reassess. PA LOBITO: Repeat EKG, no significant changes, still Deep inverted T wave. Patient reassessed, lying comfortably in chair in NAD,  States feeling better, symptoms improved, pain 4/10. Trop 243, elevated D-dimer, Cr wnl. Discussed with attending, will start Heparin (ACS), CTA to r/o PE, heart strain. Will continue to monitor and reassess. PA LOBITO: CTA chest shows PE, full anticoagulation ordered. Patient reassessed, sleeping comfortably in bed in NAD, denies any complaints. States feeling better, symptoms improved. Will continue to monitor and reassess. LUCAS ROBIN: Spoke with hospitalist, Dr. Saldivar, accepted pt. Pt admitted for PE on tele. Tele PA text paged. Given b/l opacity on CT, pt has dry cough, improved, no fever, discussed with attending, no abx indicated at this point. LUCAS ROBIN: Spoke with hospitalist, Dr. Saldivar, accepted pt.

## 2020-02-11 ENCOUNTER — TRANSCRIPTION ENCOUNTER (OUTPATIENT)
Age: 50
End: 2020-02-11

## 2020-02-11 LAB
ANION GAP SERPL CALC-SCNC: 13 MMO/L — SIGNIFICANT CHANGE UP (ref 7–14)
APTT BLD: 59.9 SEC — HIGH (ref 27.5–36.3)
BASOPHILS # BLD AUTO: 0.12 K/UL — SIGNIFICANT CHANGE UP (ref 0–0.2)
BASOPHILS NFR BLD AUTO: 0.8 % — SIGNIFICANT CHANGE UP (ref 0–2)
BUN SERPL-MCNC: 10 MG/DL — SIGNIFICANT CHANGE UP (ref 7–23)
CALCIUM SERPL-MCNC: 8.7 MG/DL — SIGNIFICANT CHANGE UP (ref 8.4–10.5)
CHLORIDE SERPL-SCNC: 106 MMOL/L — SIGNIFICANT CHANGE UP (ref 98–107)
CO2 SERPL-SCNC: 23 MMOL/L — SIGNIFICANT CHANGE UP (ref 22–31)
CREAT SERPL-MCNC: 1.09 MG/DL — SIGNIFICANT CHANGE UP (ref 0.5–1.3)
EOSINOPHIL # BLD AUTO: 0.7 K/UL — HIGH (ref 0–0.5)
EOSINOPHIL NFR BLD AUTO: 4.5 % — SIGNIFICANT CHANGE UP (ref 0–6)
FERRITIN SERPL-MCNC: 71.36 NG/ML — SIGNIFICANT CHANGE UP (ref 30–400)
GLUCOSE SERPL-MCNC: 91 MG/DL — SIGNIFICANT CHANGE UP (ref 70–99)
HCT VFR BLD CALC: 35.1 % — LOW (ref 39–50)
HCT VFR BLD CALC: 35.9 % — LOW (ref 39–50)
HCT VFR BLD CALC: 35.9 % — LOW (ref 39–50)
HGB BLD-MCNC: 12.5 G/DL — LOW (ref 13–17)
HGB BLD-MCNC: 12.6 G/DL — LOW (ref 13–17)
HGB BLD-MCNC: 12.6 G/DL — LOW (ref 13–17)
IMM GRANULOCYTES NFR BLD AUTO: 0.6 % — SIGNIFICANT CHANGE UP (ref 0–1.5)
IRON SATN MFR SERPL: 320 UG/DL — SIGNIFICANT CHANGE UP (ref 155–535)
IRON SATN MFR SERPL: 56 UG/DL — SIGNIFICANT CHANGE UP (ref 45–165)
LYMPHOCYTES # BLD AUTO: 21.9 % — SIGNIFICANT CHANGE UP (ref 13–44)
LYMPHOCYTES # BLD AUTO: 3.4 K/UL — HIGH (ref 1–3.3)
MAGNESIUM SERPL-MCNC: 2 MG/DL — SIGNIFICANT CHANGE UP (ref 1.6–2.6)
MCHC RBC-ENTMCNC: 28.8 PG — SIGNIFICANT CHANGE UP (ref 27–34)
MCHC RBC-ENTMCNC: 28.8 PG — SIGNIFICANT CHANGE UP (ref 27–34)
MCHC RBC-ENTMCNC: 29.1 PG — SIGNIFICANT CHANGE UP (ref 27–34)
MCHC RBC-ENTMCNC: 35.1 % — SIGNIFICANT CHANGE UP (ref 32–36)
MCHC RBC-ENTMCNC: 35.1 % — SIGNIFICANT CHANGE UP (ref 32–36)
MCHC RBC-ENTMCNC: 35.6 % — SIGNIFICANT CHANGE UP (ref 32–36)
MCV RBC AUTO: 81.6 FL — SIGNIFICANT CHANGE UP (ref 80–100)
MCV RBC AUTO: 82 FL — SIGNIFICANT CHANGE UP (ref 80–100)
MCV RBC AUTO: 82 FL — SIGNIFICANT CHANGE UP (ref 80–100)
MONOCYTES # BLD AUTO: 1.51 K/UL — HIGH (ref 0–0.9)
MONOCYTES NFR BLD AUTO: 9.7 % — SIGNIFICANT CHANGE UP (ref 2–14)
NEUTROPHILS # BLD AUTO: 9.73 K/UL — HIGH (ref 1.8–7.4)
NEUTROPHILS NFR BLD AUTO: 62.5 % — SIGNIFICANT CHANGE UP (ref 43–77)
NRBC # FLD: 0.42 K/UL — SIGNIFICANT CHANGE UP (ref 0–0)
NRBC # FLD: 0.48 K/UL — SIGNIFICANT CHANGE UP (ref 0–0)
NRBC # FLD: 0.48 K/UL — SIGNIFICANT CHANGE UP (ref 0–0)
NRBC FLD-RTO: 2.8 — SIGNIFICANT CHANGE UP
NRBC FLD-RTO: 3.1 — SIGNIFICANT CHANGE UP
NRBC FLD-RTO: 3.1 — SIGNIFICANT CHANGE UP
PHOSPHATE SERPL-MCNC: 3.7 MG/DL — SIGNIFICANT CHANGE UP (ref 2.5–4.5)
PLATELET # BLD AUTO: 279 K/UL — SIGNIFICANT CHANGE UP (ref 150–400)
PLATELET # BLD AUTO: 289 K/UL — SIGNIFICANT CHANGE UP (ref 150–400)
PLATELET # BLD AUTO: 289 K/UL — SIGNIFICANT CHANGE UP (ref 150–400)
PMV BLD: 10.1 FL — SIGNIFICANT CHANGE UP (ref 7–13)
PMV BLD: 10.1 FL — SIGNIFICANT CHANGE UP (ref 7–13)
PMV BLD: 10.3 FL — SIGNIFICANT CHANGE UP (ref 7–13)
POTASSIUM SERPL-MCNC: 4.2 MMOL/L — SIGNIFICANT CHANGE UP (ref 3.5–5.3)
POTASSIUM SERPL-SCNC: 4.2 MMOL/L — SIGNIFICANT CHANGE UP (ref 3.5–5.3)
RBC # BLD: 4.3 M/UL — SIGNIFICANT CHANGE UP (ref 4.2–5.8)
RBC # BLD: 4.38 M/UL — SIGNIFICANT CHANGE UP (ref 4.2–5.8)
RBC # BLD: 4.38 M/UL — SIGNIFICANT CHANGE UP (ref 4.2–5.8)
RBC # FLD: 20.2 % — HIGH (ref 10.3–14.5)
RBC # FLD: 20.2 % — HIGH (ref 10.3–14.5)
RBC # FLD: 20.6 % — HIGH (ref 10.3–14.5)
SODIUM SERPL-SCNC: 142 MMOL/L — SIGNIFICANT CHANGE UP (ref 135–145)
TROPONIN T, HIGH SENSITIVITY: 196 NG/L — CRITICAL HIGH (ref ?–14)
UIBC SERPL-MCNC: 263.5 UG/DL — SIGNIFICANT CHANGE UP (ref 110–370)
WBC # BLD: 14.98 K/UL — HIGH (ref 3.8–10.5)
WBC # BLD: 15.55 K/UL — HIGH (ref 3.8–10.5)
WBC # BLD: 15.55 K/UL — HIGH (ref 3.8–10.5)
WBC # FLD AUTO: 14.98 K/UL — HIGH (ref 3.8–10.5)
WBC # FLD AUTO: 15.55 K/UL — HIGH (ref 3.8–10.5)
WBC # FLD AUTO: 15.55 K/UL — HIGH (ref 3.8–10.5)

## 2020-02-11 PROCEDURE — 99233 SBSQ HOSP IP/OBS HIGH 50: CPT | Mod: GC

## 2020-02-11 PROCEDURE — 99255 IP/OBS CONSLTJ NEW/EST HI 80: CPT | Mod: GC

## 2020-02-11 RX ADMIN — HEPARIN SODIUM 1600 UNIT(S)/HR: 5000 INJECTION INTRAVENOUS; SUBCUTANEOUS at 03:22

## 2020-02-11 NOTE — CONSULT NOTE ADULT - ATTENDING COMMENTS
Agree with above.  Patient seen and examined. Chart reviewed.    49 year old man presents with shortness of breath found to have unprovoked PE, CT scan with GGO in the lung parenchyma around the areas of the clots ? possible hemorrhage secondary to PE    - hemodynamically stable  - shortness of breath improved, saturation in the mid 90's on 2 lpm nasal cannula  - review of CT from 03/2019 showed enlarged pulmonary artery, no PE and lungs were clear  - continue AC  - needs Echocardiogram and DVT study  - will need follow up CT scan in 4-6 weeks if persistent infiltrate in lung parenchyma may need biopsy    pulmonary will follow

## 2020-02-11 NOTE — DISCHARGE NOTE PROVIDER - PROVIDER TOKENS
PROVIDER:[TOKEN:[[id number]]],PROVIDER:[TOKEN:[[id number]]] PROVIDER:[TOKEN:[[id number]]],PROVIDER:[TOKEN:[[id number]]],PROVIDER:[TOKEN:[[id number]]] PROVIDER:[TOKEN:[[id number]]],PROVIDER:[TOKEN:[[id number]]]

## 2020-02-11 NOTE — PROGRESS NOTE ADULT - SUBJECTIVE AND OBJECTIVE BOX
PROGRESS NOTE:   Authored by Nikki Kimura, MD, Pager [phone] [facility], [id number] [facility]     Patient is a 49y old  Male who presents with a chief complaint of chest pain, SOB (10 Feb 2020 17:23)      SUBJECTIVE / OVERNIGHT EVENTS:    ADDITIONAL REVIEW OF SYSTEMS:    MEDICATIONS  (STANDING):  heparin  Infusion.  Unit(s)/Hr (16 mL/Hr) IV Continuous <Continuous>  influenza   Vaccine 0.5 milliLiter(s) IntraMuscular once    MEDICATIONS  (PRN):  heparin  Injectable 7000 Unit(s) IV Push every 6 hours PRN For aPTT less than 40  heparin  Injectable 3500 Unit(s) IV Push every 6 hours PRN For aPTT between 40 - 57      CAPILLARY BLOOD GLUCOSE        I&O's Summary      PHYSICAL EXAM:  Vital Signs Last 24 Hrs  T(C): 37.2 (11 Feb 2020 02:10), Max: 37.2 (11 Feb 2020 02:10)  T(F): 98.9 (11 Feb 2020 02:10), Max: 98.9 (11 Feb 2020 02:10)  HR: 100 (11 Feb 2020 02:10) (99 - 106)  BP: 120/77 (11 Feb 2020 02:10) (120/77 - 141/81)  BP(mean): --  RR: 18 (11 Feb 2020 02:10) (18 - 24)  SpO2: 100% (11 Feb 2020 02:10) (90% - 100%)    GENERAL: middle aged male lying in bed comfortably  ENT: Moist mucous membranes  NECK: Supple, No JVD  CHEST/LUNG: Clear to auscultation bilaterally, on 2L nasal cannula   HEART: Regular rate and rhythm; No murmurs, rubs, or gallops  ABDOMEN: obese, bowel sounds present; Soft, Nontender, non-distended   EXTREMITIES: trace pitting edema to bilateral shins, no asymmetry  NERVOUS SYSTEM:  Alert & Oriented X3, speech clear. No deficits   MSK: FROM all 4 extremities, full and equal strength  SKIN: No rashes or lesions    LABS:                        12.5   14.98 )-----------( 279      ( 11 Feb 2020 02:04 )             35.1     02-10    139  |  105  |  7   ----------------------------<  88  4.2   |  24  |  1.23    Ca    9.2      10 Feb 2020 11:35  Phos  3.5     02-10  Mg     2.0     02-10    TPro  7.4  /  Alb  4.2  /  TBili  1.6<H>  /  DBili  x   /  AST  35  /  ALT  20  /  AlkPhos  69  02-10    PT/INR - ( 10 Feb 2020 11:35 )   PT: 13.6 SEC;   INR: 1.22          PTT - ( 11 Feb 2020 02:04 )  PTT:59.9 SEC            RADIOLOGY & ADDITIONAL TESTS:  Results Reviewed:   Imaging Personally Reviewed:  Electrocardiogram Personally Reviewed:    COORDINATION OF CARE:  Care Discussed with Consultants/Other Providers [Y/N]:  Prior or Outpatient Records Reviewed [Y/N]: PROGRESS NOTE:   Authored by Nikki Kimura, MD, Pager [phone] [facility], [id number] [facility]     Patient is a 49y old  Male who presents with a chief complaint of chest pain, SOB (10 Feb 2020 17:23)      SUBJECTIVE / OVERNIGHT EVENTS: No acute events overnight. Pt seen and examined at bedside. He is eating breakfast and states he feels well. No bleeding events. Denies chest pain or shortness of breath. No fever, chills, nausea, vomiting, or pain.      ADDITIONAL REVIEW OF SYSTEMS:    MEDICATIONS  (STANDING):  heparin  Infusion.  Unit(s)/Hr (16 mL/Hr) IV Continuous <Continuous>  influenza   Vaccine 0.5 milliLiter(s) IntraMuscular once    MEDICATIONS  (PRN):  heparin  Injectable 7000 Unit(s) IV Push every 6 hours PRN For aPTT less than 40  heparin  Injectable 3500 Unit(s) IV Push every 6 hours PRN For aPTT between 40 - 57      CAPILLARY BLOOD GLUCOSE        I&O's Summary      PHYSICAL EXAM:  Vital Signs Last 24 Hrs  T(C): 37.2 (11 Feb 2020 02:10), Max: 37.2 (11 Feb 2020 02:10)  T(F): 98.9 (11 Feb 2020 02:10), Max: 98.9 (11 Feb 2020 02:10)  HR: 100 (11 Feb 2020 02:10) (99 - 106)  BP: 120/77 (11 Feb 2020 02:10) (120/77 - 141/81)  BP(mean): --  RR: 18 (11 Feb 2020 02:10) (18 - 24)  SpO2: 100% (11 Feb 2020 02:10) (90% - 100%)    GENERAL: middle aged male sitting in bed comfortably  ENT: Moist mucous membranes  NECK: Supple, No JVD  CHEST/LUNG: Clear to auscultation bilaterally, on 2L nasal cannula   HEART: Regular rate and rhythm; No murmurs, rubs, or gallops  ABDOMEN: obese, bowel sounds present; Soft, Nontender, non-distended   EXTREMITIES: trace pitting edema to bilateral shins, no asymmetry  NERVOUS SYSTEM:  Alert & Oriented X3, speech clear. No deficits   MSK: FROM all 4 extremities, full and equal strength  SKIN: No rashes or lesions    LABS:                        12.5   14.98 )-----------( 279      ( 11 Feb 2020 02:04 )             35.1     02-10    139  |  105  |  7   ----------------------------<  88  4.2   |  24  |  1.23    Ca    9.2      10 Feb 2020 11:35  Phos  3.5     02-10  Mg     2.0     02-10    TPro  7.4  /  Alb  4.2  /  TBili  1.6<H>  /  DBili  x   /  AST  35  /  ALT  20  /  AlkPhos  69  02-10    PT/INR - ( 10 Feb 2020 11:35 )   PT: 13.6 SEC;   INR: 1.22          PTT - ( 11 Feb 2020 02:04 )  PTT:59.9 SEC            RADIOLOGY & ADDITIONAL TESTS:  Results Reviewed:   Imaging Personally Reviewed:  Electrocardiogram Personally Reviewed:    COORDINATION OF CARE:  Care Discussed with Consultants/Other Providers [Y/N]:  Prior or Outpatient Records Reviewed [Y/N]:

## 2020-02-11 NOTE — PROGRESS NOTE ADULT - PROBLEM SELECTOR PLAN 3
WBC in teens with presence of smudge cells during prior admission in March. On this admission with leukocytosis to 17. CXR 2/10 with findings of Redemonstrated slightly blunted right CP angle compatible with a chronic small pleural reaction. Sharp appearing left CP angle. Hazy ill-defined increased right perihilar markings indeterminate for asymmetric edema or perihilar infection/pneumonia in the proper clinical context.   -?infectious vs. malignancy. at this point low suspicion for infectious cause. Afebrile, leukocytosis is chronic since prior admission.  -RVP negative  -LDH elevated at 400s, uric acid within normal limits.

## 2020-02-11 NOTE — DISCHARGE NOTE PROVIDER - HOSPITAL COURSE
49 year old male with no significant past medical history presented to ED for chest pain and shortness of breath which began the night prior to admission. He states he was watching the [employer] and suddenly felt pressure in his chest associated with shortness of breath. Denied fever, chills, nausea, vomiting. History of recent travel, clotting disorders, or malignancy. However of note on patient's prior admission in March 2019 for sepsis 2/2 to rhinovirus. Pt was noted to have leukocytosis which did not resolve after resolution of viral illness, also noted to have smudge cells on peripheral smear at that time. He was urged to follow up with a PCP after discharge but was lost to follow up. States he had colonoscopy at age 42 which he reports was fine. No night sweats, fatigue, or recent weight loss. No changes in bowel habits or urinary symptoms. Does not recall any family history of cancer. Works at [employer] and is active on his feet daily. No recent immobilization or surgeries. CT A/P on prior admission 3/2019 was unremarkable.     In ED pt was afebrile, tachycardic to 106, /79, RR 24, satting 90% on RA. EKG with TWI in V2-V3. Troponins elevated at 243 >> 246. CT Angio revealed bilateral segmental and subsegmental PE with hilar LAD new from prior CTA from 3/2019. Started on heparin gtt. 49 year old male with no significant past medical history presented to ED for chest pain and shortness of breath which began the night prior to admission. He states he was watching the [employer] and suddenly felt pressure in his chest associated with shortness of breath. Denied fever, chills, nausea, vomiting. History of recent travel, clotting disorders, or malignancy. However of note on patient's prior admission in March 2019 for sepsis 2/2 to rhinovirus. Pt was noted to have leukocytosis which did not resolve after resolution of viral illness, also noted to have smudge cells on peripheral smear at that time. He was urged to follow up with a PCP after discharge but was lost to follow up. States he had colonoscopy at age 42 which he reports was fine. No night sweats, fatigue, or recent weight loss. No changes in bowel habits or urinary symptoms. Does not recall any family history of cancer. Works at [employer] and is active on his feet daily. No recent immobilization or surgeries. CT A/P on prior admission 3/2019 was unremarkable.     In ED pt was afebrile, tachycardic to 106, /79, RR 24, satting 90% on RA. EKG with TWI in V2-V3. Troponins elevated at 243 >> 246. CT Angio revealed bilateral segmental and subsegmental PE with hilar LAD new from prior CTA from 3/2019. Started on heparin gtt. TTE 2/12 showing normal LV function, dilated RV and decreased RV systolic function compared to TTE 3/19, moderate pHTN. Pulmonary was consulted for possible EBUS but deferred in light of acute PE. Recommended follow up CT scan in 4-6 weeks if persistent infiltrate in lung parenchyma, may need biopsy. 49 year old male with no significant past medical history presented to ED for chest pain and shortness of breath which began the night prior to admission. He states he was watching the [employer] and suddenly felt pressure in his chest associated with shortness of breath. Denied fever, chills, nausea, vomiting. History of recent travel, clotting disorders, or malignancy. However of note on patient's prior admission in March 2019 for sepsis 2/2 to rhinovirus. Pt was noted to have leukocytosis which did not resolve after resolution of viral illness, also noted to have smudge cells on peripheral smear at that time. He was urged to follow up with a PCP after discharge but was lost to follow up. States he had colonoscopy at age 42 which he reports was fine. No night sweats, fatigue, or recent weight loss. No changes in bowel habits or urinary symptoms. Does not recall any family history of cancer. Works at [employer] and is active on his feet daily. No recent immobilization or surgeries. CT A/P on prior admission 3/2019 was unremarkable.     In ED pt was afebrile, tachycardic to 106, /79, RR 24, satting 90% on RA. EKG with TWI in V2-V3. Troponins elevated at 243 >> 246. CT Angio revealed bilateral segmental and subsegmental PE with hilar LAD new from prior CTA from 3/2019. Started on heparin gtt. TTE 2/12 showing normal LV function, dilated RV and decreased RV systolic function compared to TTE 3/19, moderate pHTN. Pulmonary was consulted for possible EBUS but deferred in light of acute PE. Recommended follow up CT scan in 4-6 weeks if persistent infiltrate in lung parenchyma, may need biopsy. Heme was consulted for persistent leukocytosis and LAD. Felt that chronically abnormal WBC and hilar LAD was suspicious for chronic lymphoma. Sent peripheral flow cytometry and planned for BM biopsy. Pt transitioned to [facility]. 49 year old male with no significant past medical history presented to ED for chest pain and shortness of breath which began the night prior to admission. He states he was watching the [employer] and suddenly felt pressure in his chest associated with shortness of breath. Denied fever, chills, nausea, vomiting. History of recent travel, clotting disorders, or malignancy. However of note on patient's prior admission in March 2019 for sepsis 2/2 to rhinovirus. Pt was noted to have leukocytosis which did not resolve after resolution of viral illness, also noted to have smudge cells on peripheral smear at that time. He was urged to follow up with a PCP after discharge but was lost to follow up. States he had colonoscopy at age 42 which he reports was fine. No night sweats, fatigue, or recent weight loss. No changes in bowel habits or urinary symptoms. Does not recall any family history of cancer. Works at [employer] and is active on his feet daily. No recent immobilization or surgeries. CT A/P on prior admission 3/2019 was unremarkable.     In ED pt was afebrile, tachycardic to 106, /79, RR 24, satting 90% on RA. EKG with TWI in V2-V3. Troponins elevated at 243 >> 246. CT Angio revealed bilateral segmental and subsegmental PE with hilar LAD new from prior CTA from 3/2019. Started on heparin gtt. Trops downtrended. TTE 2/12 showing normal LV function, dilated RV and decreased RV systolic function compared to TTE 3/19, moderate pHTN. Pulmonary was consulted for possible EBUS but deferred in light of acute PE. Recommended follow up CT scan in 4-6 weeks if persistent infiltrate in lung parenchyma, may need biopsy. Heme was consulted for persistent leukocytosis and LAD. Felt that chronically abnormal WBC and hilar LAD was suspicious for chronic lymphoma. Sent peripheral flow cytometry and planned for BM biopsy. Pt transitioned to [facility]. 49 year old male with no significant past medical history presented to ED for chest pain and shortness of breath which began the night prior to admission. He states he was watching the [employer] and suddenly felt pressure in his chest associated with shortness of breath. Denied fever, chills, nausea, vomiting. History of recent travel, clotting disorders, or malignancy. However of note on patient's prior admission in March 2019 for sepsis 2/2 to rhinovirus. Pt was noted to have leukocytosis which did not resolve after resolution of viral illness, also noted to have smudge cells on peripheral smear at that time. He was urged to follow up with a PCP after discharge but was lost to follow up. States he had colonoscopy at age 42 which he reports was fine. No night sweats, fatigue, or recent weight loss. No changes in bowel habits or urinary symptoms. Does not recall any family history of cancer. Works at [employer] and is active on his feet daily. No recent immobilization or surgeries. CT A/P on prior admission 3/2019 was unremarkable.     In ED pt was afebrile, tachycardic to 106, /79, RR 24, satting 90% on RA. EKG with TWI in V2-V3. Troponins elevated at 243 >> 246. CT Angio revealed bilateral segmental and subsegmental PE with hilar LAD new from prior CTA from 3/2019. Started on heparin gtt. Trops downtrended. TTE 2/12 showing normal LV function, dilated RV and decreased RV systolic function compared to TTE 3/19, moderate pHTN. Pulmonary was consulted for possible EBUS but deferred in light of acute PE. Recommended follow up CT scan in 4-6 weeks if persistent infiltrate in lung parenchyma, may need biopsy. Heme was consulted for persistent leukocytosis and LAD. Felt that chronically abnormal WBC and hilar LAD was suspicious for chronic lymphoma. Sent peripheral flow cytometry, performed BM biopsy 2/13. Pt transitioned to [facility] 2/13. 49 year old male with no significant past medical history presented to ED for chest pain and shortness of breath which began the night prior to admission. He states he was watching the [employer] and suddenly felt pressure in his chest associated with shortness of breath. Denied fever, chills, nausea, vomiting. History of recent travel, clotting disorders, or malignancy. However of note on patient's prior admission in March 2019 for sepsis 2/2 to rhinovirus. Pt was noted to have leukocytosis which did not resolve after resolution of viral illness, also noted to have smudge cells on peripheral smear at that time. He was urged to follow up with a PCP after discharge but was lost to follow up. States he had colonoscopy at age 42 which he reports was fine. No night sweats, fatigue, or recent weight loss. No changes in bowel habits or urinary symptoms. Does not recall any family history of cancer. Works at [employer] and is active on his feet daily. No recent immobilization or surgeries. CT A/P on prior admission 3/2019 was unremarkable.     In ED pt was afebrile, tachycardic to 106, /79, RR 24, satting 90% on RA. EKG with TWI in V2-V3. Troponins elevated at 243 >> 246. CT Angio revealed bilateral segmental and subsegmental PE with hilar LAD new from prior CTA from 3/2019. Started on heparin gtt. Trops downtrended. TTE 2/12 showing normal LV function, dilated RV and decreased RV systolic function compared to TTE 3/19, moderate pHTN. Pulmonary was consulted for possible EBUS but deferred in light of acute PE. Recommended follow up CT scan in 4-6 weeks if persistent infiltrate in lung parenchyma, may need biopsy as outpatient. Heme was consulted for persistent leukocytosis and LAD. Felt that chronically abnormal WBC and hilar LAD was suspicious for chronic lymphoma. Sent peripheral flow cytometry, performed BM biopsy 2/13. LE Dopplers showed no DVT in either lower extremity. Pt transitioned to [facility] 2/13.

## 2020-02-11 NOTE — DISCHARGE NOTE PROVIDER - CARE PROVIDERS DIRECT ADDRESSES
[email or web address]

## 2020-02-11 NOTE — PROGRESS NOTE ADULT - PROBLEM SELECTOR PLAN 6
SCr at 1.23, in 3/2019 was 1.03. Likely in setting of poor forward flow 2/2 to PE.   -continue to monitor SCr at 1.23, in 3/2019 was 1.03. Likely in setting of poor forward flow 2/2 to PE.   -continue to monitor, improving.

## 2020-02-11 NOTE — DISCHARGE NOTE PROVIDER - NSDCFUADDAPPT_GEN_ALL_CORE_FT
Please schedule appointments with a PCP and Pulmonary clinics at the numbers above, and follow up within 1 week of discharge. Please schedule appointments with a PCP, the [facility], and Pulmonary clinic within 2 weeks of discharge. Please follow up with your PCP, the [facility], and Pulmonary clinic within 2 weeks of discharge.    For PCP:  You are scheduled to be seen at [address] [place] [address] in [facility] 1st Floor at 8:40 AM for financial counseling, followed by a medical visit with Dr. Nalini Carlton. Please arrive 20 minutes in advance, and bring 3 documents with you: 1) picture ID, 2) proof of address, 3) proof of income.     Please call the Pulmonary clinic for an appointment. Please follow up with your PCP, the [facility], and Pulmonary clinic within 2 weeks of discharge.    1. For PCP, you are scheduled to be seen at [address] [place] [address] in [facility] 1st Floor at 8:40 AM for financial counseling, followed by a medical visit with Dr. Nalini Carlton. Please arrive 20 minutes in advance, and bring 3 documents with you: 1) picture ID, 2) proof of address, 3) proof of income.     2. Please make sure to arrange a visit to see a hematologist in order to be continued on your Eliquis. If you are unable to be seen at [facility], you need to find a hematologist at [facility] for follow up.     3. Please call the Pulmonary clinic for an appointment at the number above, or arrange for follow up with a lung doctor at [facility].     Return to the ED if you have chest pain, shortness of breath, or if you run out of medication and cannot arrange for refills. Please follow up with your PCP, Hematology, and Pulmonary within 2 weeks of discharge.    1. For PCP, you are scheduled to be seen at [facility] [place] [address] in [facility] 1st Floor at 8:40 AM for financial counseling, followed by a medical visit with Dr. Nalini Carlton. Please arrive 20 minutes in advance, and bring 3 documents with you: 1) picture ID, 2) proof of address, 3) proof of income.     2. Please make sure to arrange a visit to see a hematologist in order to be continued on your Eliquis. If you are unable to be seen at [facility], you need to find a hematologist at [facility] for follow up.     3. Please call the Pulmonary clinic for an appointment at the number above, or arrange for follow up with a lung doctor at [facility].     Return to the ED if you have chest pain, shortness of breath, or if you run out of medication and cannot arrange for refills. Please follow up with your PCP, Hematology, and Pulmonary within 2 weeks of discharge.    1. For PCP, you are scheduled to be seen at [facility] [place] [address] in -[facility] 1st Floor at 8:40 AM for financial counseling, followed by a medical visit with Dr. Nalini Carlton. Please arrive 20 minutes in advance, and bring 3 documents with you: 1) picture ID, 2) proof of address, 3) proof of income. Alternatively, you may make an appointment with the [facility] at [address] [place] [address].     2. Please make sure to arrange a visit to see a hematologist in order to be continued on your Eliquis. If you are unable to be seen at [facility], you need to find a hematologist at [facility] for follow up.     3. Please call the Pulmonary clinic for an appointment at the number above, or arrange for follow up with a lung doctor at [facility].     **If you need a refill of Eliquis and cannot be seen by a provider call the [facility] group at [address] [place] [address]. to speak to a provider who can prescribe this medication to you. Please follow up with your PCP, Hematology, and Pulmonary within 2 weeks of discharge.    1. For PCP, you are scheduled to be seen at [address] [place] [address] in -[facility] 1st Floor at 8:40 AM for financial counseling, followed by a medical visit with Dr. Nalini Carlton. Please arrive 20 minutes in advance, and bring 3 documents with you: 1) picture ID, 2) proof of address, 3) proof of income.   Alternatively, you may make an appointment with the [facility] at [address] [place] [address] at [phone].     2. Please make sure to schedule a visit to see a hematologist in order to be continued on your Eliquis. If you are unable to be seen at [facility], you need to find a hematologist at [facility] for follow up. Please obtain your medical records from [facility] in order to bring results with you.     3. Please call the Pulmonary clinic for an appointment at the number above, or arrange for follow up with a lung doctor at [facility].     **If you need a refill of Eliquis and cannot be seen by a provider call the [facility] group at [address] [facility] phone # [phone] to speak to a provider who can prescribe this medication to you.

## 2020-02-11 NOTE — DISCHARGE NOTE PROVIDER - NSFOLLOWUPCLINICS_GEN_ALL_ED_FT
[facility]  Hematology/Oncology  [address] [id number]  Phone: [phone]  Fax:   Follow Up Time: [facility]  Hematology/Oncology  [address] [place] [address]  Phone: [phone]  Fax:     [facility]  Primary Care  [address] [place] [place] [address]  Phone: [phone]  Fax:   Follow Up Time:

## 2020-02-11 NOTE — DISCHARGE NOTE PROVIDER - NSDCMRMEDTOKEN_GEN_ALL_CORE_FT
Eliquis Starter Pack for Treatment of DVT and PE 5 mg oral tablet: 2 tab(s) orally 2 times a day for 7 days, followed by 1 tab 2 times a day thereafter   callback # [phone] Eliquis Starter Pack for Treatment of DVT and PE 5 mg oral tablet: 2 tab(s) orally 2 times a day for 7 days, followed by 1 tab 2 times a day thereafter   callback # [phone] Eliquis Starter Pack for Treatment of DVT and PE 5 mg oral tablet: 2 tab(s) orally 2 times a day for 7 days, followed by 1 tab 2 times a day thereafter   callback # [phone] Eliquis Starter Pack for Treatment of DVT and PE 5 mg oral tablet: 2 tab(s) orally 2 times a day for 7 days, followed by 1 tab 2 times a day thereafter   callback # [phone] Eliquis Starter Pack for Treatment of DVT and PE 5 mg oral tablet: 2 tab(s) orally 2 times a day for 7 days, followed by 1 tab 2 times a day thereafter   callback # [phone] Eliquis Starter Pack for Treatment of DVT and PE 5 mg oral tablet: 2 tab(s) orally 2 times a day for 7 days, followed by 1 tab 2 times a day thereafter   callback # [phone] Eliquis Starter Pack for Treatment of DVT and PE 5 mg oral tablet: 2 tab(s) orally 2 times a day for 7 days, followed by 1 tab 2 times a day thereafter   callback # [phone] Eliquis Starter Pack for Treatment of DVT and PE 5 mg oral tablet: 2 tab(s) orally 2 times a day for 7 days, followed by 1 tab 2 times a day thereafter   callback # [phone]

## 2020-02-11 NOTE — PROGRESS NOTE ADULT - ASSESSMENT
49M with no prior medical history presented for chest pain and shortness of breath, found to have bilateral PE, hilar LAD. Also with history of leukocytosis with smudge cells known from prior admission 3/2019.

## 2020-02-11 NOTE — DISCHARGE NOTE PROVIDER - NSDCCPCAREPLAN_GEN_ALL_CORE_FT
PRINCIPAL DISCHARGE DIAGNOSIS  Diagnosis: Pulmonary emboli  Assessment and Plan of Treatment: You were found to have clots in your lungs, on both sides. You were started on anticoagulation with heparin. You should continue taking your oral anticoagulant to prevent progression of your clots. You will need to continue this medication until further instructed. Please return to the ED if you have worsening shortness of breath or chest pain. You will need to follow up with your outpatient provider for further management.      SECONDARY DISCHARGE DIAGNOSES  Diagnosis: Hilar lymphadenopathy  Assessment and Plan of Treatment: You were found to have enlarged lymph nodes on CT scan concerning for infection vs. malignancy. The pulmonary team was consulted and recommended ___. PRINCIPAL DISCHARGE DIAGNOSIS  Diagnosis: Pulmonary emboli  Assessment and Plan of Treatment: You were found to have clots in your lungs, on both sides. You were started on anticoagulation with heparin. You should continue taking your oral anticoagulant to prevent progression of your clots. You will need to continue this medication until further instructed. Please return to the ED if you have worsening shortness of breath or chest pain. You will need to follow up with your outpatient provider for further management.         SECONDARY DISCHARGE DIAGNOSES  Diagnosis: Hilar lymphadenopathy  Assessment and Plan of Treatment: You were found to have enlarged lymph nodes on CT scan concerning for infection vs. malignancy. The pulmonary team was consulted and recommended a follow up CT scan in 4-6 weeks to evaluate need for biopsy. You should follow up in Pulmonary clinic for further care. PRINCIPAL DISCHARGE DIAGNOSIS  Diagnosis: Pulmonary emboli  Assessment and Plan of Treatment: You were found to have clots in your lungs, on both sides. You were started on anticoagulation with heparin. You should continue taking your oral anticoagulant to prevent progression of your clots. You will need to continue this medication until further instructed. Please return to the ED if you have worsening shortness of breath or chest pain.      SECONDARY DISCHARGE DIAGNOSES  Diagnosis: Leukocytosis  Assessment and Plan of Treatment: You were found to have high white blood cell count which was also high on your prior admission back in March 2019, which was concerning for infection vs. malignancy. You were tested for the flu which was negative, and your imaging did not reveal any obvious infection. The hematology team was consulted and recommended obtaining further blood tests as well as a bone marrow biopsy. You will need to follow up with the [facility] to discuss having a bone marrow biopsy and further testing.    Diagnosis: Hilar lymphadenopathy  Assessment and Plan of Treatment: You were found to have enlarged lymph nodes on CT scan concerning for infection vs. malignancy. The pulmonary team was consulted and recommended a follow up CT scan in 4-6 weeks to evaluate need for biopsy. You should follow up in Pulmonary clinic for further care. PRINCIPAL DISCHARGE DIAGNOSIS  Diagnosis: Pulmonary emboli  Assessment and Plan of Treatment: You were found to have clots in your lungs, on both sides. You were started on anticoagulation with heparin. You should continue taking your oral anticoagulant to prevent progression of your clots. You will need to continue this medication until further instructed. Please return to the ED if you have worsening shortness of breath or chest pain.      SECONDARY DISCHARGE DIAGNOSES  Diagnosis: Leukocytosis  Assessment and Plan of Treatment: You were found to have high white blood cell count which was also high on your prior admission back in March 2019, which was concerning for infection vs. malignancy. You were tested for the flu which was negative, and your imaging did not reveal any obvious infection. The hematology team was consulted and recommended obtaining further blood tests as well as a bone marrow biopsy. You will need to follow up with the [facility] to discuss results of the bone marrow biopsy and blood work.    Diagnosis: Hilar lymphadenopathy  Assessment and Plan of Treatment: You were found to have enlarged lymph nodes on CT scan concerning for infection vs. malignancy. The pulmonary team was consulted and recommended a follow up CT scan in 4-6 weeks to evaluate need for biopsy. You should follow up in Pulmonary clinic for further care. PRINCIPAL DISCHARGE DIAGNOSIS  Diagnosis: Pulmonary emboli  Assessment and Plan of Treatment: You were found to have clots in your lungs, on both sides. You were started on anticoagulation with heparin. You should continue taking your oral anticoagulant (Eliquis) to prevent progression of your clots. You will need to continue this medication until further instructed. You will receive a 1-month free starter pack from "[facility] in [facility]. Please follow up with a hematologist either at [facility] or at [facility], as you will need to refill this medication. Please return to the ED if you have worsening shortness of breath or chest pain.      SECONDARY DISCHARGE DIAGNOSES  Diagnosis: Leukocytosis  Assessment and Plan of Treatment: You were found to have high white blood cell count which was also high on your prior admission back in March 2019, which was concerning for infection vs. malignancy. You were tested for the flu which was negative, and your imaging did not reveal any obvious infection. The hematology team was consulted and recommended obtaining further blood tests as well as a bone marrow biopsy. You will need to follow up with a hematologist at [facility] or [facility] to discuss results of the bone marrow biopsy and blood work.    Diagnosis: Hilar lymphadenopathy  Assessment and Plan of Treatment: You were found to have enlarged lymph nodes on CT scan concerning for infection vs. malignancy. The pulmonary team was consulted and recommended a follow up CT scan in 4-6 weeks to evaluate need for biopsy. You should follow up in Pulmonary clinic for further care. PRINCIPAL DISCHARGE DIAGNOSIS  Diagnosis: Pulmonary emboli  Assessment and Plan of Treatment: You were found to have clots in your lungs, on both sides. You were started on anticoagulation with IV heparin. You should continue taking your oral anticoagulant (Eliquis) to prevent progression of your clots. You will need to continue Eliquis until further instructed. You will receive a 1-month free starter pack from [facility] in [facility]. Take 10 mg twice a day until 2/19. Then from 2/20 and after, take 5 mg twice a day.   Please follow up with a hematologist either at [facility] or at [facility], as you will need to refill this medication. Please return to the ED if you have worsening shortness of breath or chest pain. If you run out of medication and are unable to make appointments with a hematologist, call the [phone], the [address] [place] [address] Medicine office.      SECONDARY DISCHARGE DIAGNOSES  Diagnosis: Leukocytosis  Assessment and Plan of Treatment: You were found to have high white blood cell count which was also high on your prior admission back in March 2019, which was concerning for infection vs. malignancy. You were tested for the flu which was negative, and your imaging did not reveal any obvious infection. The hematology team was consulted and performed a bone marrow biopsy. You will need to follow up with a hematologist at [facility] or [facility] to discuss results of the bone marrow biopsy and blood work.    Diagnosis: Hilar lymphadenopathy  Assessment and Plan of Treatment: You were found to have enlarged lymph nodes on CT scan concerning for infection vs. malignancy. The pulmonary team was consulted and recommended a follow up CT scan in 4-6 weeks to evaluate need for biopsy. You should follow up in Pulmonary clinic for further care.

## 2020-02-11 NOTE — PROGRESS NOTE ADULT - PROBLEM SELECTOR PLAN 5
Hgb of 12.6, baseline ~14. Microcytic. No reported bleeding events. will monitor now that on heparin gtt.  -follow up iron studies Hgb of 12.6, baseline ~14. Microcytic. No reported bleeding events. will monitor now that on heparin gtt. iron studies normal.

## 2020-02-11 NOTE — PROGRESS NOTE ADULT - PROBLEM SELECTOR PLAN 2
Noted to have hilar LAD on CTA, new from prior CTA from 3/2019. Denies hemoptysis, weight loss, fever, chills, or night sweats. Is from [place] but emigrated 20 years ago and no recent travel. Low concern for infectious etiology. In setting of unprovoked PE and leukocytosis with smudge cells, suspect underlying malignancy.   -pulm consult in AM for possible bx Noted to have hilar LAD on CTA, new from prior CTA from 3/2019. Denies hemoptysis, weight loss, fever, chills, or night sweats. Is from [place] but emigrated 20 years ago and no recent travel. Low concern for infectious etiology. In setting of unprovoked PE and leukocytosis with smudge cells, suspect underlying malignancy.   -follow up pulm consult for possible bx

## 2020-02-11 NOTE — DISCHARGE NOTE PROVIDER - CARE PROVIDER_API CALL
Magdaleno Mathews)  Critical Care Medicine; Internal Medicine; Pulmonary Disease; Sleep Medicine  [address]  [place] [address]  Phone: [phone]  Fax: [phone]  Follow Up Time:     Molina Adamson)  Internal Medicine  [address]  [place] [address]  Phone: [phone]  Fax: [phone]  Follow Up Time: Magdaleno Mathews)  Critical Care Medicine; Internal Medicine; Pulmonary Disease; Sleep Medicine  [address]  [place] [address]  Phone: [phone]  Fax: [phone]  Follow Up Time:     Molina Adamson)  Internal Medicine  [address]  [place] [address]  Phone: [phone]  Fax: [phone]  Follow Up Time:     Jono Lopez)  Hematology; Internal Medicine; Medical Oncology  [address] [place] [address]  Phone: [phone]  Fax: [phone]  Follow Up Time: Magdaleno Mathews)  Critical Care Medicine; Internal Medicine; Pulmonary Disease; Sleep Medicine  [address] [facility] [facility] [address] [facility] [address]  [place] [address]  Phone: [phone]  Fax: [phone]  Follow Up Time:     Jono Lopez)  Hematology; Internal Medicine; Medical Oncology  [address] [place] [address]  Phone: [phone]  Fax: [phone]  Follow Up Time:

## 2020-02-11 NOTE — CONSULT NOTE ADULT - SUBJECTIVE AND OBJECTIVE BOX
CHIEF COMPLAINT:    HPI:  49 M, no medical history, presented with chest pain and dyspnea starting Sunday morning. No syncope, palpitations, lower extremity pain or swelling, weight loss, fevers, night sweats. Last seen 3/2019 when he was diagnosed with rhinovirus. At the time had persistent leukocytosis...      PAST MEDICAL & SURGICAL HISTORY:  No pertinent past medical history  S/P wrist surgery: Left wrist      FAMILY HISTORY:  Family history of cancer in mother: Unclear what type of cancer      SOCIAL HISTORY:  Smoking: [ ] Never Smoked [ ] Former Smoker (__ packs x ___ years) [ ] Current Smoker  (__ packs x ___ years)  Substance Use: [ ] Never Used [ ] Used ____  EtOH Use:  Marital Status: [ ] Single [ ]  [ ]  [ ]   Sexual History:   Occupation:  Recent Travel:  Country of Birth:  Advance Directives:    Allergies    No Known Allergies    Intolerances        HOME MEDICATIONS:    REVIEW OF SYSTEMS:  Constitutional: [ ] negative [-] fevers [-] chills [ ] weight loss [ ] weight gain  HEENT: [ ] negative [ ] dry eyes [ ] eye irritation [ ] postnasal drip [ ] nasal congestion  CV: [ ] negative  [-] chest pain [-] orthopnea [-] palpitations [ ] murmur  Resp: [ ] negative [-] cough [-] shortness of breath [-] wheezing [-] sputum [-] hemoptysis  GI: [ ] negative [-] nausea [-] vomiting [-] diarrhea [-] constipation [-] abd pain [ ] dysphagia   : [ ] negative [-] dysuria [ ] nocturia [ ] hematuria [ ] increased urinary frequency  Musculoskeletal: [ ] negative [ ] back pain [-] myalgias [-] arthralgias [ ] fracture  Skin: [ ] negative [-] rash [ ] itch  Neurological: [ ] negative [-] headache [-] dizziness [ ] syncope [ ] weakness [ ] numbness  Psychiatric: [ ] negative [ ] anxiety [ ] depression  Endocrine: [ ] negative [ ] diabetes [ ] thyroid problem  Hematologic/Lymphatic: [ ] negative [ ] anemia [ ] bleeding problem  Allergic/Immunologic: [ ] negative [ ] itchy eyes [ ] nasal discharge [ ] hives [ ] angioedema  [ ] All other systems negative  [ ] Unable to assess ROS because ________    OBJECTIVE:  ICU Vital Signs Last 24 Hrs  T(C): 37.1 (11 Feb 2020 06:10), Max: 37.2 (11 Feb 2020 02:10)  T(F): 98.7 (11 Feb 2020 06:10), Max: 98.9 (11 Feb 2020 02:10)  HR: 96 (11 Feb 2020 06:10) (96 - 106)  BP: 109/69 (11 Feb 2020 06:10) (109/69 - 141/81)  BP(mean): --  ABP: --  ABP(mean): --  RR: 18 (11 Feb 2020 06:10) (18 - 24)  SpO2: 97% (11 Feb 2020 06:10) (90% - 100%)        CAPILLARY BLOOD GLUCOSE          PHYSICAL EXAM:  General: No apparent distress  HEENT: NC/AT  Lymph Nodes: No supraclavicular or cervical lymphadenopathy  Neck: Supple  Respiratory: CTAB, no wheezing or crackles, good air entry  Cardiovascular: RRR, no murmur, no LE edema  Abdomen: Soft, nontender, nondistended  Extremities: Warm  Skin: Intact  Neurological: A&Ox3, no focal deficits  Psychiatry: Normal mood and affect    HOSPITAL MEDICATIONS:  heparin  Infusion.  Unit(s)/Hr IV Continuous <Continuous>                      influenza   Vaccine 0.5 milliLiter(s) IntraMuscular once          LABS:                        12.6   15.55 )-----------( 289      ( 11 Feb 2020 06:25 )             35.9     Hgb Trend: 12.6<--, 12.5<--, 11.9<--, 12.3<--, 12.6<--  02-11    142  |  106  |  10  ----------------------------<  91  4.2   |  23  |  1.09    Ca    8.7      11 Feb 2020 06:25  Phos  3.7     02-11  Mg     2.0     02-11    TPro  7.4  /  Alb  4.2  /  TBili  1.6<H>  /  DBili  x   /  AST  35  /  ALT  20  /  AlkPhos  69  02-10    Creatinine Trend: 1.09<--, 1.23<--  PT/INR - ( 10 Feb 2020 11:35 )   PT: 13.6 SEC;   INR: 1.22          PTT - ( 11 Feb 2020 02:04 )  PTT:59.9 SEC      Venous Blood Gas:  02-10 @ 11:35  7.37/48/34/25/54.5  VBG Lactate: 1.7      MICROBIOLOGY:     RADIOLOGY:  [ ] Reviewed and interpreted by me    ASSESSMENT AND RECOMMENDATION: CHIEF COMPLAINT:  Chest pain, dyspnea    HPI:  49 M, no medical history, presented with chest pain and dyspnea starting Sunday morning. Denies syncope, palpitations, lower extremity pain or swelling, weight loss, fevers, night sweats. No sick contacts. No recent travel history. Last seen 3/2019 when he was diagnosed with rhinovirus. At the time had persistent leukocytosis with smudge cells noted on smear. Did not follow up as outpatient. CTPA done here shows bilateral subsegmental PE and right sided opacities and lymphadenopathy. He was started on a heparin gtt. Pulmonary consulted to evaluate for bronchoscopy.    He denies tobacco use, alcohol, drug use. Originally from [place] moved here 30 years ago. Lives with his sister. 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 Denies occupational work exposure.        PAST MEDICAL & SURGICAL HISTORY:  No pertinent past medical history  S/P wrist surgery: Left wrist      FAMILY HISTORY:  Family history of cancer in mother: Unclear what type of cancer      SOCIAL HISTORY:  Smoking: [x] Never Smoked [ ] Former Smoker (__ packs x ___ years) [ ] Current Smoker  (__ packs x ___ years)  Substance Use: [x] Never Used [ ] Used ____  EtOH Use: Denies  Marital Status: [ ] Single [ ]  [ ]  [ ]   Sexual History:   Occupation: Works at [employer]  Recent Travel: No  Country of Birth: [place]  Advance Directives:    Allergies    No Known Allergies    Intolerances        HOME MEDICATIONS:    REVIEW OF SYSTEMS:  Constitutional: [ ] negative [-] fevers [-] chills [ ] weight loss [ ] weight gain  HEENT: [ ] negative [ ] dry eyes [ ] eye irritation [ ] postnasal drip [ ] nasal congestion  CV: [ ] negative  [+] chest pain [-] orthopnea [-] palpitations [ ] murmur  Resp: [ ] negative [-] cough [+] shortness of breath [-] wheezing [-] sputum [-] hemoptysis  GI: [ ] negative [-] nausea [-] vomiting [-] diarrhea [-] constipation [-] abd pain [ ] dysphagia   : [ ] negative [-] dysuria [ ] nocturia [ ] hematuria [ ] increased urinary frequency  Musculoskeletal: [ ] negative [ ] back pain [-] myalgias [-] arthralgias [ ] fracture  Skin: [ ] negative [-] rash [ ] itch  Neurological: [ ] negative [-] headache [-] dizziness [ ] syncope [ ] weakness [ ] numbness  Psychiatric: [ ] negative [ ] anxiety [ ] depression  Endocrine: [ ] negative [ ] diabetes [ ] thyroid problem  Hematologic/Lymphatic: [ ] negative [ ] anemia [ ] bleeding problem  Allergic/Immunologic: [ ] negative [ ] itchy eyes [ ] nasal discharge [ ] hives [ ] angioedema  [ ] All other systems negative  [ ] Unable to assess ROS because ________    OBJECTIVE:  ICU Vital Signs Last 24 Hrs  T(C): 37.1 (11 Feb 2020 06:10), Max: 37.2 (11 Feb 2020 02:10)  T(F): 98.7 (11 Feb 2020 06:10), Max: 98.9 (11 Feb 2020 02:10)  HR: 96 (11 Feb 2020 06:10) (96 - 106)  BP: 109/69 (11 Feb 2020 06:10) (109/69 - 141/81)  BP(mean): --  ABP: --  ABP(mean): --  RR: 18 (11 Feb 2020 06:10) (18 - 24)  SpO2: 97% (11 Feb 2020 06:10) (90% - 100%)        CAPILLARY BLOOD GLUCOSE          PHYSICAL EXAM:  General: No apparent distress  HEENT: NC/AT  Neck: Supple  Respiratory: CTAB, no wheezing or crackles, good air entry  Cardiovascular: RRR, no murmur, no LE edema  Abdomen: Soft, nontender, nondistended  Extremities: Warm  Skin: Intact  Neurological: A&Ox3, no focal deficits  Psychiatry: Normal mood and affect    HOSPITAL MEDICATIONS:  heparin  Infusion.  Unit(s)/Hr IV Continuous <Continuous>          influenza   Vaccine 0.5 milliLiter(s) IntraMuscular once          LABS:                        12.6   15.55 )-----------( 289      ( 11 Feb 2020 06:25 )             35.9     Hgb Trend: 12.6<--, 12.5<--, 11.9<--, 12.3<--, 12.6<--  02-11    142  |  106  |  10  ----------------------------<  91  4.2   |  23  |  1.09    Ca    8.7      11 Feb 2020 06:25  Phos  3.7     02-11  Mg     2.0     02-11    TPro  7.4  /  Alb  4.2  /  TBili  1.6<H>  /  DBili  x   /  AST  35  /  ALT  20  /  AlkPhos  69  02-10    Creatinine Trend: 1.09<--, 1.23<--  PT/INR - ( 10 Feb 2020 11:35 )   PT: 13.6 SEC;   INR: 1.22          PTT - ( 11 Feb 2020 02:04 )  PTT:59.9 SEC      Venous Blood Gas:  02-10 @ 11:35  7.37/48/34/25/54.5  VBG Lactate: 1.7      MICROBIOLOGY:     RADIOLOGY:  [x] Reviewed and interpreted by me  CT Chest 2/10/2020  Bilateral PE. Right sided opacities. Large PA. LAD.    ASSESSMENT AND RECOMMENDATION:    49 M, no medical history, presented with chest pain and dyspnea. Found to have bilateral segmental and subsegmental PE as well as multiple pulmonary opacities and LAD. Unclear etiology of this unprovoked submassive PE. CHIEF COMPLAINT:  Chest pain, dyspnea    HPI:  49 M, no medical history, presented with chest pain and dyspnea starting Sunday morning. Denies syncope, palpitations, lower extremity pain or swelling, weight loss, fevers, night sweats. No sick contacts. No recent travel history. Last seen 3/2019 when he was diagnosed with rhinovirus. At the time had persistent leukocytosis with smudge cells noted on smear. Did not follow up as outpatient. CTPA done here shows bilateral subsegmental PE and right sided opacities and lymphadenopathy. He was started on a heparin gtt. Pulmonary consulted to evaluate for bronchoscopy.    He denies tobacco use, alcohol, drug use. Originally from [place] moved here 30 years ago. Lives with his sister. 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 Denies occupational work exposure.        PAST MEDICAL & SURGICAL HISTORY:  No pertinent past medical history  S/P wrist surgery: Left wrist      FAMILY HISTORY:  Family history of cancer in mother: Unclear what type of cancer      SOCIAL HISTORY:  Smoking: [x] Never Smoked [ ] Former Smoker (__ packs x ___ years) [ ] Current Smoker  (__ packs x ___ years)  Substance Use: [x] Never Used [ ] Used ____  EtOH Use: Denies  Marital Status: [ ] Single [ ]  [ ]  [ ]   Sexual History:   Occupation: Works at [employer]  Recent Travel: No  Country of Birth: [place]  Advance Directives:    Allergies    No Known Allergies    Intolerances        HOME MEDICATIONS:    REVIEW OF SYSTEMS:  Constitutional: [ ] negative [-] fevers [-] chills [ ] weight loss [ ] weight gain  HEENT: [ ] negative [ ] dry eyes [ ] eye irritation [ ] postnasal drip [ ] nasal congestion  CV: [ ] negative  [+] chest pain [-] orthopnea [-] palpitations [ ] murmur  Resp: [ ] negative [-] cough [+] shortness of breath [-] wheezing [-] sputum [-] hemoptysis  GI: [ ] negative [-] nausea [-] vomiting [-] diarrhea [-] constipation [-] abd pain [ ] dysphagia   : [ ] negative [-] dysuria [ ] nocturia [ ] hematuria [ ] increased urinary frequency  Musculoskeletal: [ ] negative [ ] back pain [-] myalgias [-] arthralgias [ ] fracture  Skin: [ ] negative [-] rash [ ] itch  Neurological: [ ] negative [-] headache [-] dizziness [ ] syncope [ ] weakness [ ] numbness  Psychiatric: [ ] negative [ ] anxiety [ ] depression  Endocrine: [ ] negative [ ] diabetes [ ] thyroid problem  Hematologic/Lymphatic: [ ] negative [ ] anemia [ ] bleeding problem  Allergic/Immunologic: [ ] negative [ ] itchy eyes [ ] nasal discharge [ ] hives [ ] angioedema  [ ] All other systems negative  [ ] Unable to assess ROS because ________    OBJECTIVE:  ICU Vital Signs Last 24 Hrs  T(C): 37.1 (11 Feb 2020 06:10), Max: 37.2 (11 Feb 2020 02:10)  T(F): 98.7 (11 Feb 2020 06:10), Max: 98.9 (11 Feb 2020 02:10)  HR: 96 (11 Feb 2020 06:10) (96 - 106)  BP: 109/69 (11 Feb 2020 06:10) (109/69 - 141/81)  BP(mean): --  ABP: --  ABP(mean): --  RR: 18 (11 Feb 2020 06:10) (18 - 24)  SpO2: 97% (11 Feb 2020 06:10) (90% - 100%)        CAPILLARY BLOOD GLUCOSE          PHYSICAL EXAM:  General: No apparent distress  HEENT: NC/AT  Neck: Supple  Respiratory: CTAB, no wheezing or crackles, good air entry  Cardiovascular: RRR, no murmur, 1-2+ LE edema  Abdomen: Soft, nontender, nondistended  Extremities: Warm  Skin: Intact  Neurological: A&Ox3, no focal deficits  Psychiatry: Normal mood and affect    HOSPITAL MEDICATIONS:  heparin  Infusion.  Unit(s)/Hr IV Continuous <Continuous>          influenza   Vaccine 0.5 milliLiter(s) IntraMuscular once          LABS:                        12.6   15.55 )-----------( 289      ( 11 Feb 2020 06:25 )             35.9     Hgb Trend: 12.6<--, 12.5<--, 11.9<--, 12.3<--, 12.6<--  02-11    142  |  106  |  10  ----------------------------<  91  4.2   |  23  |  1.09    Ca    8.7      11 Feb 2020 06:25  Phos  3.7     02-11  Mg     2.0     02-11    TPro  7.4  /  Alb  4.2  /  TBili  1.6<H>  /  DBili  x   /  AST  35  /  ALT  20  /  AlkPhos  69  02-10    Creatinine Trend: 1.09<--, 1.23<--  PT/INR - ( 10 Feb 2020 11:35 )   PT: 13.6 SEC;   INR: 1.22          PTT - ( 11 Feb 2020 02:04 )  PTT:59.9 SEC      Venous Blood Gas:  02-10 @ 11:35  7.37/48/34/25/54.5  VBG Lactate: 1.7      MICROBIOLOGY:     RADIOLOGY:  [x] Reviewed and interpreted by me  CT Chest 2/10/2020  Bilateral PE. Right sided opacities. Large PA. LAD.    ASSESSMENT AND RECOMMENDATION:    49 M, no medical history, presented with chest pain and dyspnea. Found to have bilateral segmental and subsegmental PE as well as multiple pulmonary opacities and LAD. Unclear etiology of this unprovoked submassive PE (given elevated troponin levels).    Submassive PE  TTE to evaluate right sided function and pulmonary pressures  Basic rheum work up to assess for underlying inflammatory process  Anticoagulate, Supplemental O2    Persistent leukocytosis  No clinical or other laboratory evidence of infection  Smudge cells noted on prior differential  Would check peripheral flow cytometry    LAD, Nodules  Unclear etiology  Will likely eventually need bronchoscopy; however, given acute VTE would defer biopsy for now pending further work up    Jono Gregory MD  Pulmonary and Critical Care Fellow  [phone]

## 2020-02-11 NOTE — PROGRESS NOTE ADULT - PROBLEM SELECTOR PLAN 1
Found to have bilateral segmental and subsegmental pulmonary emboli, unprovoked. Denied immobilization, recent travel, or surgeries. Elevated D-dimer and troponinemia with evidence of right heart strain on ECG.   -cw heparin gtt for now  -malignancy workup as below Found to have bilateral segmental and subsegmental pulmonary emboli, unprovoked. Denied immobilization, recent travel, or surgeries. Elevated D-dimer and troponinemia with evidence of right heart strain on ECG. repeat troponins downtrending.   -cw heparin gtt   -malignancy workup as below

## 2020-02-12 LAB
ACE SERPL-CCNC: 29 U/L — SIGNIFICANT CHANGE UP (ref 14–82)
ANION GAP SERPL CALC-SCNC: 10 MMO/L — SIGNIFICANT CHANGE UP (ref 7–14)
APTT BLD: 88 SEC — HIGH (ref 27.5–36.3)
BASOPHILS # BLD AUTO: 0.09 K/UL — SIGNIFICANT CHANGE UP (ref 0–0.2)
BASOPHILS NFR BLD AUTO: 0.7 % — SIGNIFICANT CHANGE UP (ref 0–2)
BUN SERPL-MCNC: 10 MG/DL — SIGNIFICANT CHANGE UP (ref 7–23)
CALCIUM SERPL-MCNC: 8.8 MG/DL — SIGNIFICANT CHANGE UP (ref 8.4–10.5)
CHLORIDE SERPL-SCNC: 104 MMOL/L — SIGNIFICANT CHANGE UP (ref 98–107)
CO2 SERPL-SCNC: 24 MMOL/L — SIGNIFICANT CHANGE UP (ref 22–31)
CREAT SERPL-MCNC: 1.16 MG/DL — SIGNIFICANT CHANGE UP (ref 0.5–1.3)
EOSINOPHIL # BLD AUTO: 0.73 K/UL — HIGH (ref 0–0.5)
EOSINOPHIL NFR BLD AUTO: 5.6 % — SIGNIFICANT CHANGE UP (ref 0–6)
GLUCOSE SERPL-MCNC: 102 MG/DL — HIGH (ref 70–99)
HCT VFR BLD CALC: 35.1 % — LOW (ref 39–50)
HCT VFR BLD CALC: 35.1 % — LOW (ref 39–50)
HGB BLD-MCNC: 12.5 G/DL — LOW (ref 13–17)
HGB BLD-MCNC: 12.5 G/DL — LOW (ref 13–17)
IMM GRANULOCYTES NFR BLD AUTO: 0.5 % — SIGNIFICANT CHANGE UP (ref 0–1.5)
LYMPHOCYTES # BLD AUTO: 27.5 % — SIGNIFICANT CHANGE UP (ref 13–44)
LYMPHOCYTES # BLD AUTO: 3.58 K/UL — HIGH (ref 1–3.3)
MAGNESIUM SERPL-MCNC: 2.1 MG/DL — SIGNIFICANT CHANGE UP (ref 1.6–2.6)
MCHC RBC-ENTMCNC: 28.7 PG — SIGNIFICANT CHANGE UP (ref 27–34)
MCHC RBC-ENTMCNC: 28.7 PG — SIGNIFICANT CHANGE UP (ref 27–34)
MCHC RBC-ENTMCNC: 35.6 % — SIGNIFICANT CHANGE UP (ref 32–36)
MCHC RBC-ENTMCNC: 35.6 % — SIGNIFICANT CHANGE UP (ref 32–36)
MCV RBC AUTO: 80.7 FL — SIGNIFICANT CHANGE UP (ref 80–100)
MCV RBC AUTO: 80.7 FL — SIGNIFICANT CHANGE UP (ref 80–100)
MONOCYTES # BLD AUTO: 1.31 K/UL — HIGH (ref 0–0.9)
MONOCYTES NFR BLD AUTO: 10.1 % — SIGNIFICANT CHANGE UP (ref 2–14)
NEUTROPHILS # BLD AUTO: 7.23 K/UL — SIGNIFICANT CHANGE UP (ref 1.8–7.4)
NEUTROPHILS NFR BLD AUTO: 55.6 % — SIGNIFICANT CHANGE UP (ref 43–77)
NRBC # FLD: 0.53 K/UL — SIGNIFICANT CHANGE UP (ref 0–0)
NRBC # FLD: 0.53 K/UL — SIGNIFICANT CHANGE UP (ref 0–0)
NRBC FLD-RTO: 4.1 — SIGNIFICANT CHANGE UP
NRBC FLD-RTO: 4.1 — SIGNIFICANT CHANGE UP
PHOSPHATE SERPL-MCNC: 3.9 MG/DL — SIGNIFICANT CHANGE UP (ref 2.5–4.5)
PLATELET # BLD AUTO: 299 K/UL — SIGNIFICANT CHANGE UP (ref 150–400)
PLATELET # BLD AUTO: 299 K/UL — SIGNIFICANT CHANGE UP (ref 150–400)
PMV BLD: 10.2 FL — SIGNIFICANT CHANGE UP (ref 7–13)
PMV BLD: 10.2 FL — SIGNIFICANT CHANGE UP (ref 7–13)
POTASSIUM SERPL-MCNC: 4 MMOL/L — SIGNIFICANT CHANGE UP (ref 3.5–5.3)
POTASSIUM SERPL-SCNC: 4 MMOL/L — SIGNIFICANT CHANGE UP (ref 3.5–5.3)
RBC # BLD: 4.35 M/UL — SIGNIFICANT CHANGE UP (ref 4.2–5.8)
RBC # BLD: 4.35 M/UL — SIGNIFICANT CHANGE UP (ref 4.2–5.8)
RBC # FLD: 20.3 % — HIGH (ref 10.3–14.5)
RBC # FLD: 20.3 % — HIGH (ref 10.3–14.5)
SODIUM SERPL-SCNC: 138 MMOL/L — SIGNIFICANT CHANGE UP (ref 135–145)
WBC # BLD: 13 K/UL — HIGH (ref 3.8–10.5)
WBC # BLD: 13 K/UL — HIGH (ref 3.8–10.5)
WBC # FLD AUTO: 13 K/UL — HIGH (ref 3.8–10.5)
WBC # FLD AUTO: 13 K/UL — HIGH (ref 3.8–10.5)

## 2020-02-12 PROCEDURE — 99254 IP/OBS CNSLTJ NEW/EST MOD 60: CPT | Mod: GC

## 2020-02-12 PROCEDURE — 93306 TTE W/DOPPLER COMPLETE: CPT | Mod: 26

## 2020-02-12 PROCEDURE — 99233 SBSQ HOSP IP/OBS HIGH 50: CPT | Mod: GC

## 2020-02-12 RX ORDER — APIXABAN 2.5 MG/1
1 TABLET, FILM COATED ORAL
Qty: 42 | Refills: 0
Start: 2020-02-12 | End: 2020-03-03

## 2020-02-12 RX ORDER — APIXABAN 2.5 MG/1
2 TABLET, FILM COATED ORAL
Qty: 28 | Refills: 0
Start: 2020-02-12 | End: 2020-02-18

## 2020-02-12 RX ORDER — APIXABAN 2.5 MG/1
2 TABLET, FILM COATED ORAL
Qty: 120 | Refills: 0
Start: 2020-02-12 | End: 2020-03-12

## 2020-02-12 RX ADMIN — HEPARIN SODIUM 1600 UNIT(S)/HR: 5000 INJECTION INTRAVENOUS; SUBCUTANEOUS at 07:05

## 2020-02-12 NOTE — CONSULT NOTE ADULT - ASSESSMENT
Patient is a 49 year old male with no significant past medical history presented to ED for chest pain and shortness of breath, found to have PE. Hematology consulted for leukocytosis.    # R/o malignancy  - Leukocytosis with lymphocyte predominance, but ALC still below 5k  - On CT chest, he had enlarged 1.6 x 1.4 and 2.0x1.7 lymph nodes, unclear significance  - Smear reviewed: anisocytosis, few nucleated rbcs, atypical lymphocytes, several monocytes, multiple target cells, occasional tear drop cells  - Will plan to perform BM bx tomorrow  - Can transition to DOAC for AC  - Outpatient referral to [facility] [place] to discuss BM biopsy and to have hypercoagulable workup done, if workup is negative for malignancy.    Farida Hutchins  Hematology-Oncology PGY-5  [phone]

## 2020-02-12 NOTE — PROGRESS NOTE ADULT - PROBLEM SELECTOR PLAN 2
Noted to have hilar LAD on CTA, new from prior CTA from 3/2019. Denies hemoptysis, weight loss, fever, chills, or night sweats. Is from [place] but emigrated 20 years ago and no recent travel. Low concern for infectious etiology. In setting of unprovoked PE and leukocytosis with smudge cells, suspect underlying malignancy.   -pulm consulted for possible bx, deferring at this time in light of acute PE. will need follow up CT scan in 4-6 weeks if persistent infiltrate in lung parenchyma, may need biopsy.

## 2020-02-12 NOTE — CONSULT NOTE ADULT - SUBJECTIVE AND OBJECTIVE BOX
HPI:  49 year old male with no significant past medical history presented to ED for chest pain and shortness of breath which began the night prior to admission. He states he was watching the [employer] and suddenly felt pressure in his chest associated with shortness of breath. Denied fever, chills, nausea, vomiting. History of recent travel, clotting disorders, or malignancy. However of note on patient's prior admission in March 2019 for sepsis 2/2 to rhinovirus. Pt was noted to have leukocytosis which did not resolve after resolution of viral illness, also noted to have smudge cells on peripheral smear at that time. He was urged to follow up with a PCP after discharge but was lost to follow up. States he had colonoscopy at age 42 which he reports was fine. No night sweats, fatigue, or recent weight loss. No changes in bowel habits or urinary symptoms. Does not recall any family history of cancer. Works at [employer] and is active on his feet daily. No recent immobilization or surgeries. CT A/P on prior admission 3/2019 was unremarkable.   In ED pt was afebrile, tachycardic to 106, /79, RR 24, satting 90% on RA. EKG with TWI in V2-V3. Troponins elevated at 243 >> 246. Started on heparin gtt. (10 Feb 2020 17:23)      PAST MEDICAL & SURGICAL HISTORY:  No pertinent past medical history  S/P wrist surgery: Left wrist      Review of Systems:   CONSTITUTIONAL: No fever, weight loss, or fatigue  EYES: No eye pain, visual disturbances, or discharge  ENMT:  No difficulty hearing, tinnitus, vertigo; No sinus or throat pain  NECK: No pain or stiffness  BREASTS: No pain, masses, or nipple discharge  RESPIRATORY: No cough, wheezing, chills or hemoptysis; No shortness of breath  CARDIOVASCULAR: No chest pain, palpitations, dizziness, or leg swelling  GASTROINTESTINAL: No abdominal or epigastric pain. No nausea, vomiting, or hematemesis; No diarrhea or constipation. No melena or hematochezia.  GENITOURINARY: No dysuria, frequency, hematuria, or incontinence  NEUROLOGICAL: No headaches, memory loss, loss of strength, numbness, or tremors  SKIN: No itching, burning, rashes, or lesions   LYMPH NODES: No enlarged glands  ENDOCRINE: No heat or cold intolerance; No hair loss  MUSCULOSKELETAL: No joint pain or swelling; No muscle, back, or extremity pain  PSYCHIATRIC: No depression, anxiety, mood swings, or difficulty sleeping  HEME/LYMPH: No easy bruising, or bleeding gums  ALLERY AND IMMUNOLOGIC: No hives or eczema    Allergies    No Known Allergies    Intolerances    Social History: No smoking, EtOH, or illicit drug use, Lives at home with his sisters, Works at [employer], Originally from [place]    FAMILY HISTORY:  Family history of cancer in mother: Unclear what type of cancer      MEDICATIONS  (STANDING):  heparin  Infusion.  Unit(s)/Hr (16 mL/Hr) IV Continuous <Continuous>  influenza   Vaccine 0.5 milliLiter(s) IntraMuscular once    MEDICATIONS  (PRN):  heparin  Injectable 7000 Unit(s) IV Push every 6 hours PRN For aPTT less than 40  heparin  Injectable 3500 Unit(s) IV Push every 6 hours PRN For aPTT between 40 - 57        CAPILLARY BLOOD GLUCOSE        I&O's Summary    Vital Signs Last 24 Hrs  T(C): 36.5 (12 Feb 2020 12:49), Max: 37.4 (11 Feb 2020 18:15)  T(F): 97.7 (12 Feb 2020 12:49), Max: 99.3 (11 Feb 2020 18:15)  HR: 92 (12 Feb 2020 12:49) (92 - 106)  BP: 126/68 (12 Feb 2020 12:49) (116/73 - 137/86)  BP(mean): --  RR: 18 (12 Feb 2020 12:49) (16 - 20)  SpO2: 94% (12 Feb 2020 12:49) (94% - 100%)    PHYSICAL EXAM:  GENERAL: NAD, Laying in bed  HEENT: NC/AT, MMM  NECK: Supple  CHEST/LUNG: Grossly CTAB; No wheeze appreciated, Respirations nonlabored  HEART: Mild tachycardia, Regular rhythm  ABDOMEN: +BS, Soft, NT, No rigidity  EXTREMITIES: No LE edema  NEUROLOGY: A+Ox3, No focal deficits  SKIN: Warm and dry  PSYCH: Calm and cooperative    LABS:                        12.5   13.00 )-----------( 299      ( 12 Feb 2020 06:00 )             35.1     02-12    138  |  104  |  10  ----------------------------<  102<H>  4.0   |  24  |  1.16    Ca    8.8      12 Feb 2020 06:00  Phos  3.9     02-12  Mg     2.1     02-12      PTT - ( 12 Feb 2020 05:50 )  PTT:88.0 SEC    RADIOLOGY & ADDITIONAL TESTS:  Studies reviewed. HPI:  Patient is a 50 y/o M w/ no prior PMHx (does not see a PMD) who initially presented to the ED with acute-onset shortness of breath and chest pain, found to have bilateral segmental and subsegmental PEs, and was admitted to Medicine for further management. pulmonary emboli, new from the prior exam.      PAST MEDICAL & SURGICAL HISTORY:  No pertinent past medical history  S/P wrist surgery: Left wrist      Review of Systems:   CONSTITUTIONAL: No fever, weight loss, or fatigue  EYES: No eye pain, visual disturbances, or discharge  ENMT:  No difficulty hearing, tinnitus, vertigo; No sinus or throat pain  NECK: No pain or stiffness  BREASTS: No pain, masses, or nipple discharge  RESPIRATORY: No cough, wheezing, chills or hemoptysis; No shortness of breath  CARDIOVASCULAR: No chest pain, palpitations, dizziness, or leg swelling  GASTROINTESTINAL: No abdominal or epigastric pain. No nausea, vomiting, or hematemesis; No diarrhea or constipation. No melena or hematochezia.  GENITOURINARY: No dysuria, frequency, hematuria, or incontinence  NEUROLOGICAL: No headaches, memory loss, loss of strength, numbness, or tremors  SKIN: No itching, burning, rashes, or lesions   LYMPH NODES: No enlarged glands  ENDOCRINE: No heat or cold intolerance; No hair loss  MUSCULOSKELETAL: No joint pain or swelling; No muscle, back, or extremity pain  PSYCHIATRIC: No depression, anxiety, mood swings, or difficulty sleeping  HEME/LYMPH: No easy bruising, or bleeding gums  ALLERY AND IMMUNOLOGIC: No hives or eczema    Allergies    No Known Allergies    Intolerances    Social History: No smoking, EtOH, or illicit drug use, Lives at home with his sisters, Works at [employer], Originally from [place]    FAMILY HISTORY:  Family history of cancer in mother: Unclear what type of cancer      MEDICATIONS  (STANDING):  heparin  Infusion.  Unit(s)/Hr (16 mL/Hr) IV Continuous <Continuous>  influenza   Vaccine 0.5 milliLiter(s) IntraMuscular once    MEDICATIONS  (PRN):  heparin  Injectable 7000 Unit(s) IV Push every 6 hours PRN For aPTT less than 40  heparin  Injectable 3500 Unit(s) IV Push every 6 hours PRN For aPTT between 40 - 57        CAPILLARY BLOOD GLUCOSE        I&O's Summary    Vital Signs Last 24 Hrs  T(C): 36.5 (12 Feb 2020 12:49), Max: 37.4 (11 Feb 2020 18:15)  T(F): 97.7 (12 Feb 2020 12:49), Max: 99.3 (11 Feb 2020 18:15)  HR: 92 (12 Feb 2020 12:49) (92 - 106)  BP: 126/68 (12 Feb 2020 12:49) (116/73 - 137/86)  BP(mean): --  RR: 18 (12 Feb 2020 12:49) (16 - 20)  SpO2: 94% (12 Feb 2020 12:49) (94% - 100%)    PHYSICAL EXAM:  GENERAL: NAD, Laying in bed  HEENT: NC/AT, MMM  NECK: Supple  CHEST/LUNG: Grossly CTAB; No wheeze appreciated, Respirations nonlabored  HEART: Mild tachycardia, Regular rhythm  ABDOMEN: +BS, Soft, NT, No rigidity  EXTREMITIES: No LE edema  NEUROLOGY: A+Ox3, No focal deficits  SKIN: Warm and dry  PSYCH: Calm and cooperative    LABS:                        12.5   13.00 )-----------( 299      ( 12 Feb 2020 06:00 )             35.1     02-12    138  |  104  |  10  ----------------------------<  102<H>  4.0   |  24  |  1.16    Ca    8.8      12 Feb 2020 06:00  Phos  3.9     02-12  Mg     2.1     02-12      PTT - ( 12 Feb 2020 05:50 )  PTT:88.0 SEC    RADIOLOGY & ADDITIONAL TESTS:  Studies reviewed. HPI:  Patient is a 49 year old male with no significant past medical history presented to ED for chest pain and shortness of breath x 1 day. Per patient, he was on his usual state of health until Sunday night when he started experiencing an acute onset of shortness of breath associated with pleuritic chest pain for which he presented to the ED. On arrival, he underwent a CTA of chest which was remarkable for bilateral segmental and subsegmental pulmonary emboli, new from the prior exam, patchy bilateral opacities, new from the prior, of uncertain etiology. He was started on heparin drip. Hematology was consulted for leukocytosis.     Today, patient denies fever, chills, nausea, vomiting, diarrhea, abdominal pain. He denies immobility or recent travel. No family hx of clotting disorders. Denies weight loss.       PAST MEDICAL & SURGICAL HISTORY:  No pertinent past medical history  S/P wrist surgery: Left wrist      Review of Systems:   CONSTITUTIONAL: No fever, chills, or weight loss  EYES: No eye pain or discharge  ENMT: No sinus or throat pain  NECK: No pain or stiffness  RESPIRATORY: + Shortness of breath (improved), No cough  CARDIOVASCULAR: + Chest pain (improved), No palpitations  GASTROINTESTINAL: No nausea, vomiting, or abdominal pain  GENITOURINARY: No dysuria or hematuria  NEUROLOGICAL: No headache or syncope  SKIN: No itching or rash  MUSCULOSKELETAL: No joint pain or back pain    Allergies    No Known Allergies    Intolerances    Social History: No smoking, EtOH, or illicit drug use, Lives at home with his sisters, Works at [employer], Originally from [place]    FAMILY HISTORY:  Family history of cancer in mother: Unclear what type of cancer      MEDICATIONS  (STANDING):  heparin  Infusion.  Unit(s)/Hr (16 mL/Hr) IV Continuous <Continuous>  influenza   Vaccine 0.5 milliLiter(s) IntraMuscular once    MEDICATIONS  (PRN):  heparin  Injectable 7000 Unit(s) IV Push every 6 hours PRN For aPTT less than 40  heparin  Injectable 3500 Unit(s) IV Push every 6 hours PRN For aPTT between 40 - 57        CAPILLARY BLOOD GLUCOSE        I&O's Summary    Vital Signs Last 24 Hrs  T(C): 36.5 (12 Feb 2020 12:49), Max: 37.4 (11 Feb 2020 18:15)  T(F): 97.7 (12 Feb 2020 12:49), Max: 99.3 (11 Feb 2020 18:15)  HR: 92 (12 Feb 2020 12:49) (92 - 106)  BP: 126/68 (12 Feb 2020 12:49) (116/73 - 137/86)  BP(mean): --  RR: 18 (12 Feb 2020 12:49) (16 - 20)  SpO2: 94% (12 Feb 2020 12:49) (94% - 100%)    PHYSICAL EXAM:  GENERAL: NAD, Laying in bed  HEENT: NC/AT, MMM  NECK: Supple  CHEST/LUNG: Grossly CTAB; No wheeze appreciated, Respirations nonlabored  HEART: Mild tachycardia, Regular rhythm  ABDOMEN: +BS, Soft, NT, No rigidity  EXTREMITIES: No LE edema  NEUROLOGY: A+Ox3, No focal deficits  SKIN: Warm and dry  PSYCH: Calm and cooperative    LABS:                        12.5   13.00 )-----------( 299      ( 12 Feb 2020 06:00 )             35.1     02-12    138  |  104  |  10  ----------------------------<  102<H>  4.0   |  24  |  1.16    Ca    8.8      12 Feb 2020 06:00  Phos  3.9     02-12  Mg     2.1     02-12      PTT - ( 12 Feb 2020 05:50 )  PTT:88.0 SEC    RADIOLOGY & ADDITIONAL TESTS:  Studies reviewed.    Peripheral smear reviewed: Anisocytosis, few nucleated rbcs, atypical lymphocytes, several monocytes, multiple target cells, occasional tear drop cells

## 2020-02-12 NOTE — PROGRESS NOTE ADULT - PROBLEM SELECTOR PLAN 6
SCr at 1.23, in 3/2019 was 1.03. Likely in setting of poor forward flow 2/2 to PE.   -continue to monitor, improving. SCr at 1.23, in 3/2019 was 1.03. Likely in setting of poor forward flow 2/2 to PE.   -continue to monitor, improved.

## 2020-02-12 NOTE — PROGRESS NOTE ADULT - PROBLEM SELECTOR PLAN 7
DVT: heparin gtt   Diet: regular   Dispo: pending DVT: heparin gtt   Diet: regular   Dispo: home with no skilled PT needs

## 2020-02-12 NOTE — PROGRESS NOTE ADULT - PROBLEM SELECTOR PLAN 1
Found to have bilateral segmental and subsegmental pulmonary emboli, unprovoked. Denied immobilization, recent travel, or surgeries. Elevated D-dimer and troponinemia with evidence of right heart strain on ECG. repeat troponins downtrending.   -cw heparin gtt  -DVT study and TTE Found to have bilateral segmental and subsegmental pulmonary emboli, unprovoked. Denied immobilization, recent travel, or surgeries. Elevated D-dimer and troponinemia with evidence of right heart strain on ECG. repeat troponins downtrending.   -TTE 2/12 showing normal LV function, dilated RV and decreased RV systolic function compared to TTE 3/19, moderate pHTN.   -cw heparin gtt

## 2020-02-12 NOTE — CONSULT NOTE ADULT - ASSESSMENT
*** NOTE INCOMPLETE *** Patient is a 49 year old male with no significant past medical history presented to ED for chest pain and shortness of breath, found to have PE. Hematology consulted for leukocytosis.    # R/o malignancy  - Leukocytosis with lymphocyte predominance, but ALC still below 5k  - On CT chest, he had enlarged 1.6 x 1.4 and 2.0x1.7 lymph nodes, unclear significance  - Smear reviewed: anisocytosis, few nucleated rbcs, atypical lymphocytes, several monocytes, multiple target cells, occasional tear drop cells  - Will send for peripheral blood flow cytometry (form and green top tube given to nurse)  -  Will plan to perform BM bx tomorrow  - Can transition to DOAC for AC  - Outpatient referral to [facility] [place], [place] to discuss BM biopsy and to have hypercoagulable workup done, if workup is negative for malignancy.    Shahbaz Manzano, PGY4  Hematology-Oncology Fellow  Pager: [phone] / [phone] Patient is a 49 year old male with no significant past medical history presented to ED for chest pain and shortness of breath, found to have PE. Hematology consulted for leukocytosis.    # R/o malignancy  - Leukocytosis with lymphocyte predominance, but ALC still below 5k  - On CT chest, he had enlarged 1.6 x 1.4 and 2.0x1.7 lymph nodes, unclear significance  - Smear reviewed: anisocytosis, few nucleated rbcs, atypical lymphocytes, several monocytes, multiple target cells, occasional tear drop cells  - Will send for peripheral blood flow cytometry (form and green top tube given to nurse)  - Will plan to perform BM bx tomorrow  - Can transition to DOAC for AC  - Outpatient referral to [facility] [place] to discuss BM biopsy and to have hypercoagulable workup done, if workup is negative for malignancy.    Shahbaz Manzano, PGY4  Hematology-Oncology Fellow  Pager: [phone] / [phone]

## 2020-02-12 NOTE — PROGRESS NOTE ADULT - PROBLEM SELECTOR PLAN 5
Hgb of 12.6, baseline ~14. Microcytic. No reported bleeding events. will monitor now that on heparin gtt. iron studies normal.

## 2020-02-12 NOTE — PROGRESS NOTE ADULT - PROBLEM SELECTOR PLAN 4
Noted to have enlarged heart on CXR and CTA 2/10. No known history of CHF. Exam findings of bilateral pitting edema. Pro-BNP elevated to 600s.   -follow up TTE  -follow up serum ACE Noted to have enlarged heart on CXR and CTA 2/10. No known history of CHF. Exam findings of bilateral pitting edema. Pro-BNP elevated to 600s.   -LE edema appears improved.   -TTE 2/12 showing normal LV function, dilated RV and decreased RV systolic function compared to TTE 3/19, moderate pHTN.   -follow up serum ACE

## 2020-02-12 NOTE — PROGRESS NOTE ADULT - PROBLEM SELECTOR PLAN 3
WBC in teens with presence of smudge cells during prior admission in March. On this admission with leukocytosis to 17. CXR 2/10 with findings of Redemonstrated slightly blunted right CP angle compatible with a chronic small pleural reaction. Sharp appearing left CP angle. Hazy ill-defined increased right perihilar markings indeterminate for asymmetric edema or perihilar infection/pneumonia in the proper clinical context.   -?infectious vs. malignancy. at this point low suspicion for infectious cause. Afebrile, leukocytosis is chronic since prior admission.  -RVP negative  -LDH elevated at 400s, uric acid within normal limits. WBC in teens with presence of smudge cells during prior admission in March. On this admission with leukocytosis to 17. CXR 2/10 with findings of Redemonstrated slightly blunted right CP angle compatible with a chronic small pleural reaction. Sharp appearing left CP angle. Hazy ill-defined increased right perihilar markings indeterminate for asymmetric edema or perihilar infection/pneumonia in the proper clinical context.   -?infectious vs. malignancy. at this point low suspicion for infectious cause. Afebrile, leukocytosis is chronic since prior admission.  -RVP negative  -LDH elevated at 400s, uric acid within normal limits.  -follow up Heme recs

## 2020-02-12 NOTE — CONSULT NOTE ADULT - SUBJECTIVE AND OBJECTIVE BOX
HPI:  Patient is a 49 year old male with no significant past medical history presented to ED for chest pain and shortness of breath x 1 day. Per patient, he was on his usual state of health until Sunday night when he started experiencing an acute onset of shortness of breath associated with pleuritic chest pain for which he presented to the ED. On arrival, he underwent a CTA of chest which was remarkable for bilateral segmental and subsegmental pulmonary emboli, new from the prior exam, patchy bilateral opacities, new from the prior, of uncertain etiology. He was started on heparin drip. Hematology was consulted for leukocytosis.     Today, patient denies fever, chills, nausea, vomiting, diarrhea, abdominal pain. He denies immobility or recent travel. No family hx of clotting disorders. Denies weight loss.     Allergies    No Known Allergies    Intolerances    MEDICATIONS  (STANDING):  heparin  Infusion.  Unit(s)/Hr (16 mL/Hr) IV Continuous <Continuous>  influenza   Vaccine 0.5 milliLiter(s) IntraMuscular once    MEDICATIONS  (PRN):  heparin  Injectable 7000 Unit(s) IV Push every 6 hours PRN For aPTT less than 40  heparin  Injectable 3500 Unit(s) IV Push every 6 hours PRN For aPTT between 40 - 57      PAST MEDICAL & SURGICAL HISTORY:  No pertinent past medical history  S/P wrist surgery: Left wrist      FAMILY HISTORY:  Family history of cancer in mother: Unclear what type of cancer      SOCIAL HISTORY: No EtOH, no tobacco    REVIEW OF SYSTEMS: per HPI    T(F): 97.7 (02-12-20 @ 12:49), Max: 99 (02-11-20 @ 21:27)  HR: 92 (02-12-20 @ 12:49)  BP: 126/68 (02-12-20 @ 12:49)  RR: 18 (02-12-20 @ 12:49)  SpO2: 94% (02-12-20 @ 12:49)  Wt(kg): --    GENERAL: NAD, well-developed  HEAD:  Atraumatic, Normocephalic  EYES: EOMI, PERRLA, conjunctiva and sclera clear  NECK: Supple, No JVD  CHEST/LUNG: Clear to auscultation bilaterally; No wheeze  HEART: Regular rate and rhythm; No murmurs, rubs, or gallops  ABDOMEN: Soft, Nontender, Nondistended; Bowel sounds present  EXTREMITIES:  2+ Peripheral Pulses, No clubbing, cyanosis, or edema  NEUROLOGY: non-focal  SKIN: No rashes or lesions                          12.5   13.00 )-----------( 299      ( 12 Feb 2020 06:00 )             35.1       02-12    138  |  104  |  10  ----------------------------<  102<H>  4.0   |  24  |  1.16    Ca    8.8      12 Feb 2020 06:00  Phos  3.9     02-12  Mg     2.1     02-12        Magnesium, Serum: 2.1 mg/dL (02-12 @ 06:00)  Phosphorus Level, Serum: 3.9 mg/dL (02-12 @ 06:00)

## 2020-02-12 NOTE — CONSULT NOTE ADULT - ATTENDING COMMENTS
Patient CAT scan reviewed with radiologist.  There are small hilar nodes that are suspicious for lymphoma.  His CBC is abnormal chronically and suggests a lymphoproliferative disease.  His smear shows a marrow replacement process (nucleated RBCs and very large platelets).  He likely has a chronic lymphoma.  Will obtain peripheral flow cytometry, and will obtain bone marrow biopsy as inpatient or outpatient.  Continue to treat the PE for now, and will need outpatient hypercoagulable evaluation.

## 2020-02-13 ENCOUNTER — RESULT REVIEW (OUTPATIENT)
Age: 50
End: 2020-02-13

## 2020-02-13 LAB
ANION GAP SERPL CALC-SCNC: 11 MMO/L — SIGNIFICANT CHANGE UP (ref 7–14)
APTT BLD: 94.5 SEC — HIGH (ref 27.5–36.3)
BUN SERPL-MCNC: 10 MG/DL — SIGNIFICANT CHANGE UP (ref 7–23)
CALCIUM SERPL-MCNC: 9.1 MG/DL — SIGNIFICANT CHANGE UP (ref 8.4–10.5)
CHLORIDE SERPL-SCNC: 105 MMOL/L — SIGNIFICANT CHANGE UP (ref 98–107)
CO2 SERPL-SCNC: 24 MMOL/L — SIGNIFICANT CHANGE UP (ref 22–31)
CREAT SERPL-MCNC: 1.17 MG/DL — SIGNIFICANT CHANGE UP (ref 0.5–1.3)
GLUCOSE SERPL-MCNC: 78 MG/DL — SIGNIFICANT CHANGE UP (ref 70–99)
HCT VFR BLD CALC: 36.9 % — LOW (ref 39–50)
HGB BLD-MCNC: 12.8 G/DL — LOW (ref 13–17)
MAGNESIUM SERPL-MCNC: 2.2 MG/DL — SIGNIFICANT CHANGE UP (ref 1.6–2.6)
MCHC RBC-ENTMCNC: 28.9 PG — SIGNIFICANT CHANGE UP (ref 27–34)
MCHC RBC-ENTMCNC: 34.7 % — SIGNIFICANT CHANGE UP (ref 32–36)
MCV RBC AUTO: 83.3 FL — SIGNIFICANT CHANGE UP (ref 80–100)
NRBC # FLD: 0.41 K/UL — SIGNIFICANT CHANGE UP (ref 0–0)
NRBC FLD-RTO: 3.3 — SIGNIFICANT CHANGE UP
PHOSPHATE SERPL-MCNC: 3.8 MG/DL — SIGNIFICANT CHANGE UP (ref 2.5–4.5)
PLATELET # BLD AUTO: 330 K/UL — SIGNIFICANT CHANGE UP (ref 150–400)
PMV BLD: 10.3 FL — SIGNIFICANT CHANGE UP (ref 7–13)
POTASSIUM SERPL-MCNC: 4.3 MMOL/L — SIGNIFICANT CHANGE UP (ref 3.5–5.3)
POTASSIUM SERPL-SCNC: 4.3 MMOL/L — SIGNIFICANT CHANGE UP (ref 3.5–5.3)
RBC # BLD: 4.43 M/UL — SIGNIFICANT CHANGE UP (ref 4.2–5.8)
RBC # FLD: 20.4 % — HIGH (ref 10.3–14.5)
SODIUM SERPL-SCNC: 140 MMOL/L — SIGNIFICANT CHANGE UP (ref 135–145)
WBC # BLD: 12.46 K/UL — HIGH (ref 3.8–10.5)
WBC # FLD AUTO: 12.46 K/UL — HIGH (ref 3.8–10.5)

## 2020-02-13 PROCEDURE — 88189 FLOWCYTOMETRY/READ 16 & >: CPT | Mod: 59

## 2020-02-13 PROCEDURE — 88305 TISSUE EXAM BY PATHOLOGIST: CPT | Mod: 26

## 2020-02-13 PROCEDURE — 88367 INSITU HYBRIDIZATION AUTO: CPT | Mod: 26

## 2020-02-13 PROCEDURE — 85097 BONE MARROW INTERPRETATION: CPT

## 2020-02-13 PROCEDURE — 88365 INSITU HYBRIDIZATION (FISH): CPT | Mod: 26,59

## 2020-02-13 PROCEDURE — 93970 EXTREMITY STUDY: CPT | Mod: 26

## 2020-02-13 PROCEDURE — 99232 SBSQ HOSP IP/OBS MODERATE 35: CPT | Mod: GC

## 2020-02-13 PROCEDURE — 88342 IMHCHEM/IMCYTCHM 1ST ANTB: CPT | Mod: 26,59

## 2020-02-13 PROCEDURE — 88341 IMHCHEM/IMCYTCHM EA ADD ANTB: CPT | Mod: 26,59

## 2020-02-13 PROCEDURE — 88364 INSITU HYBRIDIZATION (FISH): CPT | Mod: 26

## 2020-02-13 PROCEDURE — 88360 TUMOR IMMUNOHISTOCHEM/MANUAL: CPT | Mod: 26

## 2020-02-13 PROCEDURE — 99233 SBSQ HOSP IP/OBS HIGH 50: CPT | Mod: GC

## 2020-02-13 PROCEDURE — 88313 SPECIAL STAINS GROUP 2: CPT | Mod: 26

## 2020-02-13 RX ORDER — ACETAMINOPHEN 500 MG
650 TABLET ORAL EVERY 6 HOURS
Refills: 0 | Status: DISCONTINUED | OUTPATIENT
Start: 2020-02-13 | End: 2020-02-14

## 2020-02-13 RX ORDER — APIXABAN 2.5 MG/1
10 TABLET, FILM COATED ORAL EVERY 12 HOURS
Refills: 0 | Status: DISCONTINUED | OUTPATIENT
Start: 2020-02-13 | End: 2020-02-13

## 2020-02-13 RX ADMIN — Medication 650 MILLIGRAM(S): at 15:16

## 2020-02-13 NOTE — PROGRESS NOTE ADULT - PROBLEM SELECTOR PLAN 3
Noted to have hilar LAD on CTA, new from prior CTA from 3/2019. Denies hemoptysis, weight loss, fever, chills, or night sweats. Is from [place] but emigrated 20 years ago and no recent travel. Low concern for infectious etiology. In setting of unprovoked PE and leukocytosis with smudge cells, suspect underlying malignancy.   -pulm consulted for possible bx, deferring at this time in light of acute PE. will need follow up CT scan in 4-6 weeks if persistent infiltrate in lung parenchyma, may need biopsy.

## 2020-02-13 NOTE — PROCEDURE NOTE - NSPOSTCAREGUIDE_GEN_A_CORE
Verbal/written post procedure instructions were given to patient/caregiver/Patient must lay on back for a minimum of 30 minutes. Do not wash procedure site for 24 hours. Can give Tylenol as needed

## 2020-02-13 NOTE — PROGRESS NOTE ADULT - PROBLEM SELECTOR PLAN 2
WBC in teens with presence of smudge cells during prior admission in March. On this admission with leukocytosis to 17. CXR 2/10 with findings of Redemonstrated slightly blunted right CP angle compatible with a chronic small pleural reaction. Sharp appearing left CP angle. Hazy ill-defined increased right perihilar markings indeterminate for asymmetric edema or perihilar infection/pneumonia in the proper clinical context.   -?infectious vs. malignancy. at this point low suspicion for infectious cause. Afebrile, leukocytosis is chronic since prior admission.  -RVP negative  -LDH elevated at 400s, uric acid within normal limits.  -Heme recs appreciated. Plan for BM biopsy 2/13.    -follow up peripheral blood flow cytometry   -transitioning to Eliquis

## 2020-02-13 NOTE — PROGRESS NOTE ADULT - SUBJECTIVE AND OBJECTIVE BOX
PROGRESS NOTE:   Authored by Nikki Kimura, MD, Pager [phone] [facility], [id number] [facility]     Patient is a 49y old  Male who presents with a chief complaint of chest pain, SOB (12 Feb 2020 14:45)      SUBJECTIVE / OVERNIGHT EVENTS:    ADDITIONAL REVIEW OF SYSTEMS:    MEDICATIONS  (STANDING):  apixaban 10 milliGRAM(s) Oral every 12 hours  influenza   Vaccine 0.5 milliLiter(s) IntraMuscular once    MEDICATIONS  (PRN):      CAPILLARY BLOOD GLUCOSE        I&O's Summary      PHYSICAL EXAM:  Vital Signs Last 24 Hrs  T(C): 36.5 (13 Feb 2020 04:15), Max: 37.3 (12 Feb 2020 20:15)  T(F): 97.7 (13 Feb 2020 04:15), Max: 99.1 (12 Feb 2020 20:15)  HR: 89 (13 Feb 2020 04:15) (89 - 106)  BP: 104/68 (13 Feb 2020 04:15) (104/68 - 137/86)  BP(mean): --  RR: 18 (13 Feb 2020 04:15) (16 - 18)  SpO2: 98% (13 Feb 2020 04:15) (94% - 98%)    GENERAL: middle aged male sitting in bed comfortably  ENT: Moist mucous membranes  NECK: Supple, No JVD  CHEST/LUNG: Clear to auscultation bilaterally, on 2L nasal cannula   HEART: Regular rate and rhythm; No murmurs, rubs, or gallops  ABDOMEN: obese, bowel sounds present; Soft, Nontender, non-distended   EXTREMITIES: no edema. no asymmetry or calf tenderness   NERVOUS SYSTEM:  Alert & Oriented X3, speech clear. No deficits   MSK: FROM all 4 extremities, full and equal strength  SKIN: No rashes or lesions    LABS:                        12.5   13.00 )-----------( 299      ( 12 Feb 2020 06:00 )             35.1     02-12    138  |  104  |  10  ----------------------------<  102<H>  4.0   |  24  |  1.16    Ca    8.8      12 Feb 2020 06:00  Phos  3.9     02-12  Mg     2.1     02-12      PTT - ( 12 Feb 2020 05:50 )  PTT:88.0 SEC            RADIOLOGY & ADDITIONAL TESTS:  Results Reviewed:   Imaging Personally Reviewed:  Electrocardiogram Personally Reviewed:    COORDINATION OF CARE:  Care Discussed with Consultants/Other Providers [Y/N]:  Prior or Outpatient Records Reviewed [Y/N]: PROGRESS NOTE:   Authored by Nikki Kimura, MD, Pager [phone] [facility], [id number] [facility]     Patient is a 49y old  Male who presents with a chief complaint of chest pain, SOB (12 Feb 2020 14:45)      SUBJECTIVE / OVERNIGHT EVENTS: No acute events overnight. Pt seen and examined at bedside. He denies chest pain, shortness of breath, palpitations, fever, chills, nausea, or vomiting.     ADDITIONAL REVIEW OF SYSTEMS:    MEDICATIONS  (STANDING):  apixaban 10 milliGRAM(s) Oral every 12 hours  influenza   Vaccine 0.5 milliLiter(s) IntraMuscular once    MEDICATIONS  (PRN):      CAPILLARY BLOOD GLUCOSE        I&O's Summary      PHYSICAL EXAM:  Vital Signs Last 24 Hrs  T(C): 36.5 (13 Feb 2020 04:15), Max: 37.3 (12 Feb 2020 20:15)  T(F): 97.7 (13 Feb 2020 04:15), Max: 99.1 (12 Feb 2020 20:15)  HR: 89 (13 Feb 2020 04:15) (89 - 106)  BP: 104/68 (13 Feb 2020 04:15) (104/68 - 137/86)  BP(mean): --  RR: 18 (13 Feb 2020 04:15) (16 - 18)  SpO2: 98% (13 Feb 2020 04:15) (94% - 98%)    GENERAL: middle aged male sitting in bed comfortably  ENT: Moist mucous membranes  NECK: Supple, No JVD  CHEST/LUNG: Clear to auscultation bilaterally  HEART: Regular rate and rhythm; No murmurs, rubs, or gallops  ABDOMEN: obese, bowel sounds present; Soft, Nontender, non-distended   EXTREMITIES: no edema. no asymmetry or calf tenderness   NERVOUS SYSTEM:  Alert & Oriented X3, speech clear. No deficits   MSK: FROM all 4 extremities, full and equal strength  SKIN: No rashes or lesions    LABS:                        12.5   13.00 )-----------( 299      ( 12 Feb 2020 06:00 )             35.1     02-12    138  |  104  |  10  ----------------------------<  102<H>  4.0   |  24  |  1.16    Ca    8.8      12 Feb 2020 06:00  Phos  3.9     02-12  Mg     2.1     02-12      PTT - ( 12 Feb 2020 05:50 )  PTT:88.0 SEC            RADIOLOGY & ADDITIONAL TESTS:  Results Reviewed:   Imaging Personally Reviewed:  Electrocardiogram Personally Reviewed:    COORDINATION OF CARE:  Care Discussed with Consultants/Other Providers [Y/N]:  Prior or Outpatient Records Reviewed [Y/N]: PROGRESS NOTE:   Authored by Nikki Kimura, MD, Pager [phone] [facility], [id number] [facility]     Patient is a 49y old  Male who presents with a chief complaint of chest pain, SOB (12 Feb 2020 14:45)      SUBJECTIVE / OVERNIGHT EVENTS: Overnight pt desatted to 80% on RA while sleeping, noted to have snoring as well. Placed back on 2L NC. Pt seen and examined at bedside. He denies chest pain, shortness of breath, palpitations, fever, chills, nausea, or vomiting.     ADDITIONAL REVIEW OF SYSTEMS:    MEDICATIONS  (STANDING):  apixaban 10 milliGRAM(s) Oral every 12 hours  influenza   Vaccine 0.5 milliLiter(s) IntraMuscular once    MEDICATIONS  (PRN):      CAPILLARY BLOOD GLUCOSE        I&O's Summary      PHYSICAL EXAM:  Vital Signs Last 24 Hrs  T(C): 36.5 (13 Feb 2020 04:15), Max: 37.3 (12 Feb 2020 20:15)  T(F): 97.7 (13 Feb 2020 04:15), Max: 99.1 (12 Feb 2020 20:15)  HR: 89 (13 Feb 2020 04:15) (89 - 106)  BP: 104/68 (13 Feb 2020 04:15) (104/68 - 137/86)  BP(mean): --  RR: 18 (13 Feb 2020 04:15) (16 - 18)  SpO2: 98% (13 Feb 2020 04:15) (94% - 98%)    GENERAL: middle aged male sitting in bed comfortably  ENT: Moist mucous membranes  NECK: Supple, No JVD  CHEST/LUNG: Clear to auscultation bilaterally  HEART: Regular rate and rhythm; No murmurs, rubs, or gallops  ABDOMEN: obese, bowel sounds present; Soft, Nontender, non-distended   EXTREMITIES: no edema. no asymmetry or calf tenderness   NERVOUS SYSTEM:  Alert & Oriented X3, speech clear. No deficits   MSK: FROM all 4 extremities, full and equal strength  SKIN: No rashes or lesions    LABS:                        12.5   13.00 )-----------( 299      ( 12 Feb 2020 06:00 )             35.1     02-12    138  |  104  |  10  ----------------------------<  102<H>  4.0   |  24  |  1.16    Ca    8.8      12 Feb 2020 06:00  Phos  3.9     02-12  Mg     2.1     02-12      PTT - ( 12 Feb 2020 05:50 )  PTT:88.0 SEC            RADIOLOGY & ADDITIONAL TESTS:  Results Reviewed:   Imaging Personally Reviewed:  Electrocardiogram Personally Reviewed:    COORDINATION OF CARE:  Care Discussed with Consultants/Other Providers [Y/N]:  Prior or Outpatient Records Reviewed [Y/N]: PROGRESS NOTE:   Authored by Nikki Kimura, MD, Pager [phone] [facility], [id number] [facility]     Patient is a 49y old  Male who presents with a chief complaint of chest pain, SOB (12 Feb 2020 14:45)      SUBJECTIVE / OVERNIGHT EVENTS: Overnight pt desatted to 80% on RA while sleeping, noted to have snoring as well. Placed back on 2L NC. Pt seen and examined at bedside. He denies chest pain, shortness of breath, palpitations, fever, chills, nausea, or vomiting. Walked with patient this morning and O2 sats went from 97% to 91%, no symptoms.     ADDITIONAL REVIEW OF SYSTEMS:    MEDICATIONS  (STANDING):  apixaban 10 milliGRAM(s) Oral every 12 hours  influenza   Vaccine 0.5 milliLiter(s) IntraMuscular once    MEDICATIONS  (PRN):      CAPILLARY BLOOD GLUCOSE        I&O's Summary      PHYSICAL EXAM:  Vital Signs Last 24 Hrs  T(C): 36.5 (13 Feb 2020 04:15), Max: 37.3 (12 Feb 2020 20:15)  T(F): 97.7 (13 Feb 2020 04:15), Max: 99.1 (12 Feb 2020 20:15)  HR: 89 (13 Feb 2020 04:15) (89 - 106)  BP: 104/68 (13 Feb 2020 04:15) (104/68 - 137/86)  BP(mean): --  RR: 18 (13 Feb 2020 04:15) (16 - 18)  SpO2: 98% (13 Feb 2020 04:15) (94% - 98%)    GENERAL: middle aged male sitting in bed comfortably  ENT: Moist mucous membranes  NECK: Supple, No JVD  CHEST/LUNG: Clear to auscultation bilaterally  HEART: Regular rate and rhythm; No murmurs, rubs, or gallops  ABDOMEN: obese, bowel sounds present; Soft, Nontender, non-distended   EXTREMITIES: no edema. no asymmetry or calf tenderness   NERVOUS SYSTEM:  Alert & Oriented X3, speech clear. No deficits   MSK: FROM all 4 extremities, full and equal strength  SKIN: No rashes or lesions    LABS:                        12.5   13.00 )-----------( 299      ( 12 Feb 2020 06:00 )             35.1     02-12    138  |  104  |  10  ----------------------------<  102<H>  4.0   |  24  |  1.16    Ca    8.8      12 Feb 2020 06:00  Phos  3.9     02-12  Mg     2.1     02-12      PTT - ( 12 Feb 2020 05:50 )  PTT:88.0 SEC            RADIOLOGY & ADDITIONAL TESTS:  Results Reviewed:   Imaging Personally Reviewed:  Electrocardiogram Personally Reviewed:    COORDINATION OF CARE:  Care Discussed with Consultants/Other Providers [Y/N]:  Prior or Outpatient Records Reviewed [Y/N]:

## 2020-02-13 NOTE — PROGRESS NOTE ADULT - PROBLEM SELECTOR PLAN 1
Found to have bilateral segmental and subsegmental pulmonary emboli, unprovoked. Denied immobilization, recent travel, or surgeries. Elevated D-dimer and troponinemia with evidence of right heart strain on ECG. repeat troponins downtrending.   -TTE 2/12 showing normal LV function, dilated RV and decreased RV systolic function compared to TTE 3/19, moderate pHTN.   -s/p heparin gtt, transitioned to Eliquis

## 2020-02-13 NOTE — PROGRESS NOTE ADULT - PROBLEM SELECTOR PLAN 4
Noted to have enlarged heart on CXR and CTA 2/10. No known history of CHF. Exam findings of bilateral pitting edema. Pro-BNP elevated to 600s.   -LE edema appears improved.   -TTE 2/12 showing normal LV function, dilated RV and decreased RV systolic function compared to TTE 3/19, moderate pHTN.   -serum ACE level normal

## 2020-02-13 NOTE — PROGRESS NOTE ADULT - SUBJECTIVE AND OBJECTIVE BOX
Interval Events:  Feels well this morning  Saturating well on room air  TTE shows moderate pHTN and increased RV size  Tentatively planned for bone marrow biopsy today    REVIEW OF SYSTEMS:  [x] All other systems negative  [ ] Unable to assess ROS because ________    OBJECTIVE:  ICU Vital Signs Last 24 Hrs  T(C): 36.5 (13 Feb 2020 04:15), Max: 37.3 (12 Feb 2020 20:15)  T(F): 97.7 (13 Feb 2020 04:15), Max: 99.1 (12 Feb 2020 20:15)  HR: 89 (13 Feb 2020 04:15) (89 - 106)  BP: 104/68 (13 Feb 2020 04:15) (104/68 - 137/86)  BP(mean): --  ABP: --  ABP(mean): --  RR: 18 (13 Feb 2020 04:15) (16 - 18)  SpO2: 98% (13 Feb 2020 04:15) (86% - 98%)        CAPILLARY BLOOD GLUCOSE          PHYSICAL EXAM:  General: No apparent distress  HEENT: NC/AT  Neck: Supple  Respiratory: CTAB, no wheezing or crackles, good air entry  Cardiovascular: RRR, no murmur, 1-2+ LE edema  Abdomen: Soft, nontender, nondistended  Extremities: Warm  Skin: Intact  Neurological: A&Ox3, no focal deficits  Psychiatry: Normal mood and affect    HOSPITAL MEDICATIONS:  apixaban 10 milliGRAM(s) Oral every 12 hours  heparin  Injectable 5000 Unit(s) SubCutaneous once                      influenza   Vaccine 0.5 milliLiter(s) IntraMuscular once      lidocaine 2% Injectable 20 milliLiter(s) Local Injection once      LABS:                        12.8   12.46 )-----------( 330      ( 13 Feb 2020 05:40 )             36.9     Hgb Trend: 12.8<--, 12.5<--, 12.6<--, 12.5<--, 11.9<--  02-13    140  |  105  |  10  ----------------------------<  78  4.3   |  24  |  1.17    Ca    9.1      13 Feb 2020 05:40  Phos  3.8     02-13  Mg     2.2     02-13      Creatinine Trend: 1.17<--, 1.16<--, 1.09<--, 1.23<--  PTT - ( 13 Feb 2020 05:40 )  PTT:94.5 SEC          MICROBIOLOGY:     RADIOLOGY:  [ ] Reviewed and interpreted by me    ASSESSMENT AND RECOMMENDATION:    49 M, no medical history, presented with chest pain and dyspnea. Found to have bilateral segmental and subsegmental PE as well as multiple pulmonary opacities and LAD. Unclear etiology of this unprovoked submassive PE (given elevated troponin levels). Although there is concern for a lymphoproliferative process.    Submassive PE  TTE shows moderately elevated pulmonary pressures  On heparin gtt  No need for supplemental oxygen at this time  Would check LE DVT study as well    Persistent leukocytosis  Tentatively planned for BMBx today    LAD, Nodules  Given acute PE will defer bronchoscopy at this time  Can repeat chest CT in 4-6 weeks   May need bronchoscopy depending on repeat imaging findings  Please have patient follow up with us in clinic located at [address] [place] [address], [address], [place] | [phone]    Jono Gregory MD  Pulmonary and Critical Care Fellow  [phone]

## 2020-02-13 NOTE — PROGRESS NOTE ADULT - PROBLEM SELECTOR PLAN 6
SCr at 1.23, in 3/2019 was 1.03. Likely in setting of poor forward flow 2/2 to PE.   -continue to monitor, improved.

## 2020-02-14 ENCOUNTER — TRANSCRIPTION ENCOUNTER (OUTPATIENT)
Age: 50
End: 2020-02-14

## 2020-02-14 VITALS
SYSTOLIC BLOOD PRESSURE: 105 MMHG | HEART RATE: 85 BPM | TEMPERATURE: 99 F | DIASTOLIC BLOOD PRESSURE: 64 MMHG | OXYGEN SATURATION: 97 % | RESPIRATION RATE: 17 BRPM

## 2020-02-14 LAB
ANION GAP SERPL CALC-SCNC: 10 MMO/L — SIGNIFICANT CHANGE UP (ref 7–14)
BASOPHILS # BLD AUTO: 0.09 K/UL — SIGNIFICANT CHANGE UP (ref 0–0.2)
BASOPHILS NFR BLD AUTO: 0.9 % — SIGNIFICANT CHANGE UP (ref 0–2)
BUN SERPL-MCNC: 13 MG/DL — SIGNIFICANT CHANGE UP (ref 7–23)
CALCIUM SERPL-MCNC: 9.4 MG/DL — SIGNIFICANT CHANGE UP (ref 8.4–10.5)
CHLORIDE SERPL-SCNC: 107 MMOL/L — SIGNIFICANT CHANGE UP (ref 98–107)
CO2 SERPL-SCNC: 21 MMOL/L — LOW (ref 22–31)
CREAT SERPL-MCNC: 1.06 MG/DL — SIGNIFICANT CHANGE UP (ref 0.5–1.3)
EOSINOPHIL # BLD AUTO: 0.71 K/UL — HIGH (ref 0–0.5)
EOSINOPHIL NFR BLD AUTO: 6.8 % — HIGH (ref 0–6)
GLUCOSE SERPL-MCNC: 90 MG/DL — SIGNIFICANT CHANGE UP (ref 70–99)
HCT VFR BLD CALC: 37.7 % — LOW (ref 39–50)
HGB A MFR BLD: 0 % — LOW
HGB A2 MFR BLD: 4.2 % — HIGH (ref 2.4–3.5)
HGB BLD-MCNC: 13.4 G/DL — SIGNIFICANT CHANGE UP (ref 13–17)
HGB C MFR BLD: 47.5 % — HIGH (ref 0–0)
HGB ELECT COMMENT: SIGNIFICANT CHANGE UP
HGB F MFR BLD: < 1 % — SIGNIFICANT CHANGE UP (ref 0–1.5)
HGB S BLD QL: POSITIVE — SIGNIFICANT CHANGE UP
HGB S MFR BLD: 47.8 % — HIGH (ref 0–0)
IMM GRANULOCYTES NFR BLD AUTO: 0.7 % — SIGNIFICANT CHANGE UP (ref 0–1.5)
LYMPHOCYTES # BLD AUTO: 2.41 K/UL — SIGNIFICANT CHANGE UP (ref 1–3.3)
LYMPHOCYTES # BLD AUTO: 23.2 % — SIGNIFICANT CHANGE UP (ref 13–44)
MAGNESIUM SERPL-MCNC: 2.2 MG/DL — SIGNIFICANT CHANGE UP (ref 1.6–2.6)
MCHC RBC-ENTMCNC: 28.9 PG — SIGNIFICANT CHANGE UP (ref 27–34)
MCHC RBC-ENTMCNC: 35.5 % — SIGNIFICANT CHANGE UP (ref 32–36)
MCV RBC AUTO: 81.3 FL — SIGNIFICANT CHANGE UP (ref 80–100)
MONOCYTES # BLD AUTO: 1.33 K/UL — HIGH (ref 0–0.9)
MONOCYTES NFR BLD AUTO: 12.8 % — SIGNIFICANT CHANGE UP (ref 2–14)
NEUTROPHILS # BLD AUTO: 5.78 K/UL — SIGNIFICANT CHANGE UP (ref 1.8–7.4)
NEUTROPHILS NFR BLD AUTO: 55.6 % — SIGNIFICANT CHANGE UP (ref 43–77)
NRBC # FLD: 0.25 K/UL — SIGNIFICANT CHANGE UP (ref 0–0)
NRBC FLD-RTO: 2.4 — SIGNIFICANT CHANGE UP
PHOSPHATE SERPL-MCNC: 3.6 MG/DL — SIGNIFICANT CHANGE UP (ref 2.5–4.5)
PLATELET # BLD AUTO: 314 K/UL — SIGNIFICANT CHANGE UP (ref 150–400)
PMV BLD: 9.9 FL — SIGNIFICANT CHANGE UP (ref 7–13)
POTASSIUM SERPL-MCNC: 4.4 MMOL/L — SIGNIFICANT CHANGE UP (ref 3.5–5.3)
POTASSIUM SERPL-SCNC: 4.4 MMOL/L — SIGNIFICANT CHANGE UP (ref 3.5–5.3)
RBC # BLD: 4.64 M/UL — SIGNIFICANT CHANGE UP (ref 4.2–5.8)
RBC # FLD: 20.4 % — HIGH (ref 10.3–14.5)
SODIUM SERPL-SCNC: 138 MMOL/L — SIGNIFICANT CHANGE UP (ref 135–145)
SOLUBILITY: POSITIVE — SIGNIFICANT CHANGE UP
WBC # BLD: 10.39 K/UL — SIGNIFICANT CHANGE UP (ref 3.8–10.5)
WBC # FLD AUTO: 10.39 K/UL — SIGNIFICANT CHANGE UP (ref 3.8–10.5)

## 2020-02-14 PROCEDURE — 99239 HOSP IP/OBS DSCHRG MGMT >30: CPT | Mod: GC

## 2020-02-14 PROCEDURE — 83020 HEMOGLOBIN ELECTROPHORESIS: CPT | Mod: 26

## 2020-02-14 RX ADMIN — Medication 650 MILLIGRAM(S): at 07:05

## 2020-02-14 RX ADMIN — Medication 650 MILLIGRAM(S): at 08:05

## 2020-02-14 NOTE — PROGRESS NOTE ADULT - SUBJECTIVE AND OBJECTIVE BOX
PROGRESS NOTE:   Authored by Nikki Kimura, MD, Pager [phone] [facility], [address] [facility]     Patient is a 49y old  Male who presents with a chief complaint of chest pain, SOB (13 Feb 2020 10:08)      SUBJECTIVE / OVERNIGHT EVENTS:    ADDITIONAL REVIEW OF SYSTEMS:    MEDICATIONS  (STANDING):  apixaban 10 milliGRAM(s) Oral every 12 hours  influenza   Vaccine 0.5 milliLiter(s) IntraMuscular once    MEDICATIONS  (PRN):  acetaminophen   Tablet .. 650 milliGRAM(s) Oral every 6 hours PRN Mild Pain (1 - 3), Moderate Pain (4 - 6)      CAPILLARY BLOOD GLUCOSE        I&O's Summary      PHYSICAL EXAM:  Vital Signs Last 24 Hrs  T(C): 37.1 (14 Feb 2020 05:32), Max: 37.1 (14 Feb 2020 05:32)  T(F): 98.8 (14 Feb 2020 05:32), Max: 98.8 (14 Feb 2020 05:32)  HR: 85 (14 Feb 2020 05:32) (85 - 103)  BP: 105/64 (14 Feb 2020 05:32) (105/64 - 123/70)  BP(mean): --  RR: 17 (14 Feb 2020 05:32) (17 - 18)  SpO2: 97% (14 Feb 2020 05:32) (96% - 97%)    GENERAL: middle aged male sitting in bed comfortably  ENT: Moist mucous membranes  NECK: Supple, No JVD  CHEST/LUNG: Clear to auscultation bilaterally  HEART: Regular rate and rhythm; No murmurs, rubs, or gallops  ABDOMEN: obese, bowel sounds present; Soft, Nontender, non-distended   EXTREMITIES: no edema. no asymmetry or calf tenderness   NERVOUS SYSTEM:  Alert & Oriented X3, speech clear. No deficits   MSK: FROM all 4 extremities, full and equal strength  SKIN: No rashes or lesions    LABS:                        12.8   12.46 )-----------( 330      ( 13 Feb 2020 05:40 )             36.9     02-13    140  |  105  |  10  ----------------------------<  78  4.3   |  24  |  1.17    Ca    9.1      13 Feb 2020 05:40  Phos  3.8     02-13  Mg     2.2     02-13      PTT - ( 13 Feb 2020 05:40 )  PTT:94.5 SEC            RADIOLOGY & ADDITIONAL TESTS:  < from: VA Duplex Ext Veins Lower Comp, Bilat. (02.13.20 @ 15:58) >  Summary/Impressions:  1.  No evidence of deep vein thrombosis in the right and  left lower extremities.  2.  No evidence of deep and superficial venous  insufficiency noted in the right and left lower  extremities.    < end of copied text > PROGRESS NOTE:   Authored by Nikki Kimura, MD, Pager [phone] [facility], [id number] [facility]     Patient is a 49y old  Male who presents with a chief complaint of chest pain, SOB (13 Feb 2020 10:08)      SUBJECTIVE / OVERNIGHT EVENTS: No acute events overnight. Pt seen and examined at bedside. He denies chest pain, shortness of breath, fever, chills, nausea, or vomiting. Breathing well on RA.     ADDITIONAL REVIEW OF SYSTEMS:    MEDICATIONS  (STANDING):  apixaban 10 milliGRAM(s) Oral every 12 hours  influenza   Vaccine 0.5 milliLiter(s) IntraMuscular once    MEDICATIONS  (PRN):  acetaminophen   Tablet .. 650 milliGRAM(s) Oral every 6 hours PRN Mild Pain (1 - 3), Moderate Pain (4 - 6)      CAPILLARY BLOOD GLUCOSE        I&O's Summary      PHYSICAL EXAM:  Vital Signs Last 24 Hrs  T(C): 37.1 (14 Feb 2020 05:32), Max: 37.1 (14 Feb 2020 05:32)  T(F): 98.8 (14 Feb 2020 05:32), Max: 98.8 (14 Feb 2020 05:32)  HR: 85 (14 Feb 2020 05:32) (85 - 103)  BP: 105/64 (14 Feb 2020 05:32) (105/64 - 123/70)  BP(mean): --  RR: 17 (14 Feb 2020 05:32) (17 - 18)  SpO2: 97% (14 Feb 2020 05:32) (96% - 97%)    GENERAL: middle aged male sitting in bed comfortably  ENT: Moist mucous membranes  NECK: Supple, No JVD  CHEST/LUNG: Clear to auscultation bilaterally  HEART: Regular rate and rhythm; No murmurs, rubs, or gallops  ABDOMEN: obese, bowel sounds present; Soft, Nontender, non-distended   EXTREMITIES: no edema. no asymmetry or calf tenderness   NERVOUS SYSTEM:  Alert & Oriented X3, speech clear. No deficits   MSK: FROM all 4 extremities, full and equal strength  SKIN: No rashes or lesions    LABS:                        12.8   12.46 )-----------( 330      ( 13 Feb 2020 05:40 )             36.9     02-13    140  |  105  |  10  ----------------------------<  78  4.3   |  24  |  1.17    Ca    9.1      13 Feb 2020 05:40  Phos  3.8     02-13  Mg     2.2     02-13      PTT - ( 13 Feb 2020 05:40 )  PTT:94.5 SEC            RADIOLOGY & ADDITIONAL TESTS:  < from: VA Duplex Ext Veins Lower Comp, Bilat. (02.13.20 @ 15:58) >  Summary/Impressions:  1.  No evidence of deep vein thrombosis in the right and  left lower extremities.  2.  No evidence of deep and superficial venous  insufficiency noted in the right and left lower  extremities.    < end of copied text >

## 2020-02-14 NOTE — DISCHARGE NOTE NURSING/CASE MANAGEMENT/SOCIAL WORK - NSDCFUADDAPPT_GEN_ALL_CORE_FT
Please follow up with your PCP, Hematology, and Pulmonary within 2 weeks of discharge.    1. For PCP, you are scheduled to be seen at [address] [place] [address] in -[facility] 1st Floor at 8:40 AM for financial counseling, followed by a medical visit with Dr. Nalini Carlton. Please arrive 20 minutes in advance, and bring 3 documents with you: 1) picture ID, 2) proof of address, 3) proof of income.   Alternatively, you may make an appointment with the [facility] at [address] [place] [address] at [phone].     2. Please make sure to schedule a visit to see a hematologist in order to be continued on your Eliquis. If you are unable to be seen at [facility], you need to find a hematologist at [facility] for follow up. Please obtain your medical records from [facility] in order to bring results with you.     3. Please call the Pulmonary clinic for an appointment at the number above, or arrange for follow up with a lung doctor at [facility].     **If you need a refill of Eliquis and cannot be seen by a provider call the [facility] group at [address] [facility] phone # [phone] to speak to a provider who can prescribe this medication to you.

## 2020-02-14 NOTE — PROGRESS NOTE ADULT - ATTENDING COMMENTS
Agree with above.    49 year old man with unprovoked sub massive PE doing well from a respiratory stand point. Continue AC will need re-imaging to assess pulmonary parenchyma in 4-6 weeks.  Planned for bone marrow biopsy to assess for possible lymphoma.
Transitioned to NOAC 2/13/20.   Stable off NC ambulating freely, no s/s of respiratory distress.   Given b/l hilar adenopathy, per Pulm will do Biopsy as outpatient.  R/o underlying malignancy s/p Bone marrow biopsy, f/u Hem/Onc correction as outpatient.   CM involvement, insurance application ongoing.   Medication prescription: 1 month supply AC available to patient VIVO.   Follow up in Medicine clinic 1-2 weeks post discharge.   Follow up Hem/Onc- 1-2 weeks post discharge.   Follow up Pulm 1-2 weeks post discharge for lung biopsy.   Provided info to [facility], IF patient is unable to make the above appt with Alvaro.  Discharge planning time: 34 min in coordinating d/c plan with team.
Transitioned to NOAC today.   Monitor for any signs symptoms of respiratory failure. Stable off NC.   Given b/l hilar adenopathy, per pulm will do Biopsy as outpatient.  No signs symptoms suggestive of underlying infection.   R/o underlying malignancy s/p Bone marrow biopsy today Hem/Onc following.   Will need outpatient following up at [facility] pending biopsy- possible hypercoagulable work up.   CM involvement, insurance application ongoing.   Dispo: 1-2 days pending Insurance plan, 1 month supply AC available to patient VIVO.   Needs outpatient f/u appt at [facility] for ongoing care given possible delay in insurance process.
Unprovoked PE, started therapeutic anticoagulation. Stable.   Follow up Echocardiogram.   Monitor for any signs symptoms of respiratory failure.   If worsening hypoxia- start HFNC, avoid BIPAP in the setting of PE.   Will consider CT for catheter guided thrombolysis if worsening hypoxia  Given b/l hilar adenopathy, Pulm eval in AM- ?Biopsy possible Sarcoidosis.   No signs symptoms suggestive of underlying infection.   Consider malignancy work up. Obtain peripheral smear- prior ? Smudge cells.  Oncology input.   Will consider switching to NOAC if no further inpatient work up planned.
Unprovoked PE, started therapeutic anticoagulation. Stable.   Follow up Echocardiogram.   Monitor for any signs symptoms of respiratory failure.   If worsening hypoxia- start HFNC, avoid BIPAP in the setting of PE.   Will consider CT for catheter guided thrombolysis if worsening hypoxia  Given b/l hilar adenopathy, Pulm eval in AM- ?Biopsy possible Sarcoidosis.   No signs symptoms suggestive of underlying infection.   Consider malignancy work up. Obtain peripheral smear- prior ? Smudge cells.  Hem/Onc input.   Will consider switching to NOAC if no further inpatient work up planned by jose?

## 2020-02-14 NOTE — PROGRESS NOTE ADULT - ASSESSMENT
49M with no prior medical history presented for chest pain and shortness of breath, found to have bilateral PE, hilar LAD. Also with history of leukocytosis with smudge cells known from prior admission 3/2019. Now on Eliquis and satting well on RA. DVT study negative. S/p BM bx 2/13.

## 2020-02-14 NOTE — DISCHARGE NOTE NURSING/CASE MANAGEMENT/SOCIAL WORK - PATIENT PORTAL LINK FT
You can access the FollowMyHealth Patient Portal offered by [facility] by registering at the following website: [email or web address]. By joining [employer]’s FollowMyHealth portal, you will also be able to view your health information using other applications (apps) compatible with our system.

## 2020-02-14 NOTE — PROGRESS NOTE ADULT - PROBLEM SELECTOR PLAN 3
Noted to have hilar LAD on CTA, new from prior CTA from 3/2019. Denies hemoptysis, weight loss, fever, chills, or night sweats. Is from [place] but emigrated 20 years ago and no recent travel. Low concern for infectious etiology. In setting of unprovoked PE and leukocytosis with smudge cells, suspect underlying malignancy.   -pulm consulted for possible bx, deferring at this time in light of acute PE. will need follow up CT scan in 4-6 weeks if persistent infiltrate in lung parenchyma, may need biopsy.  -outpatient followup

## 2020-02-14 NOTE — PROGRESS NOTE ADULT - NSHPATTENDINGPLANDISCUSS_GEN_ALL_CORE
Pulmonary fellow
Intern Nella Jones MD and Patient
Intern Nella Jones MD and Patient
Intern Nella Jones MD
Intern Nella Jones MD and Patient

## 2020-02-14 NOTE — PROGRESS NOTE ADULT - PROBLEM SELECTOR PLAN 2
WBC in teens with presence of smudge cells during prior admission in March. On this admission with leukocytosis to 17. CXR 2/10 with findings of Redemonstrated slightly blunted right CP angle compatible with a chronic small pleural reaction. Sharp appearing left CP angle. Hazy ill-defined increased right perihilar markings indeterminate for asymmetric edema or perihilar infection/pneumonia in the proper clinical context.   -?infectious vs. malignancy. at this point low suspicion for infectious cause. Afebrile, leukocytosis is chronic since prior admission.  -RVP negative  -LDH elevated at 400s, uric acid within normal limits.  -Heme recs appreciated. s/p BM biopsy 2/13, peripheral blood flow cytometry sent. Pt to follow up in [facility] [place] on discharge.   -transitioning to [facility] 2/13 WBC in teens with presence of smudge cells during prior admission in March. On this admission with leukocytosis to 17. CXR 2/10 with findings of Redemonstrated slightly blunted right CP angle compatible with a chronic small pleural reaction. Sharp appearing left CP angle. Hazy ill-defined increased right perihilar markings indeterminate for asymmetric edema or perihilar infection/pneumonia in the proper clinical context.   -?infectious vs. malignancy. at this point low suspicion for infectious cause. Afebrile, leukocytosis is chronic since prior admission.  -RVP negative  -LDH elevated at 400s, uric acid within normal limits.  -Heme recs appreciated. s/p BM biopsy 2/13, peripheral blood flow cytometry sent. Pt to follow up in [facility] [place] on discharge.

## 2020-02-14 NOTE — PROGRESS NOTE ADULT - PROBLEM SELECTOR PLAN 1
Found to have bilateral segmental and subsegmental pulmonary emboli, unprovoked. Denied immobilization, recent travel, or surgeries. Elevated D-dimer and troponinemia with evidence of right heart strain on ECG. repeat troponins downtrending.   -TTE 2/12 showing normal LV function, dilated RV and decreased RV systolic function compared to TTE 3/19, moderate pHTN.   -s/p heparin gtt, transitioned to Eliquis 2/13 Found to have bilateral segmental and subsegmental pulmonary emboli, unprovoked. Denied immobilization, recent travel, or surgeries. Elevated D-dimer and troponinemia with evidence of right heart strain on ECG. repeat troponins downtrending.   -TTE 2/12 showing normal LV function, dilated RV and decreased RV systolic function compared to TTE 3/19, moderate pHTN.   -s/p heparin gtt, transitioned to Eliquis 2/13  -LE DVT studies 2/13 negative for DVT in either extremity.

## 2020-02-18 LAB
TM INTERPRETATION: SIGNIFICANT CHANGE UP
TM INTERPRETATION: SIGNIFICANT CHANGE UP

## 2020-02-19 LAB
HEMATOPATHOLOGY REPORT: SIGNIFICANT CHANGE UP
HTLV I+II AB PATRN SER RIPA-IMP: SIGNIFICANT CHANGE UP

## 2020-02-28 ENCOUNTER — OUTPATIENT (OUTPATIENT)
Dept: OUTPATIENT SERVICES | Facility: HOSPITAL | Age: 50
LOS: 1 days | Discharge: HOME | End: 2020-02-28

## 2020-02-28 DIAGNOSIS — D72.829 ELEVATED WHITE BLOOD CELL COUNT, UNSPECIFIED: ICD-10-CM

## 2020-02-28 DIAGNOSIS — Z98.890 OTHER SPECIFIED POSTPROCEDURAL STATES: Chronic | ICD-10-CM

## 2020-03-03 ENCOUNTER — RESULT REVIEW (OUTPATIENT)
Age: 50
End: 2020-03-03

## 2020-03-03 ENCOUNTER — APPOINTMENT (OUTPATIENT)
Dept: HEMATOLOGY ONCOLOGY | Facility: CLINIC | Age: 50
End: 2020-03-03
Payer: MEDICAID

## 2020-03-03 VITALS
BODY MASS INDEX: 28.88 KG/M2 | TEMPERATURE: 98.4 F | HEART RATE: 107 BPM | WEIGHT: 201.72 LBS | OXYGEN SATURATION: 96 % | HEIGHT: 70.08 IN | SYSTOLIC BLOOD PRESSURE: 120 MMHG | DIASTOLIC BLOOD PRESSURE: 81 MMHG | RESPIRATION RATE: 18 BRPM

## 2020-03-03 LAB
ANISOCYTOSIS BLD QL: SLIGHT — SIGNIFICANT CHANGE UP
BASOPHILS # BLD AUTO: 0 K/UL — SIGNIFICANT CHANGE UP (ref 0–0.2)
BASOPHILS NFR BLD AUTO: 0 % — SIGNIFICANT CHANGE UP (ref 0–2)
EOSINOPHIL # BLD AUTO: 0.59 K/UL — HIGH (ref 0–0.5)
EOSINOPHIL NFR BLD AUTO: 5 % — SIGNIFICANT CHANGE UP (ref 0–6)
HCT VFR BLD CALC: 37.5 % — LOW (ref 39–50)
HGB BLD-MCNC: 13.7 G/DL — SIGNIFICANT CHANGE UP (ref 13–17)
LG PLATELETS BLD QL AUTO: SLIGHT — SIGNIFICANT CHANGE UP
LYMPHOCYTES # BLD AUTO: 37 % — SIGNIFICANT CHANGE UP (ref 13–44)
LYMPHOCYTES # BLD AUTO: 4.38 K/UL — HIGH (ref 1–3.3)
MACROCYTES BLD QL: SLIGHT — SIGNIFICANT CHANGE UP
MCHC RBC-ENTMCNC: 28.3 PG — SIGNIFICANT CHANGE UP (ref 27–34)
MCHC RBC-ENTMCNC: 36.5 GM/DL — HIGH (ref 32–36)
MCV RBC AUTO: 77.5 FL — LOW (ref 80–100)
MICROCYTES BLD QL: SLIGHT — SIGNIFICANT CHANGE UP
MONOCYTES # BLD AUTO: 0.59 K/UL — SIGNIFICANT CHANGE UP (ref 0–0.9)
MONOCYTES NFR BLD AUTO: 5 % — SIGNIFICANT CHANGE UP (ref 2–14)
NEUTROPHILS # BLD AUTO: 6.28 K/UL — SIGNIFICANT CHANGE UP (ref 1.8–7.4)
NEUTROPHILS NFR BLD AUTO: 53 % — SIGNIFICANT CHANGE UP (ref 43–77)
NRBC # BLD: 0 /100 WBCS — SIGNIFICANT CHANGE UP (ref 0–0)
NRBC # BLD: 2 — SIGNIFICANT CHANGE UP
OVALOCYTES BLD QL SMEAR: SLIGHT — SIGNIFICANT CHANGE UP
PLAT MORPH BLD: NORMAL — SIGNIFICANT CHANGE UP
PLATELET # BLD AUTO: 456 K/UL — HIGH (ref 150–400)
POIKILOCYTOSIS BLD QL AUTO: SLIGHT — SIGNIFICANT CHANGE UP
RBC # BLD: 4.84 M/UL — SIGNIFICANT CHANGE UP (ref 4.2–5.8)
RBC # FLD: 18 % — HIGH (ref 10.3–14.5)
RBC BLD AUTO: ABNORMAL
SCHISTOCYTES BLD QL AUTO: SLIGHT — SIGNIFICANT CHANGE UP
TARGETS BLD QL SMEAR: SIGNIFICANT CHANGE UP
WBC # BLD: 11.84 K/UL — HIGH (ref 3.8–10.5)
WBC # FLD AUTO: 11.84 K/UL — HIGH (ref 3.8–10.5)

## 2020-03-03 PROCEDURE — 99215 OFFICE O/P EST HI 40 MIN: CPT

## 2020-03-03 NOTE — ASSESSMENT
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 \par He has had no painful episodes. He has had no infections or leg ulcers. Except for transient blurry vision upon awakening he has no visual c/o.\par He has a polytypic CD5(+) B-cell population of unclear significance. Such populations maybe associated with autoimmune dz (SANAZ,Rheumatoid factor, ESR were drawn).\par Thrombophilia evaluation was done including anticardiolipin antibodies, anti-beta1 glycoprotein 2 antibodies, factorV Leiden mut, prothrombin gene mut, levels of protein C,S and antithrombin III were checked. \par Immunoprotein studies were drawn.\par He will require f/u CT chest as well as TTE for f/u of pulm infiltrates as well of evidence in prior RAD of pulm HTN and RV dysfunction.\par Eliquis 5mg po 2X/D was renewed. \par RV 4 weeks at which time f/u CT of chest and cardiac echo can be scheduled.\par

## 2020-03-03 NOTE — ADDENDUM
[FreeTextEntry1] : Documented by Elyssa Vallejo acting as a scribe for Dr. Rip Hinkle on 03/03/2020\par \par All medical record entries made by a scribe were at my, Dr. Rip Hinkle direction and personally dictated by me on 03/03/2020 . I have reviewed the chart and agree that the record accurately reflects my personal performance of the history, physical exam, procedure and imaging.

## 2020-03-03 NOTE — HISTORY OF PRESENT ILLNESS
[de-identified] : Initial outpatient visit. [de-identified] : Mr. Muhammad is a 50 year old man, who was recently diagnosed with a pulmonary embolism and is here for a follow up with respect to his anti-coags and also his prior evaluation done for leukocytosis. \par \par On 2/13/20, he began to develop increased SOB and chest pain. The pain was sudden in onset. He had no other significant sxs. \par He was tachycardiac on admission with elevated troponin levels at 243 then 246.  \par CTA showed bilateral segmental and subsegmental PE long with bilateral hilar LAD.  Bilateral patchy opacities were seen which were also new. The LAD was new vs prior CTA done in 3/2019. \par Echo showed dilated RV with a decrease in RV systolic functions vs prior study from 3/2019. There was also moderate pulmonary hypertension. \par Before starting Heparin, he had a normal PT and aPTT. He was placed on Eliquis prior to discharge and he is now taking 5mg po 2X/D. \par \par Because of persistent monocytosis as well  as presence smudge cells and NRBC, he had a BM done on 2/13/20 which showed erythroid - predominate trilineage hematopoiesis, mild polytypic plasma cytosis  and a minute population of CD5(+) polytypic B-cells.\par A similar CD5(+) population was seen in PB. 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 The pt gives a h/o a brother with what may have been SS dz.\par His past h/o is other wise unremarkable. he was seen at [facility] on 3/12/19 with hiccups with epigastric pain, BRIZUELA and abnormal EKG and had a (-) w/u.\par Mild to moderate leukocytosis was present which downtrended at time at discharge although WBC was somewhat over 12 K . Diff was unremarkable. \par He has had no h/o rheumatic complaints , fevers, rash, neurologic c/o. \par

## 2020-03-03 NOTE — PHYSICAL EXAM
[Fully active, able to carry on all pre-disease performance without restriction] : Status 0 - Fully active, able to carry on all pre-disease performance without restriction [de-identified] : no CVAT [Normal] : normal spine exam without palpable tenderness, no kyphosis or scoliosis

## 2020-03-25 ENCOUNTER — OUTPATIENT (OUTPATIENT)
Dept: OUTPATIENT SERVICES | Facility: HOSPITAL | Age: 50
LOS: 1 days | Discharge: HOME | End: 2020-03-25

## 2020-03-25 DIAGNOSIS — D72.829 ELEVATED WHITE BLOOD CELL COUNT, UNSPECIFIED: ICD-10-CM

## 2020-03-25 DIAGNOSIS — Z98.890 OTHER SPECIFIED POSTPROCEDURAL STATES: Chronic | ICD-10-CM

## 2020-03-31 ENCOUNTER — APPOINTMENT (OUTPATIENT)
Dept: HEMATOLOGY ONCOLOGY | Facility: CLINIC | Age: 50
End: 2020-03-31

## 2021-04-30 ENCOUNTER — INPATIENT (INPATIENT)
Facility: HOSPITAL | Age: 51
LOS: 2 days | Discharge: HOME | End: 2021-05-03
Attending: INTERNAL MEDICINE | Admitting: INTERNAL MEDICINE
Payer: MEDICAID

## 2021-04-30 VITALS
OXYGEN SATURATION: 92 % | SYSTOLIC BLOOD PRESSURE: 123 MMHG | TEMPERATURE: 99 F | DIASTOLIC BLOOD PRESSURE: 94 MMHG | RESPIRATION RATE: 16 BRPM | HEART RATE: 104 BPM | HEIGHT: 72 IN

## 2021-04-30 DIAGNOSIS — R09.89 OTHER SPECIFIED SYMPTOMS AND SIGNS INVOLVING THE CIRCULATORY AND RESPIRATORY SYSTEMS: ICD-10-CM

## 2021-04-30 DIAGNOSIS — R07.9 CHEST PAIN, UNSPECIFIED: ICD-10-CM

## 2021-04-30 DIAGNOSIS — Z98.890 OTHER SPECIFIED POSTPROCEDURAL STATES: Chronic | ICD-10-CM

## 2021-04-30 DIAGNOSIS — I26.99 OTHER PULMONARY EMBOLISM WITHOUT ACUTE COR PULMONALE: ICD-10-CM

## 2021-04-30 LAB
ALBUMIN SERPL ELPH-MCNC: 4.2 G/DL — SIGNIFICANT CHANGE UP (ref 3.3–5)
ALP SERPL-CCNC: 86 U/L — SIGNIFICANT CHANGE UP (ref 40–120)
ALT FLD-CCNC: 13 U/L — SIGNIFICANT CHANGE UP (ref 4–41)
ANION GAP SERPL CALC-SCNC: 6 MMOL/L — LOW (ref 7–14)
APTT BLD: 25.4 SEC — LOW (ref 27–36.3)
AST SERPL-CCNC: 23 U/L — SIGNIFICANT CHANGE UP (ref 4–40)
BASOPHILS # BLD AUTO: 0.1 K/UL — SIGNIFICANT CHANGE UP (ref 0–0.2)
BASOPHILS NFR BLD AUTO: 0.8 % — SIGNIFICANT CHANGE UP (ref 0–2)
BILIRUB SERPL-MCNC: 1.5 MG/DL — HIGH (ref 0.2–1.2)
BUN SERPL-MCNC: 6 MG/DL — LOW (ref 7–23)
CALCIUM SERPL-MCNC: 9.2 MG/DL — SIGNIFICANT CHANGE UP (ref 8.4–10.5)
CHLORIDE SERPL-SCNC: 107 MMOL/L — SIGNIFICANT CHANGE UP (ref 98–107)
CO2 SERPL-SCNC: 26 MMOL/L — SIGNIFICANT CHANGE UP (ref 22–31)
CREAT SERPL-MCNC: 1.07 MG/DL — SIGNIFICANT CHANGE UP (ref 0.5–1.3)
EOSINOPHIL # BLD AUTO: 0.36 K/UL — SIGNIFICANT CHANGE UP (ref 0–0.5)
EOSINOPHIL NFR BLD AUTO: 2.8 % — SIGNIFICANT CHANGE UP (ref 0–6)
GLUCOSE SERPL-MCNC: 81 MG/DL — SIGNIFICANT CHANGE UP (ref 70–99)
HCT VFR BLD CALC: 37.2 % — LOW (ref 39–50)
HGB BLD-MCNC: 13.4 G/DL — SIGNIFICANT CHANGE UP (ref 13–17)
IANC: 8.22 K/UL — SIGNIFICANT CHANGE UP (ref 1.5–8.5)
IMM GRANULOCYTES NFR BLD AUTO: 0.9 % — SIGNIFICANT CHANGE UP (ref 0–1.5)
INR BLD: 1.18 RATIO — HIGH (ref 0.88–1.16)
LYMPHOCYTES # BLD AUTO: 17.5 % — SIGNIFICANT CHANGE UP (ref 13–44)
LYMPHOCYTES # BLD AUTO: 2.23 K/UL — SIGNIFICANT CHANGE UP (ref 1–3.3)
MAGNESIUM SERPL-MCNC: 2.2 MG/DL — SIGNIFICANT CHANGE UP (ref 1.6–2.6)
MCHC RBC-ENTMCNC: 30 PG — SIGNIFICANT CHANGE UP (ref 27–34)
MCHC RBC-ENTMCNC: 36 GM/DL — SIGNIFICANT CHANGE UP (ref 32–36)
MCV RBC AUTO: 83.2 FL — SIGNIFICANT CHANGE UP (ref 80–100)
MONOCYTES # BLD AUTO: 1.73 K/UL — HIGH (ref 0–0.9)
MONOCYTES NFR BLD AUTO: 13.6 % — SIGNIFICANT CHANGE UP (ref 2–14)
NEUTROPHILS # BLD AUTO: 8.22 K/UL — HIGH (ref 1.8–7.4)
NEUTROPHILS NFR BLD AUTO: 64.4 % — SIGNIFICANT CHANGE UP (ref 43–77)
NRBC # BLD: 2 /100 WBCS — SIGNIFICANT CHANGE UP
NRBC # FLD: 0.2 K/UL — HIGH
NT-PROBNP SERPL-SCNC: 313 PG/ML — HIGH
PLATELET # BLD AUTO: 317 K/UL — SIGNIFICANT CHANGE UP (ref 150–400)
POTASSIUM SERPL-MCNC: 5 MMOL/L — SIGNIFICANT CHANGE UP (ref 3.5–5.3)
POTASSIUM SERPL-SCNC: 5 MMOL/L — SIGNIFICANT CHANGE UP (ref 3.5–5.3)
PROT SERPL-MCNC: 7.8 G/DL — SIGNIFICANT CHANGE UP (ref 6–8.3)
PROTHROM AB SERPL-ACNC: 13.3 SEC — SIGNIFICANT CHANGE UP (ref 10.6–13.6)
RBC # BLD: 4.47 M/UL — SIGNIFICANT CHANGE UP (ref 4.2–5.8)
RBC # FLD: 19 % — HIGH (ref 10.3–14.5)
SARS-COV-2 RNA SPEC QL NAA+PROBE: SIGNIFICANT CHANGE UP
SODIUM SERPL-SCNC: 139 MMOL/L — SIGNIFICANT CHANGE UP (ref 135–145)
TROPONIN T, HIGH SENSITIVITY RESULT: 33 NG/L — SIGNIFICANT CHANGE UP
TROPONIN T, HIGH SENSITIVITY RESULT: 37 NG/L — SIGNIFICANT CHANGE UP
WBC # BLD: 12.75 K/UL — HIGH (ref 3.8–10.5)
WBC # FLD AUTO: 12.75 K/UL — HIGH (ref 3.8–10.5)

## 2021-04-30 PROCEDURE — 93010 ELECTROCARDIOGRAM REPORT: CPT

## 2021-04-30 PROCEDURE — 71275 CT ANGIOGRAPHY CHEST: CPT | Mod: 26

## 2021-04-30 PROCEDURE — 71046 X-RAY EXAM CHEST 2 VIEWS: CPT | Mod: 26

## 2021-04-30 PROCEDURE — 99223 1ST HOSP IP/OBS HIGH 75: CPT

## 2021-04-30 PROCEDURE — 99285 EMERGENCY DEPT VISIT HI MDM: CPT | Mod: 25

## 2021-04-30 RX ORDER — HEPARIN SODIUM 5000 [USP'U]/ML
3000 INJECTION INTRAVENOUS; SUBCUTANEOUS EVERY 6 HOURS
Refills: 0 | Status: DISCONTINUED | OUTPATIENT
Start: 2021-04-30 | End: 2021-05-01

## 2021-04-30 RX ORDER — ASPIRIN/CALCIUM CARB/MAGNESIUM 324 MG
162 TABLET ORAL ONCE
Refills: 0 | Status: COMPLETED | OUTPATIENT
Start: 2021-04-30 | End: 2021-04-30

## 2021-04-30 RX ORDER — HEPARIN SODIUM 5000 [USP'U]/ML
6500 INJECTION INTRAVENOUS; SUBCUTANEOUS ONCE
Refills: 0 | Status: COMPLETED | OUTPATIENT
Start: 2021-04-30 | End: 2021-04-30

## 2021-04-30 RX ORDER — HEPARIN SODIUM 5000 [USP'U]/ML
6500 INJECTION INTRAVENOUS; SUBCUTANEOUS EVERY 6 HOURS
Refills: 0 | Status: DISCONTINUED | OUTPATIENT
Start: 2021-04-30 | End: 2021-05-01

## 2021-04-30 RX ORDER — HEPARIN SODIUM 5000 [USP'U]/ML
INJECTION INTRAVENOUS; SUBCUTANEOUS
Qty: 25000 | Refills: 0 | Status: DISCONTINUED | OUTPATIENT
Start: 2021-04-30 | End: 2021-05-01

## 2021-04-30 RX ADMIN — HEPARIN SODIUM 1500 UNIT(S)/HR: 5000 INJECTION INTRAVENOUS; SUBCUTANEOUS at 18:46

## 2021-04-30 RX ADMIN — HEPARIN SODIUM 6500 UNIT(S): 5000 INJECTION INTRAVENOUS; SUBCUTANEOUS at 18:45

## 2021-04-30 RX ADMIN — Medication 162 MILLIGRAM(S): at 13:43

## 2021-04-30 NOTE — H&P ADULT - NSHPLABSRESULTS_GEN_ALL_CORE
13.4   12.75 )-----------( 317      ( 30 Apr 2021 13:56 )             37.2     Hemoglobin: 13.4 g/dL (04-30 @ 13:56)    CBC Full  -  ( 30 Apr 2021 13:56 )  WBC Count : 12.75 K/uL  RBC Count : 4.47 M/uL  Hemoglobin : 13.4 g/dL  Hematocrit : 37.2 %  Platelet Count - Automated : 317 K/uL  Mean Cell Volume : 83.2 fL  Mean Cell Hemoglobin : 30.0 pg  Mean Cell Hemoglobin Concentration : 36.0 gm/dL  Auto Neutrophil # : 8.22 K/uL  Auto Lymphocyte # : 2.23 K/uL  Auto Monocyte # : 1.73 K/uL  Auto Eosinophil # : 0.36 K/uL  Auto Basophil # : 0.10 K/uL  Auto Neutrophil % : 64.4 %  Auto Lymphocyte % : 17.5 %  Auto Monocyte % : 13.6 %  Auto Eosinophil % : 2.8 %  Auto Basophil % : 0.8 %    04-30    139  |  107  |  6<L>  ----------------------------<  81  5.0   |  26  |  1.07    Ca    9.2      30 Apr 2021 13:56  Mg     2.2     04-30    TPro  7.8  /  Alb  4.2  /  TBili  1.5<H>  /  DBili  x   /  AST  23  /  ALT  13  /  AlkPhos  86  04-30    Creatinine Trend: 1.07<--  LIVER FUNCTIONS - ( 30 Apr 2021 13:56 )  Alb: 4.2 g/dL / Pro: 7.8 g/dL / ALK PHOS: 86 U/L / ALT: 13 U/L / AST: 23 U/L / GGT: x           PT/INR - ( 30 Apr 2021 13:56 )   PT: 13.3 sec;   INR: 1.18 ratio         PTT - ( 30 Apr 2021 13:56 )  PTT:25.4 sec    hs Troponin:                37 <<== 04-30-21 @ 15:40                33 <<== 04-30-21 @ 13:56      CSF:      EKG:   MICROBIOLOGY:    IMAGING:    < from: Xray Chest 2 Views PA/Lat (04.30.21 @ 15:03) >    IMPRESSION: Interstitial prominence likely reflects shallow inspiration; mild interstitial edema cannot be fully excluded. Stable blunting right costophrenic angle sulcus likely small pleural effusion versus pleural thickening.    < from: CT Angio Chest w/ IV Cont (04.30.21 @ 17:15) >    IMPRESSION:    Embolus within the anterior segmental and subsegmental right upper lobe pulmonary arteries is smaller yet has an acute-appearing morphology. Otherwise, there is interval resolution of pulmonary embolus. This may represent subacute embolus although acute embolus is not excluded.    Decreased perihilar opacities. Etiology is uncertain.

## 2021-04-30 NOTE — H&P ADULT - PROBLEM SELECTOR PLAN 3
Transitions of Care Status:  1.  Name of PCP:  2.  PCP Contacted on Admission: [ ] Y    [ ] N    3.  PCP contacted at Discharge: [ ] Y    [ ] N    [ ] N/A  4.  Post-Discharge Appointment Date and Location:  5.  Summary of Handoff given to PCP: -will obtain more collateral in AM -will obtain more collateral in AM  -Trend CBC.  - No signs of SC crisis on admission

## 2021-04-30 NOTE — H&P ADULT - NSICDXPASTMEDICALHX_GEN_ALL_CORE_FT
PAST MEDICAL HISTORY:  Bilateral pulmonary embolism Feb 2020    H/O sickle cell trait     Myocardial infarct

## 2021-04-30 NOTE — ED PROVIDER NOTE - PHYSICAL EXAMINATION
PHYSICAL EXAM:   General: well-appearing, appears stated age, not in extremis   HEENT: NC/AT, airway patent  Cardiovascular: tachycardic rate and regular rhythm, + S1/S2, ?third heart sound, no murmurs, rubs, gallops appreciated  Respiratory: clear to auscultation bilaterally, good aeration bilaterally, nonlabored respirations, 2 L NC for hypoxia to 90% RA  Abdominal: soft, nontender, nondistended, no rebound, guarding or rigidity  Extremities: no LE edema b/l. Radial pulses equal and strong b/l  Neuro: awake, alert, interactive  -Sonia Diaz PGY-3

## 2021-04-30 NOTE — ED PROVIDER NOTE - ATTENDING CONTRIBUTION TO CARE
I performed a face-to-face evaluation of the patient and performed a history and physical examination. I agree with the history and physical examination. I performed a face-to-face evaluation of the patient and performed a history and physical examination. I agree with the history and physical examination.    H/o MI. P/w SOB and CP. Not likely PNA: no infectious Sx (eg, purulent cough). Not likely PE or other VTE: PERC or Wells low score, EKG no e/o R-heart strain. Noted to be tachycardic. Check d-dimer, EKG, CXR to look for ACS or PE.

## 2021-04-30 NOTE — H&P ADULT - ASSESSMENT
51M AA with PMH of MI, b/l PE in Feb 2020, SC hgbSC w/o crisis, presenting to ED with CP similar to last year, w CTA chest demonstrating subacute vs acute anterior segmental and subsegmental RUL PE.

## 2021-04-30 NOTE — H&P ADULT - NSHPREVIEWOFSYSTEMS_GEN_ALL_CORE
CONSTITUTIONAL: No weakness, fevers or chills  EYES/ENT: No visual changes;  No dysphagia  NECK: No pain or stiffness  RESPIRATORY: No cough, wheezing, hemoptysis;  CARDIOVASCULAR: No palpitations; No lower extremity edema  GASTROINTESTINAL: No abdominal or epigastric pain. No nausea, vomiting, or hematemesis; No diarrhea or constipation. No melena or hematochezia.  GENITOURINARY: see HPI above  NEUROLOGICAL: No numbness or weakness  HEMATOLOGY: No easy bleeding  SKIN: No itching, burning, rashes, or lesions   All other review of systems is negative unless indicated above.
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
## 2021-04-30 NOTE — ED PROVIDER NOTE - CARE PLAN
Principal Discharge DX:	Chest pain   Principal Discharge DX:	Chest pain  Secondary Diagnosis:	Pulmonary embolism

## 2021-04-30 NOTE — H&P ADULT - ATTENDING COMMENTS
Agree with above resident documentation. Patient examined at bedside. 51M AA with PMH of MI, b/l PE in Feb 2020, SC hgbSC w/o crisis, presenting to ED with CP similar to last year, w CTA chest demonstrating subacute vs acute anterior segmental and subsegmental RUL PE. Started on heparin gtt on admission. Currently hd stable and no resp distress. Second episode unprovoked PE likely requiring longterm > 3months full AC. Will continue to monitor sx. Check TTE given submassive intermediate risk PE at present.
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
## 2021-04-30 NOTE — ED PROVIDER NOTE - CLINICAL SUMMARY MEDICAL DECISION MAKING FREE TEXT BOX
[place]: H/o MI. P/w CP and SOB. No significant dizziness/sweating/nausea. Not likely PNA: no infectious Sx (eg, purulent cough). Not likely PE or other VTE: PERC or Wells low score, EKG no e/o R-heart strain. No leg swelliing. Not likely PNA: no infectious Sx (eg, purulent cough). Not likely PE or other VTE: PERC or Wells low score, EKG no e/o R-heart strain. No recent cardiac w/u. No F.

## 2021-04-30 NOTE — H&P ADULT - PROBLEM SELECTOR PLAN 2
-DVT ppx: hep gtt  -Diet: DASH/TLC, no meat, only fish -previous MI last year per patient, not on any meds  -neg trops  -will obtain more collateral in AM -previous MI last year per patient, not on any meds  -No signs of ACS at present.  -Check TTE.  -will obtain more collateral in AM

## 2021-04-30 NOTE — H&P ADULT - NSHPSOCIALHISTORY_GEN_ALL_CORE
Lives with sister, currently unemployed; was laid off d/t COVID from [employer]. Denies illicit, alcohol, and cigarette use.  Has two children, daughter and son.  Prefers we communicate with sister Georgia [phone].

## 2021-04-30 NOTE — H&P ADULT - PROBLEM SELECTOR PLAN 1
-Second unprovoked PE; first in Feb 2020, but pt no longer taking eliquis  -s/p ASA and hep gtt started in ED  -consider hypercoag panel for etiology  -strong FHx of CA, can consider pan CT scan  -f/u UA, c/f UTI/prostatitis  -f/u TTE to re-evaluate right side of heart  -consider b/l LE dopplers for DVT -Second unprovoked PE; first in Feb 2020, but pt no longer taking eliquis (clarify when/why he stopped)  -s/p ASA and hep gtt started in ED  -can consider hypercoag panel for etiology  -strong FHx of CA, can consider pan CT scan  -f/u UA, c/f UTI/prostatitis  -f/u TTE to re-evaluate right side of heart
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
## 2021-04-30 NOTE — H&P ADULT - NSICDXFAMILYHX_GEN_ALL_CORE_FT
FAMILY HISTORY:  Family history of cancer in mother, Breast cancer  FH: prostate cancer, Brother, passed away at 54
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
## 2021-04-30 NOTE — H&P ADULT - NSHPPHYSICALEXAM_GEN_ALL_CORE
Vital Signs Last 24 Hrs  T(C): 36.8 (30 Apr 2021 21:28), Max: 37.1 (30 Apr 2021 12:36)  T(F): 98.2 (30 Apr 2021 21:28), Max: 98.7 (30 Apr 2021 12:36)  HR: 87 (30 Apr 2021 21:28) (82 - 104)  BP: 134/83 (30 Apr 2021 21:28) (115/71 - 134/83)  BP(mean): --  RR: 18 (30 Apr 2021 21:28) (16 - 22)  SpO2: 97% (30 Apr 2021 21:28) (90% - 100%)    GENERAL: No acute distress, well-developed, on 2L NC  HEAD:  Atraumatic, Normocephalic  ENT: PERRL, conjunctiva and sclera clear, neck supple, no JVD, moist mucosa, posterior oropharynx clear  CHEST/LUNG: Clear to auscultation bilaterally; No wheeze, equal breath sounds bilaterally, respirations nonlabored  HEART: Regular rate and rhythm; No murmurs, rubs, or gallops  ABDOMEN: Soft, nontender, nondistended; Bowel sounds present  BACK: no CVA tenderness  EXTREMITIES:  No clubbing, cyanosis, or edema  PSYCH: Nl behavior, nl affect  NEUROLOGY: AAOx3-4, non-focal, moves all extremities spontaneously  SKIN: Normal color, No rashes or lesions

## 2021-04-30 NOTE — ED ADULT NURSE NOTE - NSIMPLEMENTINTERV_GEN_ALL_ED
Implemented All Fall Risk Interventions:  [place] to call system. Call bell, personal items and telephone within reach. Instruct patient to call for assistance. Room bathroom lighting operational. Non-slip footwear when patient is off stretcher. Physically safe environment: no spills, clutter or unnecessary equipment. Stretcher in lowest position, wheels locked, appropriate side rails in place. Provide visual cue, wrist band, yellow gown, etc. Monitor gait and stability. Monitor for mental status changes and reorient to person, place, and time. Review medications for side effects contributing to fall risk. Reinforce activity limits and safety measures with patient and family.

## 2021-04-30 NOTE — ED ADULT NURSE REASSESSMENT NOTE - NS ED NURSE REASSESS COMMENT FT1
break coverage RN. Pt a&o3, calm and cooperative, denies any chest pain, sob, n/v/d. Respirations even/unlabored, nad noted. MD Goldsmith at bedside to eval. Pt verbalizes understanding of medical findings and medication. Pt aware of safety precaution and educated.

## 2021-05-01 ENCOUNTER — TRANSCRIPTION ENCOUNTER (OUTPATIENT)
Age: 51
End: 2021-05-01

## 2021-05-01 DIAGNOSIS — Z29.9 ENCOUNTER FOR PROPHYLACTIC MEASURES, UNSPECIFIED: ICD-10-CM

## 2021-05-01 DIAGNOSIS — I25.10 ATHEROSCLEROTIC HEART DISEASE OF NATIVE CORONARY ARTERY WITHOUT ANGINA PECTORIS: ICD-10-CM

## 2021-05-01 DIAGNOSIS — Z02.9 ENCOUNTER FOR ADMINISTRATIVE EXAMINATIONS, UNSPECIFIED: ICD-10-CM

## 2021-05-01 DIAGNOSIS — I26.99 OTHER PULMONARY EMBOLISM WITHOUT ACUTE COR PULMONALE: ICD-10-CM

## 2021-05-01 DIAGNOSIS — D57.1 SICKLE-CELL DISEASE WITHOUT CRISIS: ICD-10-CM

## 2021-05-01 LAB
ALBUMIN SERPL ELPH-MCNC: 3.8 G/DL — SIGNIFICANT CHANGE UP (ref 3.3–5)
ALP SERPL-CCNC: 72 U/L — SIGNIFICANT CHANGE UP (ref 40–120)
ALT FLD-CCNC: 8 U/L — SIGNIFICANT CHANGE UP (ref 4–41)
ANION GAP SERPL CALC-SCNC: 10 MMOL/L — SIGNIFICANT CHANGE UP (ref 7–14)
APPEARANCE UR: CLEAR — SIGNIFICANT CHANGE UP
APTT BLD: 101.3 SEC — HIGH (ref 27–36.3)
APTT BLD: 86.4 SEC — HIGH (ref 27–36.3)
AST SERPL-CCNC: 17 U/L — SIGNIFICANT CHANGE UP (ref 4–40)
BASOPHILS # BLD AUTO: 0.1 K/UL — SIGNIFICANT CHANGE UP (ref 0–0.2)
BASOPHILS NFR BLD AUTO: 0.8 % — SIGNIFICANT CHANGE UP (ref 0–2)
BILIRUB SERPL-MCNC: 1.2 MG/DL — SIGNIFICANT CHANGE UP (ref 0.2–1.2)
BILIRUB UR-MCNC: NEGATIVE — SIGNIFICANT CHANGE UP
BUN SERPL-MCNC: 8 MG/DL — SIGNIFICANT CHANGE UP (ref 7–23)
CALCIUM SERPL-MCNC: 8.7 MG/DL — SIGNIFICANT CHANGE UP (ref 8.4–10.5)
CHLORIDE SERPL-SCNC: 109 MMOL/L — HIGH (ref 98–107)
CO2 SERPL-SCNC: 25 MMOL/L — SIGNIFICANT CHANGE UP (ref 22–31)
COLOR SPEC: YELLOW — SIGNIFICANT CHANGE UP
COVID-19 SPIKE DOMAIN AB INTERP: POSITIVE
COVID-19 SPIKE DOMAIN ANTIBODY RESULT: >250 U/ML — HIGH
CREAT SERPL-MCNC: 1 MG/DL — SIGNIFICANT CHANGE UP (ref 0.5–1.3)
DIFF PNL FLD: NEGATIVE — SIGNIFICANT CHANGE UP
EOSINOPHIL # BLD AUTO: 0.6 K/UL — HIGH (ref 0–0.5)
EOSINOPHIL NFR BLD AUTO: 4.9 % — SIGNIFICANT CHANGE UP (ref 0–6)
GLUCOSE SERPL-MCNC: 86 MG/DL — SIGNIFICANT CHANGE UP (ref 70–99)
GLUCOSE UR QL: NEGATIVE — SIGNIFICANT CHANGE UP
HCT VFR BLD CALC: 34.1 % — LOW (ref 39–50)
HGB BLD-MCNC: 12.4 G/DL — LOW (ref 13–17)
IANC: 7.41 K/UL — SIGNIFICANT CHANGE UP (ref 1.5–8.5)
IMM GRANULOCYTES NFR BLD AUTO: 0.5 % — SIGNIFICANT CHANGE UP (ref 0–1.5)
KETONES UR-MCNC: NEGATIVE — SIGNIFICANT CHANGE UP
LEUKOCYTE ESTERASE UR-ACNC: NEGATIVE — SIGNIFICANT CHANGE UP
LYMPHOCYTES # BLD AUTO: 2.97 K/UL — SIGNIFICANT CHANGE UP (ref 1–3.3)
LYMPHOCYTES # BLD AUTO: 24 % — SIGNIFICANT CHANGE UP (ref 13–44)
MAGNESIUM SERPL-MCNC: 2.2 MG/DL — SIGNIFICANT CHANGE UP (ref 1.6–2.6)
MCHC RBC-ENTMCNC: 30 PG — SIGNIFICANT CHANGE UP (ref 27–34)
MCHC RBC-ENTMCNC: 36.4 GM/DL — HIGH (ref 32–36)
MCV RBC AUTO: 82.6 FL — SIGNIFICANT CHANGE UP (ref 80–100)
MONOCYTES # BLD AUTO: 1.21 K/UL — HIGH (ref 0–0.9)
MONOCYTES NFR BLD AUTO: 9.8 % — SIGNIFICANT CHANGE UP (ref 2–14)
NEUTROPHILS # BLD AUTO: 7.41 K/UL — HIGH (ref 1.8–7.4)
NEUTROPHILS NFR BLD AUTO: 60 % — SIGNIFICANT CHANGE UP (ref 43–77)
NITRITE UR-MCNC: NEGATIVE — SIGNIFICANT CHANGE UP
NRBC # BLD: 1 /100 WBCS — SIGNIFICANT CHANGE UP
NRBC # FLD: 0.16 K/UL — HIGH
PH UR: 6.5 — SIGNIFICANT CHANGE UP (ref 5–8)
PHOSPHATE SERPL-MCNC: 4 MG/DL — SIGNIFICANT CHANGE UP (ref 2.5–4.5)
PLATELET # BLD AUTO: 309 K/UL — SIGNIFICANT CHANGE UP (ref 150–400)
POTASSIUM SERPL-MCNC: 4.2 MMOL/L — SIGNIFICANT CHANGE UP (ref 3.5–5.3)
POTASSIUM SERPL-SCNC: 4.2 MMOL/L — SIGNIFICANT CHANGE UP (ref 3.5–5.3)
PROT SERPL-MCNC: 6.7 G/DL — SIGNIFICANT CHANGE UP (ref 6–8.3)
PROT UR-MCNC: ABNORMAL
RBC # BLD: 4.13 M/UL — LOW (ref 4.2–5.8)
RBC # FLD: 18.6 % — HIGH (ref 10.3–14.5)
SARS-COV-2 IGG+IGM SERPL QL IA: >250 U/ML — HIGH
SARS-COV-2 IGG+IGM SERPL QL IA: POSITIVE
SODIUM SERPL-SCNC: 144 MMOL/L — SIGNIFICANT CHANGE UP (ref 135–145)
SP GR SPEC: 1.03 — HIGH (ref 1.01–1.02)
UROBILINOGEN FLD QL: ABNORMAL
WBC # BLD: 12.35 K/UL — HIGH (ref 3.8–10.5)
WBC # FLD AUTO: 12.35 K/UL — HIGH (ref 3.8–10.5)

## 2021-05-01 PROCEDURE — 99233 SBSQ HOSP IP/OBS HIGH 50: CPT | Mod: GC

## 2021-05-01 RX ORDER — APIXABAN 2.5 MG/1
10 TABLET, FILM COATED ORAL EVERY 12 HOURS
Refills: 0 | Status: DISCONTINUED | OUTPATIENT
Start: 2021-05-01 | End: 2021-05-03

## 2021-05-01 RX ORDER — HEPARIN SODIUM 5000 [USP'U]/ML
1300 INJECTION INTRAVENOUS; SUBCUTANEOUS
Qty: 25000 | Refills: 0 | Status: DISCONTINUED | OUTPATIENT
Start: 2021-05-01 | End: 2021-05-01

## 2021-05-01 RX ORDER — ACETAMINOPHEN 500 MG
650 TABLET ORAL EVERY 6 HOURS
Refills: 0 | Status: DISCONTINUED | OUTPATIENT
Start: 2021-05-01 | End: 2021-05-03

## 2021-05-01 RX ORDER — APIXABAN 2.5 MG/1
1 TABLET, FILM COATED ORAL
Qty: 72 | Refills: 0
Start: 2021-05-01 | End: 2021-05-30

## 2021-05-01 RX ADMIN — HEPARIN SODIUM 1300 UNIT(S)/HR: 5000 INJECTION INTRAVENOUS; SUBCUTANEOUS at 08:02

## 2021-05-01 RX ADMIN — HEPARIN SODIUM 1500 UNIT(S)/HR: 5000 INJECTION INTRAVENOUS; SUBCUTANEOUS at 01:18

## 2021-05-01 RX ADMIN — APIXABAN 10 MILLIGRAM(S): 2.5 TABLET, FILM COATED ORAL at 17:43

## 2021-05-01 NOTE — DISCHARGE NOTE PROVIDER - NSDCCPCAREPLAN_GEN_ALL_CORE_FT
PRINCIPAL DISCHARGE DIAGNOSIS  Diagnosis: Pulmonary embolism  Assessment and Plan of Treatment: You were found to have clots in your lungs, on both sides. You were started on anticoagulation with IV heparin. You were then transitioned to an oral blood thinner, Eliquis.  You should continue taking your oral anticoagulant (Eliquis) to prevent progression of your clots. You will need to continue Eliquis until further instructed. You will receive a 1-month free starter pack from [facility] in [facility]. Take 10 mg twice a day for a week. then afterwards take 5 mg twice a day.   Please follow up with a hematologist either at [facility] or at [facility], as you will need to refill this medication. Please return to the ED if you have worsening shortness of breath or chest pain. If you run out of medication and are unable to make appointments with a hematologist, call the [phone], the [address] [place] [address] Medicine office.      SECONDARY DISCHARGE DIAGNOSES  Diagnosis: Leukocytosis  Assessment and Plan of Treatment: You were found to have high white blood cell count which was also high on your prior admission back in March 2019, which was concerning for infection vs. malignancy. Your imaging did not reveal any obvious infection. You were seen previously at [facility] for work-up of the leukocytosis and a bone marrow biopsy. Please follow-up with your hematologist at [facility] or [facility] to continue management.     PRINCIPAL DISCHARGE DIAGNOSIS  Diagnosis: Pulmonary embolism  Assessment and Plan of Treatment: You were found to have clots in your lungs, on both sides. You were started on anticoagulation with IV heparin. You were then transitioned to an oral blood thinner, Eliquis.  You should continue taking your oral anticoagulant (Eliquis) to prevent progression of your clots. You will need to continue Eliquis until further instructed. Please continue to take 10 mg twice a day for 4 days. then afterwards take 5 mg twice a day for 3 months.  You are being discharged with oxygen, please continue to use this following your discharge until further recommendations from your primary care doctor or pulmonologist.   Please follow up with a pulmonologist when you leave the hospital for further management. Please return to the ED if you have worsening shortness of breath or chest pain.      SECONDARY DISCHARGE DIAGNOSES  Diagnosis: Leukocytosis  Assessment and Plan of Treatment: You were found to have high white blood cell count which was also high on your prior admission back in March 2019, which was concerning for infection vs. malignancy. Your imaging did not reveal any obvious infection. You were seen previously at [facility] for work-up of the leukocytosis and a bone marrow biopsy. Please follow-up with your hematologist at [facility] or [facility] to continue management.

## 2021-05-01 NOTE — DISCHARGE NOTE PROVIDER - NSDCFUADDAPPT_GEN_ALL_CORE_FT
Please follow-up with your primary care provider for further lab-work, monitoring, and management of your chronic health conditions as well as refills for your blood thinner for the pulmonary emboli.    Please follow-up with your hematologist for further management of your sickle cell trait that you mentioned and management if needed.

## 2021-05-01 NOTE — DISCHARGE NOTE PROVIDER - NSFOLLOWUPCLINICSTOKEN_GEN_ALL_ED_FT
146868:2 weeks|| ||00\01||False;907043:1 week|| ||00\01||False; 297948:2 weeks|| ||00\01||False;429896:1 week|| ||00\01||False;266614: || ||00\01||False;

## 2021-05-01 NOTE — DISCHARGE NOTE PROVIDER - NSFOLLOWUPCLINICS_GEN_ALL_ED_FT
[facility] - Primary Care  Primary Care  [address] [place] [address]  Phone: [phone]  Fax:   Follow Up Time: 2 weeks    [facility]  Hematology/Oncology  [address] [place] [id number]  Phone: [phone]  Fax:   Follow Up Time: 1 week     [facility] - Primary Care  Primary Care  [address] [place] [address]  Phone: [phone]  Fax:   Follow Up Time: 2 weeks    [facility]  Hematology/Oncology  [address] [place] [address]  Phone: [phone]  Fax:   Follow Up Time: 1 week    [facility]gy [facility]  Pulmonology  [address], [address]  [place] [address]  Phone: [phone]  Fax:

## 2021-05-01 NOTE — DISCHARGE NOTE PROVIDER - HOSPITAL COURSE
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 Pt states that he was "pushing his car" and started feeling acute onset of CP and SOB reminiscent of last year's PE. Denies f/c, URI symptoms, cough, sweating, n/v. Pt denies recent leg pain, leg swelling, changes in appetite, weight loss, hematochezia, melena, or change in stool caliber. Pt does endorse a couple of weeks of burning with urination.  Pt seems to get lost to OP f/u; was seen by heme/onc last year for smudge cells on peripheral smear and was prescribed eliquis for b/l segmental and subsegmental PE. Pt no longer taking medication.  Pt states Mom passed away from BC in her 70's  and brother passed away from prostate CA at 54. Pt does not believe he has had hypercoag work-up done.     In ED, pt afeb, tachy to 104, normotensive, and tachypneic to 22 saturating 90% on RA. Pt placed on 2L NC, with 97% sat. Pt given ASA and placed on hep gtt. EKG w/o e/o right heart strain.    Hospital Course:    Patient was transitioned from heparin to Eliquis. UA negative for UTI. TTE was obtained which showed ___________. Patient did not have any signs or symptoms of ACS nor sickle cell crisis mentioned in his past medical history and was told to follow-up with his primary care provider and his hematologist.     Patient is stable and medically cleared for discharge. 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 Pt states that he was "pushing his car" and started feeling acute onset of CP and SOB reminiscent of last year's PE. Denies f/c, URI symptoms, cough, sweating, n/v. Pt denies recent leg pain, leg swelling, changes in appetite, weight loss, hematochezia, melena, or change in stool caliber. Pt does endorse a couple of weeks of burning with urination.  Pt seems to get lost to OP f/u; was seen by heme/onc last year for smudge cells on peripheral smear and was prescribed eliquis for b/l segmental and subsegmental PE. Pt no longer taking medication.  Pt states Mom passed away from BC in her 70's  and brother passed away from prostate CA at 54. Pt does not believe he has had hypercoag work-up done.     In ED, pt afeb, tachy to 104, normotensive, and tachypneic to 22 saturating 90% on RA. Pt placed on 2L NC, with 97% sat. Pt given ASA and placed on hep gtt. EKG w/o e/o right heart strain.    Hospital Course:    Patient was transitioned from heparin to Eliquis. UA negative for UTI. TTE was obtained which showed stable decreased RV systolic function. Patient did not have any signs or symptoms of ACS nor sickle cell crisis mentioned in his past medical history and was told to follow-up with his primary care provider and his hematologist.     Patient is stable and medically cleared for discharge. 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 Pt states that he was "pushing his car" and started feeling acute onset of CP and SOB reminiscent of last year's PE. Denies f/c, URI symptoms, cough, sweating, n/v. Pt denies recent leg pain, leg swelling, changes in appetite, weight loss, hematochezia, melena, or change in stool caliber. Pt does endorse a couple of weeks of burning with urination.  Pt seems to get lost to OP f/u; was seen by heme/onc last year for smudge cells on peripheral smear and was prescribed eliquis for b/l segmental and subsegmental PE. Pt no longer taking medication.  Pt states Mom passed away from BC in her 70's  and brother passed away from prostate CA at 54. Pt does not believe he has had hypercoag work-up done.     In ED, pt afeb, tachy to 104, normotensive, and tachypneic to 22 saturating 90% on RA. Pt placed on 2L NC, with 97% sat. Pt given ASA and placed on hep gtt. EKG w/o e/o right heart strain.    Hospital Course:    Patient throughout his course maintained normal blood pressures and was transitioned from heparin to Eliquis, which he will likely need lifelong given this is second unprovoked VTE. TTE was consistent with previous studies showing stable but decreased RV systolic function and elevated pulmonary arterial pressures. Patient had ambulatory saturations test done and qualified for 2L via NC. Patient did not have any signs or symptoms of ACS nor sickle cell crisis mentioned in his past medical history. He was told to follow-up with his primary care provider and his hematologist. He will also follow up with pulmonary given his new O2 requirement and pulmonary HTN.     Patient is stable and medically cleared for discharge.

## 2021-05-01 NOTE — PROGRESS NOTE ADULT - PROBLEM SELECTOR PLAN 4
-DVT ppx: hep gtt  -Diet: DASH/TLC, no meat, only fish - DVT ppx: hep gtt  - Diet: DASH/TLC, no meat, only fish

## 2021-05-01 NOTE — PROGRESS NOTE ADULT - SUBJECTIVE AND OBJECTIVE BOX
Octaviano Cheng MD  PGY-1, Internal Medicine  Pager #: [phone], [phone]    Patient is a 51y old  Male who presents with a chief complaint of PE (30 Apr 2021 22:40)    OVERNIGHT/INTERVAL EVENTS:    SUBJECTIVE: Patient seen and examined bedside.     Denies fevers/chills, headaches/dizziness, nausea/vomiting, chest pain, SOB, abdominal pain.     MEDICATIONS  (STANDING):  heparin  Infusion.  Unit(s)/Hr (15 mL/Hr) IV Continuous <Continuous>    MEDICATIONS  (PRN):  heparin   Injectable 6500 Unit(s) IV Push every 6 hours PRN For aPTT less than 40  heparin   Injectable 3000 Unit(s) IV Push every 6 hours PRN For aPTT between 40 - 57      CAPILLARY BLOOD GLUCOSE        I&O's Summary      PHYSICAL EXAM:  Vital Signs Last 24 Hrs  T(C): 36.7 (01 May 2021 05:55), Max: 37.1 (30 Apr 2021 12:36)  T(F): 98.1 (01 May 2021 05:55), Max: 98.7 (30 Apr 2021 12:36)  HR: 83 (01 May 2021 05:55) (82 - 104)  BP: 111/70 (01 May 2021 05:55) (111/70 - 134/83)  BP(mean): --  RR: 18 (01 May 2021 05:55) (16 - 22)  SpO2: 98% (01 May 2021 05:55) (90% - 100%)  CONSTITUTIONAL: NAD, lying in bed comfortably  EYES: EOMI, PERRLA; conjunctiva and sclera clear  ENMT: Moist oral mucosa; normal dentition  NECK: Supple, no palpable masses, no JVD  RESPIRATORY: Lungs clear to ascultation b/l; no rales/ronchi/wheezing; unlabored respirations  CARDIOVASCULAR: Regular rate and rhythm, normal S1 and S2, no murmurs/rubs/gallops  ABDOMEN: Soft, normal bowel sounds, nondistended, nontender  MUSCULOSKELETAL: No joint swelling or tenderness to palpation  PSYCH: Affect appropriate  NEUROLOGY: AAOx3, CNs grossly intact  SKIN: No rashes; no palpable lesions    LABS:                        13.4   12.75 )-----------( 317      ( 30 Apr 2021 13:56 )             37.2     04-30    139  |  107  |  6<L>  ----------------------------<  81  5.0   |  26  |  1.07    Ca    9.2      30 Apr 2021 13:56  Mg     2.2     04-30    TPro  7.8  /  Alb  4.2  /  TBili  1.5<H>  /  DBili  x   /  AST  23  /  ALT  13  /  AlkPhos  86  04-30    PT/INR - ( 30 Apr 2021 13:56 )   PT: 13.3 sec;   INR: 1.18 ratio         PTT - ( 01 May 2021 00:47 )  PTT:86.4 sec            INTERVAL RADIOLOGY/IMAGING STUDIES:     Octaviano Cheng MD  PGY-1, Internal Medicine  Pager #: [phone], [phone]    Patient is a 51y old  Male who presents with a chief complaint of PE (30 Apr 2021 22:40)    OVERNIGHT/INTERVAL EVENTS: No overnight events.     SUBJECTIVE: Patient seen and examined bedside. Patient currently does not have any acute complaints.     Denies fevers/chills, headaches/dizziness, nausea/vomiting, chest pain, SOB, abdominal pain.     MEDICATIONS  (STANDING):  heparin  Infusion.  Unit(s)/Hr (15 mL/Hr) IV Continuous <Continuous>    MEDICATIONS  (PRN):  heparin   Injectable 6500 Unit(s) IV Push every 6 hours PRN For aPTT less than 40  heparin   Injectable 3000 Unit(s) IV Push every 6 hours PRN For aPTT between 40 - 57      CAPILLARY BLOOD GLUCOSE        I&O's Summary      PHYSICAL EXAM:  Vital Signs Last 24 Hrs  T(C): 36.7 (01 May 2021 05:55), Max: 37.1 (30 Apr 2021 12:36)  T(F): 98.1 (01 May 2021 05:55), Max: 98.7 (30 Apr 2021 12:36)  HR: 83 (01 May 2021 05:55) (82 - 104)  BP: 111/70 (01 May 2021 05:55) (111/70 - 134/83)  RR: 18 (01 May 2021 05:55) (16 - 22)  SpO2: 98% (01 May 2021 05:55) (90% - 100%)    CONSTITUTIONAL: NAD, lying in bed comfortably, on 2L NC  EYES: EOMI, PERRLA; conjunctiva and sclera clear  ENMT: Moist oral mucosa; normal dentition  NECK: Supple, no palpable masses, no JVD  RESPIRATORY: Lungs clear to ascultation b/l; no rales/ronchi/wheezing; unlabored respirations  CARDIOVASCULAR: Regular rate and rhythm, normal S1 and S2, no murmurs/rubs/gallops  ABDOMEN: Soft, normal bowel sounds, nondistended, nontender  MUSCULOSKELETAL: No joint swelling or tenderness to palpation  PSYCH: Affect appropriate  NEUROLOGY: AAOx3, CNs grossly intact  SKIN: No rashes; no palpable lesions    LABS:                        13.4   12.75 )-----------( 317      ( 30 Apr 2021 13:56 )             37.2     04-30    139  |  107  |  6<L>  ----------------------------<  81  5.0   |  26  |  1.07    Ca    9.2      30 Apr 2021 13:56  Mg     2.2     04-30    TPro  7.8  /  Alb  4.2  /  TBili  1.5<H>  /  DBili  x   /  AST  23  /  ALT  13  /  AlkPhos  86  04-30    PT/INR - ( 30 Apr 2021 13:56 )   PT: 13.3 sec;   INR: 1.18 ratio         PTT - ( 01 May 2021 00:47 )  PTT:86.4 sec            INTERVAL RADIOLOGY/IMAGING STUDIES:

## 2021-05-01 NOTE — PROGRESS NOTE ADULT - PROBLEM SELECTOR PLAN 3
-will obtain more collateral in AM  -Trend CBC.  - No signs of SC crisis on admission - will obtain more collateral in AM, no current symptoms of crisis  - trend CBC.  - no signs of SC crisis on admission

## 2021-05-01 NOTE — DISCHARGE NOTE PROVIDER - NSDCCPTREATMENT_GEN_ALL_CORE_FT
PRINCIPAL PROCEDURE  Procedure: CT angiogram chest w contrast  Findings and Treatment: EXAM:  CT ANGIO CHEST (W)AW IC    PROCEDURE DATE:  Apr 30 2021   INTERPRETATION:  CLINICAL INFORMATION: Shortness of breath and tachycardia. History of pulmonary embolus.  COMPARISON: CT angiogram of the chest 2/10/2020.  CONTRAST/COMPLICATIONS:  IV Contrast: Omnipaque 350. 90 cc administered. 10 cc discarded.  Oral Contrast: None.  Complications: None reported at time of study completion.  PROCEDURE:  CT Angiography of the Chest.  Sagittal and coronal reformats were performed as well as 3D (MIP) reconstructions.  FINDINGS:  LUNGS AND AIRWAYS: Patent central airways.  Bilateral perihilar opacities are decreased 2/10/2020.  PLEURA: No pleural effusion.  MEDIASTINUM AND NICOLÁS: No lymphadenopathy.  VESSELS: Respiratory motion artifact limits evaluation of the lower lobe subsegmental pulmonary arteries. Embolus within the anterior segmental and subsegmental right upper lobe pulmonary arteries is smaller yet centrally located within the vessel lumen. Resolution of other pulmonary emboli. The main pulmonary artery measures 3.6 cm. Normal caliber aorta.  HEART: The right heart is enlarged with right ventricular hypertrophy. No pericardial effusion.  CHEST WALL AND LOWER NECK: Within normal limits.  VISUALIZED UPPER ABDOMEN: Within normal limits.  BONES: Within normal limits.  IMPRESSION:  Embolus within the anterior segmental and subsegmental right upper lobe pulmonary arteries is smaller yet has an acute-appearing morphology. Otherwise, there is interval resolution of pulmonary embolus. This may represent subacute embolus although acute embolus is not excluded.  Decreased perihilar opacities. Etiology is uncertain.

## 2021-05-01 NOTE — PROGRESS NOTE ADULT - PROBLEM SELECTOR PLAN 1
-Second unprovoked PE; first in Feb 2020, but pt no longer taking eliquis (clarify when/why he stopped)  -s/p ASA and hep gtt started in ED  -can consider hypercoag panel for etiology  -strong FHx of CA, can consider pan CT scan  -f/u UA, c/f UTI/prostatitis  -f/u TTE to re-evaluate right side of heart - second unprovoked PE; first in Feb 2020, but pt no longer taking eliquis (pt reports he stopped because he "completed his course" in Jan 2021)  - s/p ASA and hep gtt started in ED  - can consider hypercoag panel for etiology  - UA negative  - transition to DOAC today  - f/u TTE to re-evaluate right side of heart Second unprovoked PE; first in Feb 2020, but pt no longer taking eliquis (pt reports he stopped because he "completed his course" in Jan 2021)  - s/p ASA and hep gtt started in ED  - can consider hypercoag panel for etiology  - UA negative  - transition to DOAC today  - f/u TTE to re-evaluate right side of heart

## 2021-05-01 NOTE — PROGRESS NOTE ADULT - PROBLEM SELECTOR PLAN 2
-previous MI last year per patient, not on any meds  -No signs of ACS at present.  -Check TTE.  -will obtain more collateral in AM - previous MI last year per patient, not on any meds  - No signs of ACS at present.  - f/u TTE - previous MI last year per patient, not on any meds  - no signs of ACS at present.  - f/u TTE

## 2021-05-01 NOTE — DISCHARGE NOTE PROVIDER - NSDCMRMEDTOKEN_GEN_ALL_CORE_FT
Eliquis Starter Pack for Treatment of DVT and PE 5 mg oral tablet: 2 tab(s) orally 2 times a day x 6 days  1 tab orally 2 times a day THEREAFTER

## 2021-05-01 NOTE — DISCHARGE NOTE PROVIDER - CARE PROVIDER_API CALL
Jono Lopez)  Hematology; Internal Medicine; Medical Oncology  [address] [place] [address]  Phone: [phone]  Fax: [phone]  Follow Up Time: 1 month

## 2021-05-02 LAB
ANION GAP SERPL CALC-SCNC: 10 MMOL/L — SIGNIFICANT CHANGE UP (ref 7–14)
BASOPHILS # BLD AUTO: 0.08 K/UL — SIGNIFICANT CHANGE UP (ref 0–0.2)
BASOPHILS NFR BLD AUTO: 0.8 % — SIGNIFICANT CHANGE UP (ref 0–2)
BUN SERPL-MCNC: 7 MG/DL — SIGNIFICANT CHANGE UP (ref 7–23)
CALCIUM SERPL-MCNC: 8.5 MG/DL — SIGNIFICANT CHANGE UP (ref 8.4–10.5)
CHLORIDE SERPL-SCNC: 107 MMOL/L — SIGNIFICANT CHANGE UP (ref 98–107)
CO2 SERPL-SCNC: 23 MMOL/L — SIGNIFICANT CHANGE UP (ref 22–31)
CREAT SERPL-MCNC: 0.95 MG/DL — SIGNIFICANT CHANGE UP (ref 0.5–1.3)
EOSINOPHIL # BLD AUTO: 0.62 K/UL — HIGH (ref 0–0.5)
EOSINOPHIL NFR BLD AUTO: 6 % — SIGNIFICANT CHANGE UP (ref 0–6)
GLUCOSE SERPL-MCNC: 78 MG/DL — SIGNIFICANT CHANGE UP (ref 70–99)
HCT VFR BLD CALC: 34.7 % — LOW (ref 39–50)
HGB BLD-MCNC: 12.6 G/DL — LOW (ref 13–17)
IANC: 5.19 K/UL — SIGNIFICANT CHANGE UP (ref 1.5–8.5)
IMM GRANULOCYTES NFR BLD AUTO: 0.4 % — SIGNIFICANT CHANGE UP (ref 0–1.5)
LYMPHOCYTES # BLD AUTO: 3.49 K/UL — HIGH (ref 1–3.3)
LYMPHOCYTES # BLD AUTO: 33.6 % — SIGNIFICANT CHANGE UP (ref 13–44)
MAGNESIUM SERPL-MCNC: 2.2 MG/DL — SIGNIFICANT CHANGE UP (ref 1.6–2.6)
MCHC RBC-ENTMCNC: 30 PG — SIGNIFICANT CHANGE UP (ref 27–34)
MCHC RBC-ENTMCNC: 36.3 GM/DL — HIGH (ref 32–36)
MCV RBC AUTO: 82.6 FL — SIGNIFICANT CHANGE UP (ref 80–100)
MONOCYTES # BLD AUTO: 0.98 K/UL — HIGH (ref 0–0.9)
MONOCYTES NFR BLD AUTO: 9.4 % — SIGNIFICANT CHANGE UP (ref 2–14)
NEUTROPHILS # BLD AUTO: 5.19 K/UL — SIGNIFICANT CHANGE UP (ref 1.8–7.4)
NEUTROPHILS NFR BLD AUTO: 49.8 % — SIGNIFICANT CHANGE UP (ref 43–77)
NRBC # BLD: 2 /100 WBCS — SIGNIFICANT CHANGE UP
NRBC # FLD: 0.22 K/UL — HIGH
PHOSPHATE SERPL-MCNC: 4.2 MG/DL — SIGNIFICANT CHANGE UP (ref 2.5–4.5)
PLATELET # BLD AUTO: 351 K/UL — SIGNIFICANT CHANGE UP (ref 150–400)
POTASSIUM SERPL-MCNC: 3.9 MMOL/L — SIGNIFICANT CHANGE UP (ref 3.5–5.3)
POTASSIUM SERPL-SCNC: 3.9 MMOL/L — SIGNIFICANT CHANGE UP (ref 3.5–5.3)
RBC # BLD: 4.2 M/UL — SIGNIFICANT CHANGE UP (ref 4.2–5.8)
RBC # FLD: 18.4 % — HIGH (ref 10.3–14.5)
SODIUM SERPL-SCNC: 140 MMOL/L — SIGNIFICANT CHANGE UP (ref 135–145)
WBC # BLD: 10.4 K/UL — SIGNIFICANT CHANGE UP (ref 3.8–10.5)
WBC # FLD AUTO: 10.4 K/UL — SIGNIFICANT CHANGE UP (ref 3.8–10.5)

## 2021-05-02 PROCEDURE — 99233 SBSQ HOSP IP/OBS HIGH 50: CPT | Mod: GC

## 2021-05-02 PROCEDURE — 93306 TTE W/DOPPLER COMPLETE: CPT | Mod: 26

## 2021-05-02 RX ADMIN — APIXABAN 10 MILLIGRAM(S): 2.5 TABLET, FILM COATED ORAL at 18:16

## 2021-05-02 RX ADMIN — APIXABAN 10 MILLIGRAM(S): 2.5 TABLET, FILM COATED ORAL at 04:07

## 2021-05-02 NOTE — PROGRESS NOTE ADULT - PROBLEM SELECTOR PLAN 1
Second unprovoked PE; first in Feb 2020, but pt no longer taking eliquis (pt reports he stopped because he "completed his course" in Jan 2021)  - s/p ASA and hep gtt started in ED  - can consider hypercoag panel for etiology  - UA negative  - transition to DOAC today  - f/u TTE to re-evaluate right side of heart Second unprovoked PE; first in Feb 2020, but pt no longer taking eliquis (pt reports he stopped because he "completed his course" in Jan 2021)  - s/p ASA and hep gtt started in ED  - can consider hypercoag panel for etiology  - UA negative  - transitioned to DOAC  - f/u TTE to re-evaluate right side of heart  - Pt desats to 88% on RA; cont to wean down as appropriate, may need home oxygen on discharge

## 2021-05-02 NOTE — PROGRESS NOTE ADULT - SUBJECTIVE AND OBJECTIVE BOX
Octaviano Cheng MD  PGY-1, Internal Medicine  Pager #: [phone], [phone]    Patient is a 51y old  Male who presents with a chief complaint of PE (01 May 2021 14:12)    OVERNIGHT/INTERVAL EVENTS:    SUBJECTIVE: Patient seen and examined bedside.     Denies fevers/chills, headaches/dizziness, nausea/vomiting, chest pain, SOB, abdominal pain.     MEDICATIONS  (STANDING):  apixaban 10 milliGRAM(s) Oral every 12 hours    MEDICATIONS  (PRN):  acetaminophen   Tablet .. 650 milliGRAM(s) Oral every 6 hours PRN Mild Pain (1 - 3), Moderate Pain (4 - 6)      CAPILLARY BLOOD GLUCOSE        I&O's Summary    01 May 2021 07:01  -  02 May 2021 07:00  --------------------------------------------------------  IN: 240 mL / OUT: 800 mL / NET: -560 mL        PHYSICAL EXAM:  Vital Signs Last 24 Hrs  T(C): 36.7 (02 May 2021 04:02), Max: 36.9 (01 May 2021 11:03)  T(F): 98 (02 May 2021 04:02), Max: 98.4 (01 May 2021 11:03)  HR: 80 (02 May 2021 04:02) (80 - 85)  BP: 114/68 (02 May 2021 04:02) (108/71 - 114/68)  BP(mean): --  RR: 17 (02 May 2021 04:02) (17 - 18)  SpO2: 98% (02 May 2021 04:02) (98% - 98%)  CONSTITUTIONAL: NAD, lying in bed comfortably  EYES: EOMI, PERRLA; conjunctiva and sclera clear  ENMT: Moist oral mucosa; normal dentition  NECK: Supple, no palpable masses, no JVD  RESPIRATORY: Lungs clear to ascultation b/l; no rales/ronchi/wheezing; unlabored respirations  CARDIOVASCULAR: Regular rate and rhythm, normal S1 and S2, no murmurs/rubs/gallops  ABDOMEN: Soft, normal bowel sounds, nondistended, nontender  MUSCULOSKELETAL: No joint swelling or tenderness to palpation  PSYCH: Affect appropriate  NEUROLOGY: AAOx3, CNs grossly intact  SKIN: No rashes; no palpable lesions    LABS:                        12.6   10.40 )-----------( 351      ( 02 May 2021 06:31 )             34.7     05-    144  |  109<H>  |  8   ----------------------------<  86  4.2   |  25  |  1.00    Ca    8.7      01 May 2021 07:32  Phos  4.0     05-  Mg     2.2     05-    TPro  6.7  /  Alb  3.8  /  TBili  1.2  /  DBili  x   /  AST  17  /  ALT  8   /  AlkPhos  72  05-    PT/INR - ( 2021 13:56 )   PT: 13.3 sec;   INR: 1.18 ratio         PTT - ( 01 May 2021 07:32 )  PTT:101.3 sec      Urinalysis Basic - ( 01 May 2021 07:33 )    Color: Yellow / Appearance: Clear / S.032 / pH: x  Gluc: x / Ketone: Negative  / Bili: Negative / Urobili: 3 mg/dL   Blood: x / Protein: Trace / Nitrite: Negative   Leuk Esterase: Negative / RBC: 1 /HPF / WBC 2 /HPF   Sq Epi: x / Non Sq Epi: 0 /HPF / Bacteria: Negative          INTERVAL RADIOLOGY/IMAGING STUDIES:     Ocatviano Cheng MD  PGY-1, Internal Medicine  Pager #: [phone], [phone]    Patient is a 51y old  Male who presents with a chief complaint of PE (01 May 2021 14:12)    OVERNIGHT/INTERVAL EVENTS: No overnight events.    SUBJECTIVE: Patient seen and examined bedside. No acute complaints.      Denies fevers/chills, headaches/dizziness, nausea/vomiting, chest pain, SOB, abdominal pain.     MEDICATIONS  (STANDING):  apixaban 10 milliGRAM(s) Oral every 12 hours    MEDICATIONS  (PRN):  acetaminophen   Tablet .. 650 milliGRAM(s) Oral every 6 hours PRN Mild Pain (1 - 3), Moderate Pain (4 - 6)      CAPILLARY BLOOD GLUCOSE        I&O's Summary    01 May 2021 07:01  -  02 May 2021 07:00  --------------------------------------------------------  IN: 240 mL / OUT: 800 mL / NET: -560 mL        PHYSICAL EXAM:  Vital Signs Last 24 Hrs  T(C): 36.7 (02 May 2021 04:02), Max: 36.9 (01 May 2021 11:03)  T(F): 98 (02 May 2021 04:02), Max: 98.4 (01 May 2021 11:03)  HR: 80 (02 May 2021 04:02) (80 - 85)  BP: 114/68 (02 May 2021 04:02) (108/71 - 114/68)  BP(mean): --  RR: 17 (02 May 2021 04:02) (17 - 18)  SpO2: 98% (02 May 2021 04:02) (98% - 98%)    CONSTITUTIONAL: NAD, lying in bed comfortably, on 2L NC  EYES: EOMI, PERRLA; conjunctiva and sclera clear  ENMT: Moist oral mucosa; normal dentition  NECK: Supple, no palpable masses, no JVD  RESPIRATORY: Lungs clear to ascultation b/l; no rales/ronchi/wheezing; unlabored respirations  CARDIOVASCULAR: Regular rate and rhythm, normal S1 and S2, no murmurs/rubs/gallops  ABDOMEN: Soft, normal bowel sounds, nondistended, nontender  MUSCULOSKELETAL: No joint swelling or tenderness to palpation  PSYCH: Affect appropriate  NEUROLOGY: AAOx3, CNs grossly intact  SKIN: No rashes; no palpable lesions    LABS:                        12.6   10.40 )-----------( 351      ( 02 May 2021 06:31 )             34.7     05-    144  |  109<H>  |  8   ----------------------------<  86  4.2   |  25  |  1.00    Ca    8.7      01 May 2021 07:32  Phos  4.0     05-  Mg     2.2     05-    TPro  6.7  /  Alb  3.8  /  TBili  1.2  /  DBili  x   /  AST  17  /  ALT  8   /  AlkPhos  72  05-    PT/INR - ( 2021 13:56 )   PT: 13.3 sec;   INR: 1.18 ratio         PTT - ( 01 May 2021 07:32 )  PTT:101.3 sec      Urinalysis Basic - ( 01 May 2021 07:33 )    Color: Yellow / Appearance: Clear / S.032 / pH: x  Gluc: x / Ketone: Negative  / Bili: Negative / Urobili: 3 mg/dL   Blood: x / Protein: Trace / Nitrite: Negative   Leuk Esterase: Negative / RBC: 1 /HPF / WBC 2 /HPF   Sq Epi: x / Non Sq Epi: 0 /HPF / Bacteria: Negative          INTERVAL RADIOLOGY/IMAGING STUDIES:

## 2021-05-02 NOTE — PROGRESS NOTE ADULT - PROBLEM SELECTOR PLAN 3
- will obtain more collateral in AM, no current symptoms of crisis  - trend CBC.  - no signs of SC crisis on admission

## 2021-05-03 ENCOUNTER — TRANSCRIPTION ENCOUNTER (OUTPATIENT)
Age: 51
End: 2021-05-03

## 2021-05-03 VITALS
TEMPERATURE: 99 F | DIASTOLIC BLOOD PRESSURE: 59 MMHG | OXYGEN SATURATION: 97 % | SYSTOLIC BLOOD PRESSURE: 120 MMHG | RESPIRATION RATE: 18 BRPM | HEART RATE: 85 BPM

## 2021-05-03 LAB
ANION GAP SERPL CALC-SCNC: 9 MMOL/L — SIGNIFICANT CHANGE UP (ref 7–14)
BUN SERPL-MCNC: 7 MG/DL — SIGNIFICANT CHANGE UP (ref 7–23)
CALCIUM SERPL-MCNC: 8.7 MG/DL — SIGNIFICANT CHANGE UP (ref 8.4–10.5)
CHLORIDE SERPL-SCNC: 106 MMOL/L — SIGNIFICANT CHANGE UP (ref 98–107)
CO2 SERPL-SCNC: 23 MMOL/L — SIGNIFICANT CHANGE UP (ref 22–31)
CREAT SERPL-MCNC: 0.96 MG/DL — SIGNIFICANT CHANGE UP (ref 0.5–1.3)
GLUCOSE SERPL-MCNC: 75 MG/DL — SIGNIFICANT CHANGE UP (ref 70–99)
HCT VFR BLD CALC: 34.9 % — LOW (ref 39–50)
HGB BLD-MCNC: 12.7 G/DL — LOW (ref 13–17)
MAGNESIUM SERPL-MCNC: 2.2 MG/DL — SIGNIFICANT CHANGE UP (ref 1.6–2.6)
MCHC RBC-ENTMCNC: 30 PG — SIGNIFICANT CHANGE UP (ref 27–34)
MCHC RBC-ENTMCNC: 36.4 GM/DL — HIGH (ref 32–36)
MCV RBC AUTO: 82.3 FL — SIGNIFICANT CHANGE UP (ref 80–100)
NRBC # BLD: 2 /100 WBCS — SIGNIFICANT CHANGE UP
NRBC # FLD: 0.23 K/UL — HIGH
PHOSPHATE SERPL-MCNC: 4.1 MG/DL — SIGNIFICANT CHANGE UP (ref 2.5–4.5)
PLATELET # BLD AUTO: 371 K/UL — SIGNIFICANT CHANGE UP (ref 150–400)
POTASSIUM SERPL-MCNC: 4.3 MMOL/L — SIGNIFICANT CHANGE UP (ref 3.5–5.3)
POTASSIUM SERPL-SCNC: 4.3 MMOL/L — SIGNIFICANT CHANGE UP (ref 3.5–5.3)
RBC # BLD: 4.24 M/UL — SIGNIFICANT CHANGE UP (ref 4.2–5.8)
RBC # FLD: 18 % — HIGH (ref 10.3–14.5)
SODIUM SERPL-SCNC: 138 MMOL/L — SIGNIFICANT CHANGE UP (ref 135–145)
WBC # BLD: 10.33 K/UL — SIGNIFICANT CHANGE UP (ref 3.8–10.5)
WBC # FLD AUTO: 10.33 K/UL — SIGNIFICANT CHANGE UP (ref 3.8–10.5)

## 2021-05-03 PROCEDURE — 99239 HOSP IP/OBS DSCHRG MGMT >30: CPT | Mod: GC

## 2021-05-03 RX ORDER — APIXABAN 2.5 MG/1
1 TABLET, FILM COATED ORAL
Qty: 72 | Refills: 0
Start: 2021-05-03 | End: 2021-06-01

## 2021-05-03 RX ADMIN — APIXABAN 10 MILLIGRAM(S): 2.5 TABLET, FILM COATED ORAL at 17:55

## 2021-05-03 RX ADMIN — APIXABAN 10 MILLIGRAM(S): 2.5 TABLET, FILM COATED ORAL at 05:44

## 2021-05-03 NOTE — DISCHARGE NOTE NURSING/CASE MANAGEMENT/SOCIAL WORK - PATIENT PORTAL LINK FT
You can access the FollowMyHealth Patient Portal offered by [facility] by registering at the following website: [email or web address]. By joining [email or web address]’s FollowMyHealth portal, you will also be able to view your health information using other applications (apps) compatible with our system.

## 2021-05-03 NOTE — PROVIDER CONTACT NOTE (OTHER) - BACKGROUND
Patient admitted 4/30 for chest pain. Patient possible d/c candidate today pending echo and 02 monitoring
Pt admitted for chest pain.
(Admit Diagnosis) Chest pain  (PMH) H/O sickle cell trait  (PMH) Bilateral pulmonary embolism  (PMH) Myocardial infarct

## 2021-05-03 NOTE — PROGRESS NOTE ADULT - PROBLEM SELECTOR PLAN 4
- DVT ppx: hep gtt  - Diet: DASH/TLC, no meat, only fish - DVT ppx: [id number] BID  - Diet: DASH/TLC, no meat, only fish

## 2021-05-03 NOTE — PROGRESS NOTE ADULT - SUBJECTIVE AND OBJECTIVE BOX
PROGRESS NOTE:   Written by Luan Leonard. [facility] page number: [id number]. [place] page number [phone]    Patient is a 51y old  Male who presents with a chief complaint of PE (02 May 2021 07:07)      SUBJECTIVE / OVERNIGHT EVENTS: Overnight, patient desats to 88%, and NC increased to 4L with stable sats>95%    ADDITIONAL REVIEW OF SYSTEMS:    MEDICATIONS  (STANDING):  apixaban 10 milliGRAM(s) Oral every 12 hours    MEDICATIONS  (PRN):  acetaminophen   Tablet .. 650 milliGRAM(s) Oral every 6 hours PRN Mild Pain (1 - 3), Moderate Pain (4 - 6)      CAPILLARY BLOOD GLUCOSE        I&O's Summary      PHYSICAL EXAM:  Vital Signs Last 24 Hrs  T(C): 36.4 (03 May 2021 05:42), Max: 36.8 (02 May 2021 10:55)  T(F): 97.6 (03 May 2021 05:42), Max: 98.3 (02 May 2021 10:55)  HR: 83 (03 May 2021 05:42) (78 - 85)  BP: 112/76 (03 May 2021 05:42) (110/78 - 117/83)  BP(mean): --  RR: 18 (03 May 2021 05:42) (17 - 19)  SpO2: 97% (03 May 2021 05:42) (88% - 100%)    GENERAL: No acute distress, well-developed  HEAD:  Atraumatic, Normocephalic  EYES: EOMI, PERRLA, conjunctiva and sclera clear  NECK: Supple, no lymphadenopathy, no JVD  CHEST/LUNG: CTAB; No wheezes, rales, or rhonchi  HEART: Regular rate and rhythm; No murmurs, rubs, or gallops  ABDOMEN: Soft, non-tender, non-distended; normal bowel sounds, no organomegaly  EXTREMITIES:  2+ peripheral pulses b/l, No clubbing, cyanosis, or edema  NEUROLOGY: A&O x 3, no focal deficits  SKIN: No rashes or lesions    LABS:                        12.6   10.40 )-----------( 351      ( 02 May 2021 06:31 )             34.7     05-02    140  |  107  |  7   ----------------------------<  78  3.9   |  23  |  0.95    Ca    8.5      02 May 2021 06:31  Phos  4.2     05-  Mg     2.2     05-    TPro  6.7  /  Alb  3.8  /  TBili  1.2  /  DBili  x   /  AST  17  /  ALT  8   /  AlkPhos  72  05-    PTT - ( 01 May 2021 07:32 )  PTT:101.3 sec      Urinalysis Basic - ( 01 May 2021 07:33 )    Color: Yellow / Appearance: Clear / S.032 / pH: x  Gluc: x / Ketone: Negative  / Bili: Negative / Urobili: 3 mg/dL   Blood: x / Protein: Trace / Nitrite: Negative   Leuk Esterase: Negative / RBC: 1 /HPF / WBC 2 /HPF   Sq Epi: x / Non Sq Epi: 0 /HPF / Bacteria: Negative          RADIOLOGY & ADDITIONAL TESTS:  Results Reviewed:   Imaging Personally Reviewed:  Electrocardiogram Personally Reviewed:    COORDINATION OF CARE:  Care Discussed with Consultants/Other Providers [Y/N]:  Prior or Outpatient Records Reviewed [Y/N]:   PROGRESS NOTE:   Written by Luan Leonard. [facility] page number: [id number]. [place] page number [phone]    Patient is a 51y old  Male who presents with a chief complaint of PE (02 May 2021 07:07)      SUBJECTIVE / OVERNIGHT EVENTS: Overnight, patient desats to 88%, and NC increased to 4L with stable sats>95%    ADDITIONAL REVIEW OF SYSTEMS:    MEDICATIONS  (STANDING):   apixaban 10 milliGRAM(s) Oral every 12 hours    MEDICATIONS  (PRN):  acetaminophen   Tablet .. 650 milliGRAM(s) Oral every 6 hours PRN Mild Pain (1 - 3), Moderate Pain (4 - 6)      CAPILLARY BLOOD GLUCOSE        I&O's Summary      PHYSICAL EXAM:  Vital Signs Last 24 Hrs  T(C): 36.4 (03 May 2021 05:42), Max: 36.8 (02 May 2021 10:55)  T(F): 97.6 (03 May 2021 05:42), Max: 98.3 (02 May 2021 10:55)  HR: 83 (03 May 2021 05:42) (78 - 85)  BP: 112/76 (03 May 2021 05:42) (110/78 - 117/83)  BP(mean): --  RR: 18 (03 May 2021 05:42) (17 - 19)  SpO2: 97% (03 May 2021 05:42) (88% - 100%)    GENERAL: No acute distress, well-developed  HEAD:  Atraumatic, Normocephalic  EYES: EOMI, PERRLA, conjunctiva and sclera clear  NECK: Supple, no lymphadenopathy, no JVD  CHEST/LUNG: CTAB; No wheezes, rales, or rhonchi  HEART: Regular rate and rhythm; No murmurs, rubs, or gallops  ABDOMEN: Soft, non-tender, non-distended; normal bowel sounds, no organomegaly  EXTREMITIES:  2+ peripheral pulses b/l, No clubbing, cyanosis, or edema  NEUROLOGY: A&O x 3, no focal deficits  SKIN: No rashes or lesions    LABS:                        12.6   10.40 )-----------( 351      ( 02 May 2021 06:31 )             34.7     05-02    140  |  107  |  7   ----------------------------<  78  3.9   |  23  |  0.95    Ca    8.5      02 May 2021 06:31  Phos  4.2     05-  Mg     2.2     05-    TPro  6.7  /  Alb  3.8  /  TBili  1.2  /  DBili  x   /  AST  17  /  ALT  8   /  AlkPhos  72  05-    PTT - ( 01 May 2021 07:32 )  PTT:101.3 sec      Urinalysis Basic - ( 01 May 2021 07:33 )    Color: Yellow / Appearance: Clear / S.032 / pH: x  Gluc: x / Ketone: Negative  / Bili: Negative / Urobili: 3 mg/dL   Blood: x / Protein: Trace / Nitrite: Negative   Leuk Esterase: Negative / RBC: 1 /HPF / WBC 2 /HPF   Sq Epi: x / Non Sq Epi: 0 /HPF / Bacteria: Negative          RADIOLOGY & ADDITIONAL TESTS:  Results Reviewed:   Imaging Personally Reviewed:  Electrocardiogram Personally Reviewed:    COORDINATION OF CARE:  Care Discussed with Consultants/Other Providers [Y/N]:  Prior or Outpatient Records Reviewed [Y/N]:   PROGRESS NOTE:   Written by Luan Leonard. [facility] page number: [id number]. [place] page number [phone]    Patient is a 51y old  Male who presents with a chief complaint of PE (02 May 2021 07:07)      SUBJECTIVE / OVERNIGHT EVENTS: Overnight, patient desats to 88%, and NC increased to 4L with stable sats>95%. Patient reports he feels well and denies any shortness of breath overnight despite desaturation. Patient denies chest pain, shortness of breath, abdominal pain, fevers, chills, constipation, diarrhea, leg swelling or pain.    MEDICATIONS  (STANDING):   apixaban 10 milliGRAM(s) Oral every 12 hours    MEDICATIONS  (PRN):  acetaminophen   Tablet .. 650 milliGRAM(s) Oral every 6 hours PRN Mild Pain (1 - 3), Moderate Pain (4 - 6)      CAPILLARY BLOOD GLUCOSE        I&O's Summary      PHYSICAL EXAM:  Vital Signs Last 24 Hrs  T(C): 36.4 (03 May 2021 05:42), Max: 36.8 (02 May 2021 10:55)  T(F): 97.6 (03 May 2021 05:42), Max: 98.3 (02 May 2021 10:55)  HR: 83 (03 May 2021 05:42) (78 - 85)  BP: 112/76 (03 May 2021 05:42) (110/78 - 117/83)  BP(mean): --  RR: 18 (03 May 2021 05:42) (17 - 19)  SpO2: 97% (03 May 2021 05:42) (88% - 100%)    GENERAL: No acute distress, resting in bed  HEAD:  Atraumatic, Normocephalic  EYES: EOMI, PERRLA, conjunctiva and sclera clear  NECK: Supple, no lymphadenopathy, no JVD  CHEST/LUNG: CTAB; No wheezes, rales, or rhonchi  HEART: Regular rate and rhythm; No murmurs, rubs, or gallops  ABDOMEN: Soft, non-tender, non-distended; normal bowel sounds, no organomegaly  EXTREMITIES:  2+ peripheral pulses b/l, No clubbing, cyanosis, or edema  NEUROLOGY: A&O x 3, no focal deficits  SKIN: No rashes or lesions    LABS:                        12.6   10.40 )-----------( 351      ( 02 May 2021 06:31 )             34.7     05-    140  |  107  |  7   ----------------------------<  78  3.9   |  23  |  0.95    Ca    8.5      02 May 2021 06:31  Phos  4.2     05-  Mg     2.2     05-    TPro  6.7  /  Alb  3.8  /  TBili  1.2  /  DBili  x   /  AST  17  /  ALT  8   /  AlkPhos  72  05-    PTT - ( 01 May 2021 07:32 )  PTT:101.3 sec      Urinalysis Basic - ( 01 May 2021 07:33 )    Color: Yellow / Appearance: Clear / S.032 / pH: x  Gluc: x / Ketone: Negative  / Bili: Negative / Urobili: 3 mg/dL   Blood: x / Protein: Trace / Nitrite: Negative   Leuk Esterase: Negative / RBC: 1 /HPF / WBC 2 /HPF   Sq Epi: x / Non Sq Epi: 0 /HPF / Bacteria: Negative          RADIOLOGY & ADDITIONAL TESTS:  Results Reviewed:   Imaging Personally Reviewed:  Electrocardiogram Personally Reviewed:    COORDINATION OF CARE:  Care Discussed with Consultants/Other Providers [Y/N]:  Prior or Outpatient Records Reviewed [Y/N]:

## 2021-05-03 NOTE — PROVIDER CONTACT NOTE (OTHER) - ASSESSMENT
Patient desating while sleeping. 02 titrated up. now 95% on 4L NC
Patient a&Ox4, denies sob on RA
/59 HR 85; pt asymptomatic.

## 2021-05-03 NOTE — PROVIDER CONTACT NOTE (OTHER) - SITUATION
Patient titrated down to 1L NC 5/2 dayshift. Patient desated to 88%-90% while sleeping. Titrated 02 to 4L NC, patient sating 95%.
Low diastolic BP
Patient 02 on RA 88%

## 2021-05-03 NOTE — PROGRESS NOTE ADULT - PROBLEM SELECTOR PLAN 2
- previous MI last year per patient, not on any meds  - no signs of ACS at present.  - f/u TTE - previous MI last year per patient, not on any meds  - no signs of ACS at present.  - TTE as above

## 2021-05-03 NOTE — PROVIDER CONTACT NOTE (OTHER) - ACTION/TREATMENT ORDERED:
Team 1 MD Octaviano Cheng paged and made aware, will continue to monitor 02
Will continue to monitor BP.

## 2021-05-03 NOTE — PROGRESS NOTE ADULT - PROBLEM SELECTOR PLAN 1
Second unprovoked PE; first in Feb 2020, but pt no longer taking eliquis (pt reports he stopped because he "completed his course" in Jan 2021)  - s/p ASA and hep gtt started in ED  - can consider hypercoag panel for etiology  - UA negative  - transitioned to DOAC  - f/u TTE to re-evaluate right side of heart  - Pt desats to 88% on RA; cont to wean down as appropriate, may need home oxygen on discharge Second unprovoked PE; first in Feb 2020, but pt no longer taking eliquis (pt reports he stopped because he "completed his course" in Jan 2021)  - Pt desats to 88% on RA; NC increased to 4L, plan to wean down to 2L today as tolerated, and will check ambulatory O2 sats to assess need for home O2  - s/p ASA and hep gtt started in ED  - can consider hypercoag panel for etiology  - UA negative  - transitioned to DOAC  - TTE shows flattening of interventricular septum during systole and diastole, suggestive of RV pressure overload, mild pulmonary HTN, no significant changes from prior TTE (Feb 2020)  -plan for Pulmonology f/u after discharge Second unprovoked PE; first in Feb 2020, but pt no longer taking eliquis (pt reports he stopped because he "completed his course" in Jan 2021)  - Pt desats to 88% on RA; NC increased to 4L, plan to wean down to 2L today as tolerated, and will check ambulatory O2 sats to assess need for home O2  - UA negative  - transitioned to DOAC  - TTE shows flattening of interventricular septum during systole and diastole, suggestive of RV pressure overload, mild pulmonary HTN, no significant changes from prior TTE (Feb 2020)  -plan for Pulmonology f/u after discharge

## 2021-05-03 NOTE — PROGRESS NOTE ADULT - ATTENDING COMMENTS
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    # PE - Transition to Eliquis today. Likely will need lifelong AC. Patient had hypercoag w/u in past with heme, can to f/u with them as OP. TTE pending.  # Acute hypoxemic respiratory failure- currently on 2L, dropped to 88% on RA this morning.
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    # PE - Tolerating elquis. Likely will need lifelong AC given that this is second unprovoked VTE. Patient had hypercoag w/u in past with heme, can to f/u with them as OP. TTE showing RV dysfunction and elevated pulmonary pressures, but unchanged with imaging seen in 2/2020.   # Acute hypoxemic respiratory failure- currently on 2L, dropped to 88% on RA. Attempted to wean to 1L but did not tolerate. Saturations in mid 90s on 2L at rest. Will check ambulatory saturations and prepare for d/c with home O2 and pulmonary follow up.   d/c planning 41 min
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    Currently on Heparin gtt, will plan for transition back to [facility]. Likely will need lifelong AC. Patient had hypercoag w/u in past with heme.   TTE pending.

## 2021-05-05 PROBLEM — I21.9 ACUTE MYOCARDIAL INFARCTION, UNSPECIFIED: Chronic | Status: ACTIVE | Noted: 2021-04-30

## 2021-05-05 PROBLEM — I26.99 OTHER PULMONARY EMBOLISM WITHOUT ACUTE COR PULMONALE: Chronic | Status: ACTIVE | Noted: 2021-05-01

## 2021-05-05 PROBLEM — Z86.2 PERSONAL HISTORY OF DISEASES OF THE BLOOD AND BLOOD-FORMING ORGANS AND CERTAIN DISORDERS INVOLVING THE IMMUNE MECHANISM: Chronic | Status: ACTIVE | Noted: 2021-05-01

## 2021-05-26 ENCOUNTER — OUTPATIENT (OUTPATIENT)
Dept: OUTPATIENT SERVICES | Facility: HOSPITAL | Age: 51
LOS: 1 days | Discharge: HOME | End: 2021-05-26

## 2021-05-26 DIAGNOSIS — Z98.890 OTHER SPECIFIED POSTPROCEDURAL STATES: Chronic | ICD-10-CM

## 2021-05-26 DIAGNOSIS — D72.829 ELEVATED WHITE BLOOD CELL COUNT, UNSPECIFIED: ICD-10-CM

## 2021-05-27 ENCOUNTER — RESULT REVIEW (OUTPATIENT)
Age: 51
End: 2021-05-27

## 2021-05-27 ENCOUNTER — APPOINTMENT (OUTPATIENT)
Dept: HEMATOLOGY ONCOLOGY | Facility: CLINIC | Age: 51
End: 2021-05-27
Payer: MEDICAID

## 2021-05-27 VITALS
SYSTOLIC BLOOD PRESSURE: 110 MMHG | OXYGEN SATURATION: 97 % | HEART RATE: 91 BPM | DIASTOLIC BLOOD PRESSURE: 72 MMHG | RESPIRATION RATE: 14 BRPM | TEMPERATURE: 97.4 F | BODY MASS INDEX: 25.59 KG/M2 | WEIGHT: 182.76 LBS | HEIGHT: 71.06 IN

## 2021-05-27 DIAGNOSIS — Z83.2 FAMILY HISTORY OF DISEASES OF THE BLOOD AND BLOOD-FORMING ORGANS AND CERTAIN DISORDERS INVOLVING THE IMMUNE MECHANISM: ICD-10-CM

## 2021-05-27 DIAGNOSIS — D72.821 MONOCYTOSIS (SYMPTOMATIC): ICD-10-CM

## 2021-05-27 DIAGNOSIS — D72.829 ELEVATED WHITE BLOOD CELL COUNT, UNSPECIFIED: ICD-10-CM

## 2021-05-27 DIAGNOSIS — Z63.5 DISRUPTION OF FAMILY BY SEPARATION AND DIVORCE: ICD-10-CM

## 2021-05-27 LAB
BASOPHILS # BLD AUTO: 0.09 K/UL — SIGNIFICANT CHANGE UP (ref 0–0.2)
BASOPHILS NFR BLD AUTO: 1 % — SIGNIFICANT CHANGE UP (ref 0–2)
EOSINOPHIL # BLD AUTO: 0.25 K/UL — SIGNIFICANT CHANGE UP (ref 0–0.5)
EOSINOPHIL NFR BLD AUTO: 2.7 % — SIGNIFICANT CHANGE UP (ref 0–6)
HCT VFR BLD CALC: 39.1 % — SIGNIFICANT CHANGE UP (ref 39–50)
HGB BLD-MCNC: 14.4 G/DL — SIGNIFICANT CHANGE UP (ref 13–17)
IMM GRANULOCYTES NFR BLD AUTO: 0.3 % — SIGNIFICANT CHANGE UP (ref 0–1.5)
LYMPHOCYTES # BLD AUTO: 3.02 K/UL — SIGNIFICANT CHANGE UP (ref 1–3.3)
LYMPHOCYTES # BLD AUTO: 32.6 % — SIGNIFICANT CHANGE UP (ref 13–44)
MCHC RBC-ENTMCNC: 30.3 PG — SIGNIFICANT CHANGE UP (ref 27–34)
MCHC RBC-ENTMCNC: 36.8 G/DL — HIGH (ref 32–36)
MCV RBC AUTO: 82.1 FL — SIGNIFICANT CHANGE UP (ref 80–100)
MONOCYTES # BLD AUTO: 0.77 K/UL — SIGNIFICANT CHANGE UP (ref 0–0.9)
MONOCYTES NFR BLD AUTO: 8.3 % — SIGNIFICANT CHANGE UP (ref 2–14)
NEUTROPHILS # BLD AUTO: 5.09 K/UL — SIGNIFICANT CHANGE UP (ref 1.8–7.4)
NEUTROPHILS NFR BLD AUTO: 55.1 % — SIGNIFICANT CHANGE UP (ref 43–77)
NRBC # BLD: 0 /100 WBCS — SIGNIFICANT CHANGE UP (ref 0–0)
PLATELET # BLD AUTO: 385 K/UL — SIGNIFICANT CHANGE UP (ref 150–400)
RBC # BLD: 4.76 M/UL — SIGNIFICANT CHANGE UP (ref 4.2–5.8)
RBC # FLD: 15.9 % — HIGH (ref 10.3–14.5)
RETICS #: 168 K/UL — HIGH (ref 25–125)
RETICS/RBC NFR: 3.5 % — HIGH (ref 0.5–2.5)
WBC # BLD: 9.25 K/UL — SIGNIFICANT CHANGE UP (ref 3.8–10.5)
WBC # FLD AUTO: 9.25 K/UL — SIGNIFICANT CHANGE UP (ref 3.8–10.5)

## 2021-05-27 PROCEDURE — 99215 OFFICE O/P EST HI 40 MIN: CPT

## 2021-05-27 SDOH — SOCIAL STABILITY - SOCIAL INSECURITY: DISRUPTION OF FAMILY BY SEPARATION AND DIVORCE: Z63.5

## 2021-05-28 LAB
ALBUMIN SERPL ELPH-MCNC: 4.2 G/DL
ALP BLD-CCNC: 82 U/L
ALT SERPL-CCNC: 19 U/L
ANION GAP SERPL CALC-SCNC: 11 MMOL/L
AST SERPL-CCNC: 23 U/L
BILIRUB SERPL-MCNC: 0.9 MG/DL
BUN SERPL-MCNC: 4 MG/DL
CALCIUM SERPL-MCNC: 9.1 MG/DL
CHLORIDE SERPL-SCNC: 103 MMOL/L
CO2 SERPL-SCNC: 25 MMOL/L
CREAT SERPL-MCNC: 0.93 MG/DL
GLUCOSE SERPL-MCNC: 162 MG/DL
POTASSIUM SERPL-SCNC: 3.6 MMOL/L
PROT SERPL-MCNC: 7.1 G/DL
SODIUM SERPL-SCNC: 139 MMOL/L

## 2021-05-29 PROBLEM — D72.829 LEUKOCYTOSIS, UNSPECIFIED TYPE: Status: ACTIVE | Noted: 2021-05-29

## 2021-05-29 PROBLEM — D72.821 MONOCYTOSIS: Status: ACTIVE | Noted: 2021-05-29

## 2021-07-02 ENCOUNTER — INPATIENT (INPATIENT)
Facility: HOSPITAL | Age: 51
LOS: 5 days | Discharge: HOME | End: 2021-07-08
Attending: HOSPITALIST | Admitting: HOSPITALIST
Payer: MEDICAID

## 2021-07-02 VITALS
DIASTOLIC BLOOD PRESSURE: 62 MMHG | TEMPERATURE: 98 F | SYSTOLIC BLOOD PRESSURE: 122 MMHG | HEIGHT: 72 IN | RESPIRATION RATE: 18 BRPM | OXYGEN SATURATION: 82 % | WEIGHT: 182.98 LBS | HEART RATE: 142 BPM

## 2021-07-02 DIAGNOSIS — R77.8 OTHER SPECIFIED ABNORMALITIES OF PLASMA PROTEINS: ICD-10-CM

## 2021-07-02 DIAGNOSIS — I26.99 OTHER PULMONARY EMBOLISM WITHOUT ACUTE COR PULMONALE: ICD-10-CM

## 2021-07-02 DIAGNOSIS — Z29.9 ENCOUNTER FOR PROPHYLACTIC MEASURES, UNSPECIFIED: ICD-10-CM

## 2021-07-02 DIAGNOSIS — N17.9 ACUTE KIDNEY FAILURE, UNSPECIFIED: ICD-10-CM

## 2021-07-02 DIAGNOSIS — E87.5 HYPERKALEMIA: ICD-10-CM

## 2021-07-02 DIAGNOSIS — Z98.890 OTHER SPECIFIED POSTPROCEDURAL STATES: Chronic | ICD-10-CM

## 2021-07-02 DIAGNOSIS — J96.21 ACUTE AND CHRONIC RESPIRATORY FAILURE WITH HYPOXIA: ICD-10-CM

## 2021-07-02 LAB
ALBUMIN SERPL ELPH-MCNC: 4.3 G/DL — SIGNIFICANT CHANGE UP (ref 3.3–5)
ALBUMIN SERPL ELPH-MCNC: 4.4 G/DL — SIGNIFICANT CHANGE UP (ref 3.3–5)
ALP SERPL-CCNC: 78 U/L — SIGNIFICANT CHANGE UP (ref 40–120)
ALP SERPL-CCNC: 82 U/L — SIGNIFICANT CHANGE UP (ref 40–120)
ALT FLD-CCNC: 27 U/L — SIGNIFICANT CHANGE UP (ref 4–41)
ALT FLD-CCNC: 29 U/L — SIGNIFICANT CHANGE UP (ref 4–41)
ANION GAP SERPL CALC-SCNC: 11 MMOL/L — SIGNIFICANT CHANGE UP (ref 7–14)
ANION GAP SERPL CALC-SCNC: 13 MMOL/L — SIGNIFICANT CHANGE UP (ref 7–14)
ANION GAP SERPL CALC-SCNC: 15 MMOL/L — HIGH (ref 7–14)
APPEARANCE UR: CLEAR — SIGNIFICANT CHANGE UP
APTT BLD: 25.6 SEC — LOW (ref 27–36.3)
APTT BLD: 64.5 SEC — HIGH (ref 27–36.3)
AST SERPL-CCNC: 53 U/L — HIGH (ref 4–40)
AST SERPL-CCNC: 77 U/L — HIGH (ref 4–40)
BACTERIA # UR AUTO: NEGATIVE — SIGNIFICANT CHANGE UP
BASE EXCESS BLDV CALC-SCNC: 0.2 MMOL/L — SIGNIFICANT CHANGE UP (ref -3–2)
BASOPHILS # BLD AUTO: 0 K/UL — SIGNIFICANT CHANGE UP (ref 0–0.2)
BASOPHILS NFR BLD AUTO: 0 % — SIGNIFICANT CHANGE UP (ref 0–2)
BILIRUB SERPL-MCNC: 3.3 MG/DL — HIGH (ref 0.2–1.2)
BILIRUB SERPL-MCNC: 3.4 MG/DL — HIGH (ref 0.2–1.2)
BILIRUB UR-MCNC: NEGATIVE — SIGNIFICANT CHANGE UP
BLOOD GAS VENOUS - CREATININE: 1.5 MG/DL — HIGH (ref 0.5–1.3)
BLOOD GAS VENOUS COMPREHENSIVE RESULT: SIGNIFICANT CHANGE UP
BUN SERPL-MCNC: 19 MG/DL — SIGNIFICANT CHANGE UP (ref 7–23)
BUN SERPL-MCNC: 21 MG/DL — SIGNIFICANT CHANGE UP (ref 7–23)
BUN SERPL-MCNC: 22 MG/DL — SIGNIFICANT CHANGE UP (ref 7–23)
CALCIUM SERPL-MCNC: 7.7 MG/DL — LOW (ref 8.4–10.5)
CALCIUM SERPL-MCNC: 8.4 MG/DL — SIGNIFICANT CHANGE UP (ref 8.4–10.5)
CALCIUM SERPL-MCNC: 8.5 MG/DL — SIGNIFICANT CHANGE UP (ref 8.4–10.5)
CHLORIDE BLDV-SCNC: 110 MMOL/L — HIGH (ref 96–108)
CHLORIDE SERPL-SCNC: 104 MMOL/L — SIGNIFICANT CHANGE UP (ref 98–107)
CHLORIDE SERPL-SCNC: 105 MMOL/L — SIGNIFICANT CHANGE UP (ref 98–107)
CHLORIDE SERPL-SCNC: 106 MMOL/L — SIGNIFICANT CHANGE UP (ref 98–107)
CK MB BLD-MCNC: 3.8 % — HIGH (ref 0–2.5)
CK MB CFR SERPL CALC: 10.6 NG/ML — HIGH
CK SERPL-CCNC: 281 U/L — HIGH (ref 30–200)
CO2 SERPL-SCNC: 21 MMOL/L — LOW (ref 22–31)
CO2 SERPL-SCNC: 22 MMOL/L — SIGNIFICANT CHANGE UP (ref 22–31)
CO2 SERPL-SCNC: 23 MMOL/L — SIGNIFICANT CHANGE UP (ref 22–31)
COLOR SPEC: YELLOW — SIGNIFICANT CHANGE UP
CREAT ?TM UR-MCNC: 125 MG/DL — SIGNIFICANT CHANGE UP
CREAT SERPL-MCNC: 1.52 MG/DL — HIGH (ref 0.5–1.3)
CREAT SERPL-MCNC: 1.74 MG/DL — HIGH (ref 0.5–1.3)
CREAT SERPL-MCNC: 1.77 MG/DL — HIGH (ref 0.5–1.3)
DIFF PNL FLD: NEGATIVE — SIGNIFICANT CHANGE UP
EOSINOPHIL # BLD AUTO: 0 K/UL — SIGNIFICANT CHANGE UP (ref 0–0.5)
EOSINOPHIL NFR BLD AUTO: 0 % — SIGNIFICANT CHANGE UP (ref 0–6)
EPI CELLS # UR: 1 /HPF — SIGNIFICANT CHANGE UP (ref 0–5)
GAS PNL BLDV: 137 MMOL/L — SIGNIFICANT CHANGE UP (ref 136–146)
GLUCOSE BLDV-MCNC: 145 MG/DL — HIGH (ref 70–99)
GLUCOSE SERPL-MCNC: 117 MG/DL — HIGH (ref 70–99)
GLUCOSE SERPL-MCNC: 139 MG/DL — HIGH (ref 70–99)
GLUCOSE SERPL-MCNC: 99 MG/DL — SIGNIFICANT CHANGE UP (ref 70–99)
GLUCOSE UR QL: NEGATIVE — SIGNIFICANT CHANGE UP
HCO3 BLDV-SCNC: 23 MMOL/L — SIGNIFICANT CHANGE UP (ref 20–27)
HCT VFR BLD CALC: 35.2 % — LOW (ref 39–50)
HCT VFR BLDA CALC: 33.8 % — LOW (ref 39–51)
HGB BLD CALC-MCNC: 10.9 G/DL — LOW (ref 13–17)
HGB BLD-MCNC: 12.9 G/DL — LOW (ref 13–17)
HYALINE CASTS # UR AUTO: 4 /LPF — SIGNIFICANT CHANGE UP (ref 0–7)
IANC: 18.31 K/UL — HIGH (ref 1.5–8.5)
INR BLD: 1.36 RATIO — HIGH (ref 0.88–1.16)
KETONES UR-MCNC: NEGATIVE — SIGNIFICANT CHANGE UP
LACTATE BLDV-MCNC: 1.8 MMOL/L — SIGNIFICANT CHANGE UP (ref 0.5–2)
LEUKOCYTE ESTERASE UR-ACNC: NEGATIVE — SIGNIFICANT CHANGE UP
LYMPHOCYTES # BLD AUTO: 18.8 % — SIGNIFICANT CHANGE UP (ref 13–44)
LYMPHOCYTES # BLD AUTO: 4.79 K/UL — HIGH (ref 1–3.3)
MCHC RBC-ENTMCNC: 31 PG — SIGNIFICANT CHANGE UP (ref 27–34)
MCHC RBC-ENTMCNC: 36.6 GM/DL — HIGH (ref 32–36)
MCV RBC AUTO: 84.6 FL — SIGNIFICANT CHANGE UP (ref 80–100)
MONOCYTES # BLD AUTO: 1.96 K/UL — HIGH (ref 0–0.9)
MONOCYTES NFR BLD AUTO: 7.7 % — SIGNIFICANT CHANGE UP (ref 2–14)
NEUTROPHILS # BLD AUTO: 17.85 K/UL — HIGH (ref 1.8–7.4)
NEUTROPHILS NFR BLD AUTO: 67.5 % — SIGNIFICANT CHANGE UP (ref 43–77)
NITRITE UR-MCNC: NEGATIVE — SIGNIFICANT CHANGE UP
PCO2 BLDV: 46 MMHG — SIGNIFICANT CHANGE UP (ref 41–51)
PH BLDV: 7.35 — SIGNIFICANT CHANGE UP (ref 7.32–7.43)
PH UR: 6 — SIGNIFICANT CHANGE UP (ref 5–8)
PLATELET # BLD AUTO: 236 K/UL — SIGNIFICANT CHANGE UP (ref 150–400)
PO2 BLDV: 30 MMHG — LOW (ref 35–40)
POTASSIUM BLDV-SCNC: 4 MMOL/L — SIGNIFICANT CHANGE UP (ref 3.4–4.5)
POTASSIUM SERPL-MCNC: 4.2 MMOL/L — SIGNIFICANT CHANGE UP (ref 3.5–5.3)
POTASSIUM SERPL-MCNC: 6 MMOL/L — HIGH (ref 3.5–5.3)
POTASSIUM SERPL-MCNC: 6.1 MMOL/L — HIGH (ref 3.5–5.3)
POTASSIUM SERPL-SCNC: 4.2 MMOL/L — SIGNIFICANT CHANGE UP (ref 3.5–5.3)
POTASSIUM SERPL-SCNC: 6 MMOL/L — HIGH (ref 3.5–5.3)
POTASSIUM SERPL-SCNC: 6.1 MMOL/L — HIGH (ref 3.5–5.3)
PROT SERPL-MCNC: 7.2 G/DL — SIGNIFICANT CHANGE UP (ref 6–8.3)
PROT SERPL-MCNC: 7.6 G/DL — SIGNIFICANT CHANGE UP (ref 6–8.3)
PROT UR-MCNC: ABNORMAL
PROTHROM AB SERPL-ACNC: 15.3 SEC — HIGH (ref 10.6–13.6)
RBC # BLD: 4.16 M/UL — LOW (ref 4.2–5.8)
RBC # FLD: 23.9 % — HIGH (ref 10.3–14.5)
RBC CASTS # UR COMP ASSIST: 1 /HPF — SIGNIFICANT CHANGE UP (ref 0–4)
SAO2 % BLDV: 33 % — LOW (ref 60–85)
SARS-COV-2 RNA SPEC QL NAA+PROBE: SIGNIFICANT CHANGE UP
SODIUM SERPL-SCNC: 140 MMOL/L — SIGNIFICANT CHANGE UP (ref 135–145)
SP GR SPEC: 1.02 — SIGNIFICANT CHANGE UP (ref 1.01–1.02)
TROPONIN T, HIGH SENSITIVITY RESULT: 323 NG/L — CRITICAL HIGH
TROPONIN T, HIGH SENSITIVITY RESULT: 451 NG/L — CRITICAL HIGH
TSH SERPL-MCNC: 1.96 UIU/ML — SIGNIFICANT CHANGE UP (ref 0.27–4.2)
UROBILINOGEN FLD QL: ABNORMAL
UUN UR-MCNC: 532 MG/DL — SIGNIFICANT CHANGE UP
WBC # BLD: 25.46 K/UL — HIGH (ref 3.8–10.5)
WBC # FLD AUTO: 25.46 K/UL — HIGH (ref 3.8–10.5)
WBC UR QL: 2 /HPF — SIGNIFICANT CHANGE UP (ref 0–5)

## 2021-07-02 PROCEDURE — 99291 CRITICAL CARE FIRST HOUR: CPT | Mod: 25

## 2021-07-02 PROCEDURE — 93010 ELECTROCARDIOGRAM REPORT: CPT

## 2021-07-02 PROCEDURE — 71275 CT ANGIOGRAPHY CHEST: CPT | Mod: 26

## 2021-07-02 RX ORDER — DEXTROSE 50 % IN WATER 50 %
50 SYRINGE (ML) INTRAVENOUS ONCE
Refills: 0 | Status: COMPLETED | OUTPATIENT
Start: 2021-07-02 | End: 2021-07-02

## 2021-07-02 RX ORDER — SODIUM CHLORIDE 9 MG/ML
1000 INJECTION INTRAMUSCULAR; INTRAVENOUS; SUBCUTANEOUS ONCE
Refills: 0 | Status: COMPLETED | OUTPATIENT
Start: 2021-07-02 | End: 2021-07-02

## 2021-07-02 RX ORDER — HEPARIN SODIUM 5000 [USP'U]/ML
6500 INJECTION INTRAVENOUS; SUBCUTANEOUS EVERY 6 HOURS
Refills: 0 | Status: DISCONTINUED | OUTPATIENT
Start: 2021-07-02 | End: 2021-07-07

## 2021-07-02 RX ORDER — HEPARIN SODIUM 5000 [USP'U]/ML
1500 INJECTION INTRAVENOUS; SUBCUTANEOUS
Qty: 25000 | Refills: 0 | Status: DISCONTINUED | OUTPATIENT
Start: 2021-07-02 | End: 2021-07-07

## 2021-07-02 RX ORDER — HEPARIN SODIUM 5000 [USP'U]/ML
3000 INJECTION INTRAVENOUS; SUBCUTANEOUS EVERY 6 HOURS
Refills: 0 | Status: DISCONTINUED | OUTPATIENT
Start: 2021-07-02 | End: 2021-07-07

## 2021-07-02 RX ORDER — HEPARIN SODIUM 5000 [USP'U]/ML
6500 INJECTION INTRAVENOUS; SUBCUTANEOUS ONCE
Refills: 0 | Status: COMPLETED | OUTPATIENT
Start: 2021-07-02 | End: 2021-07-02

## 2021-07-02 RX ORDER — INSULIN HUMAN 100 [IU]/ML
5 INJECTION, SOLUTION SUBCUTANEOUS ONCE
Refills: 0 | Status: COMPLETED | OUTPATIENT
Start: 2021-07-02 | End: 2021-07-02

## 2021-07-02 RX ORDER — SODIUM ZIRCONIUM CYCLOSILICATE 10 G/10G
5 POWDER, FOR SUSPENSION ORAL ONCE
Refills: 0 | Status: COMPLETED | OUTPATIENT
Start: 2021-07-02 | End: 2021-07-02

## 2021-07-02 RX ADMIN — SODIUM CHLORIDE 333.33 MILLILITER(S): 9 INJECTION INTRAMUSCULAR; INTRAVENOUS; SUBCUTANEOUS at 18:39

## 2021-07-02 RX ADMIN — HEPARIN SODIUM 6500 UNIT(S): 5000 INJECTION INTRAVENOUS; SUBCUTANEOUS at 16:38

## 2021-07-02 RX ADMIN — SODIUM ZIRCONIUM CYCLOSILICATE 5 GRAM(S): 10 POWDER, FOR SUSPENSION ORAL at 21:50

## 2021-07-02 RX ADMIN — INSULIN HUMAN 5 UNIT(S): 100 INJECTION, SOLUTION SUBCUTANEOUS at 17:32

## 2021-07-02 RX ADMIN — Medication 50 MILLILITER(S): at 17:25

## 2021-07-02 RX ADMIN — HEPARIN SODIUM 1500 UNIT(S)/HR: 5000 INJECTION INTRAVENOUS; SUBCUTANEOUS at 23:22

## 2021-07-02 RX ADMIN — HEPARIN SODIUM 1500 UNIT(S)/HR: 5000 INJECTION INTRAVENOUS; SUBCUTANEOUS at 16:42

## 2021-07-02 NOTE — H&P ADULT - PROBLEM SELECTOR PLAN 6
[Joint Stiffness] : joint stiffness [Postnasal Drip] : postnasal drip [Joint Pain] : joint pain [Joint Swelling] : joint swelling [Muscle Pain] : muscle pain [Anxiety] : anxiety [Negative] : Respiratory [Sore Throat] : no sore throat DVT ppx: On Heparin gtt for b/l PE  diet: DASH/TLC leukocytosis and tachycardic, afebrile  leukocytosis possibly reactive, no S/Sx of infxn  lactate wnl  UA negative, CT chest as above, monitor off abx   trend leukocytosis

## 2021-07-02 NOTE — H&P ADULT - ASSESSMENT
Patient is a 51yoM with PMH of CAD, MI, b/l PE in Feb 2020 w/ recurrent PE Apr 2021 (discharged on Eliquis), SC trait w/o crisis (on 1L NC at home), now presenting with bilateral segmental/subsegmental pulmonary emboli complicated by hypoxemia, hypertroponinemia, and REGINA.

## 2021-07-02 NOTE — CONSULT NOTE ADULT - ASSESSMENT
52 y/o M with PMH of CAD, MI, b/l PE in Feb 2020 w/ recurrent PE April 2021 (dishcarged on eliquis), SC hgbSC w/o crisis, on 1L of O2 at home presenting with 1 week of chest pain and worsening SOB in setting of running out of eliquis found to have b/l PE leading to hypoxia requiring oxygen supplementation with course c/b REGINA, hyperkalemia, elevated troponin. MICU consulted for further evaluation.    #Recommendations:  - Would trial HFNC for patient's tachypnea/increased WOB  - Agree with hep gtt  - Pt appears hypovolemic with clear lungs on exam, no pulmonary edema on CT chest, would administer 1L IVF over 3-4 hours which may help with REGINA/hyperkalemia and tachycardia  - Would give lokelma x1 as well  - Repeat troponin and proBNP  - Given pt hemodynamically stable with good mentation, pt does not require ICU level of care at this time. Please page or reconsult if pt respiratory status declines despite HFNC, or with any other questions or concerns.    FINAL RECOMMENDATIONS TO BE DISCUSSED WITH ATTENDING    Helio Jones  EM/IM PGY4  MICU Consult Resident 50 y/o M with PMH of CAD, MI, b/l PE in Feb 2020 w/ recurrent PE April 2021 (dishcarged on eliquis), SC hgbSC w/o crisis, on 1L of O2 at home presenting with 1 week of chest pain and worsening SOB in setting of running out of eliquis found to have b/l PE leading to hypoxia requiring oxygen supplementation with course c/b REGINA, hyperkalemia, elevated troponin. MICU consulted for further evaluation.    #Recommendations: POCUS showed  - Would trial HFNC for patient's tachypnea/increased WOB  - Agree with hep gtt  - Pt appears hypovolemic with clear lungs on exam, no pulmonary edema on CT chest, would administer 1L IVF over 3-4 hours which may help with REGINA/hyperkalemia and tachycardia  - Would give lokelma x1 as well  - Repeat troponin and proBNP  - Given pt hemodynamically stable with good mentation, pt does not require ICU level of care at this time. Please page or reconsult if pt respiratory status declines despite HFNC, or with any other questions or concerns.    Helio Jones  EM/IM PGY4  MICU Consult Resident 50 y/o M with PMH of CAD, MI, b/l PE in Feb 2020 w/ recurrent PE April 2021 (dishcarged on eliquis), SC hgbSC w/o crisis, on 1L of O2 at home presenting with 1 week of chest pain and worsening SOB in setting of running out of eliquis found to have b/l PE leading to hypoxia requiring oxygen supplementation with course c/b REGINA, hyperkalemia, elevated troponin. MICU consulted for further evaluation.    #Recommendations - bedside POCUS showing RV enlargement, LV grossly normal function, septal straightening, A-line predominant pattern bilaterally  - Would trial HFNC for patient's tachypnea/increased WOB  - Agree with hep gtt  - Pt appears hypovolemic with clear lungs on exam, no pulmonary edema on imaging, would administer 1L IVF over 3-4 hours which may help with REGINA/hyperkalemia and tachycardia  - Would give lokelma x1 as well  - Repeat troponin and proBNP  - Given pt hemodynamically stable with good mentation, pt does not require ICU level of care at this time. Please page or reconsult if pt respiratory status declines despite HFNC, or with any other questions or concerns.    Helio Jones  EM/IM PGY4  MICU Consult Resident

## 2021-07-02 NOTE — ED PROVIDER NOTE - CARDIAC, MLM
Scribe Attestation (For Scribes USE Only)... Attending Attestation (For Attendings USE Only).../Scribe Attestation (For Scribes USE Only)... Normal rate, regular rhythm.  Heart sounds S1, S2.  No murmurs, rubs or gallops.

## 2021-07-02 NOTE — H&P ADULT - NSHPPHYSICALEXAM_GEN_ALL_CORE
T(C): 36.8 (07-02-21 @ 17:32), Max: 36.8 (07-02-21 @ 17:32)  HR: 108 (07-02-21 @ 18:56) (108 - 142)  BP: 123/88 (07-02-21 @ 17:32) (122/62 - 124/85)  RR: 32 (07-02-21 @ 18:56) (18 - 32)  SpO2: 100% (07-02-21 @ 18:56) (82% - 100%)    PHYSICAL EXAM:  GENERAL: NAD, resting in bed  HEAD:  Atraumatic, Normocephalic  EYES: EOMI, L eye deviation, PERRLA, conjunctiva and sclera clear  ENMT: Dry mucous membranes  NECK: Supple, No JVD  CHEST/LUNG: +Tachypneic, Clear to auscultation bilaterally; No wheezing  HEART: Tachycardic rate and rhythm; No murmurs, rubs, or gallops  ABDOMEN: Soft, Nontender, Nondistended; Bowel sounds present  EXTREMITIES:  2+ Peripheral Pulses, No edema  LYMPH: No lymphadenopathy noted  SKIN: No rashes or lesions  NERVOUS SYSTEM:  Alert & Oriented X3, No focal deficits. T(C): 36.8 (07-02-21 @ 17:32), Max: 36.8 (07-02-21 @ 17:32)  HR: 108 (07-02-21 @ 18:56) (108 - 142)  BP: 123/88 (07-02-21 @ 17:32) (122/62 - 124/85)  RR: 32 (07-02-21 @ 18:56) (18 - 32)  SpO2: 100% (07-02-21 @ 18:56) (82% - 100%)    PHYSICAL EXAM:  GENERAL: NAD, resting in bed  HEAD:  Atraumatic, Normocephalic  EYES: EOMI, L eye deviation (not appreciated on attending exam, EOMI), PERRLA, conjunctiva and sclera clear  ENMT: Dry mucous membranes  NECK: Supple, No JVD  CHEST/LUNG: +Tachypneic, Clear to auscultation bilaterally; No wheezing or rales  HEART: Tachycardic rate and rhythm; II/VI CANELO; No rubs or gallops  ABDOMEN: Soft, Nontender, Nondistended; Bowel sounds present  EXTREMITIES:  2+ Peripheral Pulses, No edema  LYMPH: No supraclavicular or cervical lymphadenopathy appreciated   SKIN: No rashes or lesions  NERVOUS SYSTEM:  Alert & Oriented X3, No focal deficits.

## 2021-07-02 NOTE — H&P ADULT - PROBLEM SELECTOR PLAN 7
DVT ppx: On Heparin gtt for b/l PE  diet: DASH/TLC  4/2021 now 520ms  avoid QT prolonging meds  monitor on tele  cards on board, f/u recs  TTE pending

## 2021-07-02 NOTE — ED PROVIDER NOTE - ATTENDING CONTRIBUTION TO CARE
Pt was seen and evaluated by me. Pt is a 50 y/o male with PMhx of PE, DVT, and CAD on 1L of home O2 who presented to the ED for SOB X 1 wk. Pt states he recently was stopped off his Xarelto and notes over the past week having increased SOB and chest pain. Pt denies any fever, chills, nausea, vomiting, or abd pain. Tachypneic. Tachycardic. Abd soft, non-tender. No LE swelling or calf tenderness.  Concern for PE/ACS  Labs, EKG, CT, Heparin

## 2021-07-02 NOTE — CONSULT NOTE ADULT - SUBJECTIVE AND OBJECTIVE BOX
CHIEF COMPLAINT: SOB    HPI:    52 y/o M with PMH of CAD, MI, b/l PE in Feb 2020 w/ recurrent PE April 2021 (dishcarged on eliquis), SC hgbSC w/o crisis, on 1L of O2 at home presenting with 1 week of intermittent sharp non-exertional non-radiating chest pain for 1 week a/w nausea and SOB. Pt notes he ran out of Novintis yesterday. He states SOB worsened significantly today and that he felt like he was going to pass out thus came to the ED for evaluation.    On arrival to the ED pt afebrile, , /62, RR 18, SpO2 82% on RA. Pt placed on NRB 15L and O2 sat improved to 100% however tachypneic 30s-40s. Labwork obtained significant for WBC 25.46, K 6.0 (mod hemolyzed), Cr 1.77 (0.96 May 2021), trop 323, tbili 3.4, AST 74. CTA chest obtained showed bilateral segmental and subsegmental pulmonary emboli, dilated pulmonary artery and right ventricular enlargement. Pt was given 5u regular insulin IV/amp D50% and started on hep gtt. MICU consulted for further evaluation and management regarding b/l PE with hemodynamic changes.    PAST MEDICAL & SURGICAL HISTORY:  Myocardial infarct    Bilateral pulmonary embolism  Feb 2020    H/O sickle cell trait    S/P wrist surgery  Left wrist        FAMILY HISTORY:  Family history of cancer in mother (Mother)  Breast cancer    FH: prostate cancer (Father)  Brother, passed away at 54        SOCIAL HISTORY:  Smoking: Denies  EtOH Use: Denies    Allergies    No Known Allergies    Intolerances        HOME MEDICATIONS:    REVIEW OF SYSTEMS:  Constitutional: no fevers or chills  CV: (+) chest pain, no palpitations  Resp: (+) SOB  GI: No abd pain, no n/v/d  [x] All other systems negative    OBJECTIVE:  ICU Vital Signs Last 24 Hrs  T(C): 36.8 (02 Jul 2021 17:32), Max: 36.8 (02 Jul 2021 17:32)  T(F): 98.2 (02 Jul 2021 17:32), Max: 98.2 (02 Jul 2021 17:32)  HR: 111 (02 Jul 2021 17:32) (111 - 142)  BP: 123/88 (02 Jul 2021 17:32) (122/62 - 124/85)  BP(mean): --  ABP: --  ABP(mean): --  RR: 30 (02 Jul 2021 17:32) (18 - 30)  SpO2: 100% (02 Jul 2021 17:32) (82% - 100%)        CAPILLARY BLOOD GLUCOSE      POCT Blood Glucose.: 92 mg/dL (02 Jul 2021 17:26)      PHYSICAL EXAM:  GENERAL: Sitting comfortable in bed, in no acute distress  HENMT: Atraumatic, dry mucous membranes  EYES: Clear bilaterally, PERRL, EOMs intact b/l  HEART: tachycardic, regular rhythm, nl S1/S2, no murmurs  RESPIRATORY: Clear to auscultation bilaterally, no wheezes/rhonchi/rales  ABDOMEN: +BS, soft, nontender, nondistended  EXTREMITIES: No lower extremity edema, +2 radial pulses b/l  NEURO:  A&Ox4, no focal motor deficits or sensory deficits   Heme/LYMPH: No ecchymosis or bruising, no anterior/posterior cervical or supraclavicular LAD  SKIN:  Skin normal color for race, warm, dry and intact. No evidence of rash.      HOSPITAL MEDICATIONS:  MEDICATIONS  (STANDING):  heparin  Infusion. 1500 Unit(s)/Hr (15 mL/Hr) IV Continuous <Continuous>  sodium chloride 0.9% Bolus 1000 milliLiter(s) IV Bolus once    MEDICATIONS  (PRN):  heparin   Injectable 6500 Unit(s) IV Push every 6 hours PRN For aPTT less than 40  heparin   Injectable 3000 Unit(s) IV Push every 6 hours PRN For aPTT between 40 - 57      LABS:                        12.9   25.46 )-----------( 236      ( 02 Jul 2021 15:53 )             35.2     07-02    140  |  105  |  21  ----------------------------<  99  6.1<H>   |  22  |  1.74<H>    Ca    8.4      02 Jul 2021 17:00    TPro  7.2  /  Alb  4.3  /  TBili  3.3<H>  /  DBili  x   /  AST  53<H>  /  ALT  27  /  AlkPhos  78  07-02    PT/INR - ( 02 Jul 2021 15:53 )   PT: 15.3 sec;   INR: 1.36 ratio         PTT - ( 02 Jul 2021 15:53 )  PTT:25.6 sec        RADIOLOGY:  [x] Reviewed and interpreted by me    EKG: Reviewed

## 2021-07-02 NOTE — ED PROVIDER NOTE - SEVERE SEPSIS ALERT DETAILS
Dr. Camargo: Noted elevated WBC with HR but given presentation PE is more likely than an infectious etiology.

## 2021-07-02 NOTE — ED ADULT NURSE REASSESSMENT NOTE - NS ED NURSE REASSESS COMMENT FT1
covering primary RN for break , pt is in bed A and Ox 3 in [place] on non rebreather mask at 15 LPM, tolerating well, pt's breathing is rapid but unlabored, pt is on monitor with sinus tachycardia noted, orders noted and completed, additional IV line initiated labs drawn and sent. Heparin drip ongoing at 15 ml/hr to right AC 20G, no SSx of infiltration or infection noted.

## 2021-07-02 NOTE — H&P ADULT - NSHPLABSRESULTS_GEN_ALL_CORE
LABS: Personally reviewed labs, imaging, and ECG                          12.9   25.46 )-----------( 236      ( 02 Jul 2021 15:53 )             35.2       07-02    140  |  105  |  21  ----------------------------<  99  6.1<H>   |  22  |  1.74<H>    Ca    8.4      02 Jul 2021 17:00    TPro  7.2  /  Alb  4.3  /  TBili  3.3<H>  /  DBili  x   /  AST  53<H>  /  ALT  27  /  AlkPhos  78  07-02       LIVER FUNCTIONS - ( 02 Jul 2021 17:00 )  Alb: 4.3 g/dL / Pro: 7.2 g/dL / ALK PHOS: 78 U/L / ALT: 27 U/L / AST: 53 U/L / GGT: x                        PT/INR - ( 02 Jul 2021 15:53 )   PT: 15.3 sec;   INR: 1.36 ratio         PTT - ( 02 Jul 2021 15:53 )  PTT:25.6 sec    Lactate Trend            CAPILLARY BLOOD GLUCOSE  86 (02 Jul 2021 19:11)      POCT Blood Glucose.: 86 mg/dL (02 Jul 2021 19:08)            RADIOLOGY & ADDITIONAL TESTS:  < from: CT Angio Chest PE Protocol w/ IV Cont (07.02.21 @ 16:56) >    IMPRESSION:    Bilateral segmental and subsegmental pulmonary emboli. This was discussed with Dr. Martinez at 5:10 PM on July 2, 2021.    Suggestion of pulmonary hypertension with dilated pulmonary artery and right ventricular enlargement. Consider CTEPH.    New left upper lobe 0.4 cm nodule of uncertain etiology. Follow-up in 3 months is recommended.      < end of copied text > LABS: Personally reviewed labs, imaging, and ECG                        12.9   25.46 )-----------( 236      ( 2021 15:53 )             35.2     07    140  |  105  |  21  ----------------------------<  99  6.1<H>   |  22  |  1.74<H>    Ca    8.4      2021 17:00    TPro  7.2  /  Alb  4.3  /  TBili  3.3<H>  /  DBili  x   /  AST  53<H>  /  ALT  27  /  AlkPhos  78  07-     LIVER FUNCTIONS - ( 2021 17:00 )  Alb: 4.3 g/dL / Pro: 7.2 g/dL / ALK PHOS: 78 U/L / ALT: 27 U/L / AST: 53 U/L / GGT: x              PT/INR - ( 2021 15:53 )   PT: 15.3 sec;   INR: 1.36 ratio    PTT - ( 2021 15:53 )  PTT:25.6 sec    22:29 - VBG - pH: 7.35  | pCO2: 46    | pO2: 30    | Lactate: 1.8      Troponin T, High Sensitivity Result: 451 ng/L (21 @ 22:29)  Troponin T, High Sensitivity Result: 323 ng/L (21 @ 15:53)    Creatine Kinase, Serum: 281 U/L (21 @ 22:29)  CKMB Mass Assay (21 @ 22:29)    CKMB Units: 10.6 ng/mL    CPK Mass Assay %: 3.8 %    Serum Pro-Brain Natriuretic Peptide: 4412 pg/mL (21 @ 17:21)    POCT Blood Glucose.: 86 mg/dL (2021 19:08)    COVID-19 PCR: NotDetec (21 @ 17:23)    Urinalysis Basic - ( 2021 22:29 )  Color: Yellow / Appearance: Clear / S.022 / pH: x  Gluc: x / Ketone: Negative  / Bili: Negative / Urobili: 6 mg/dL   Blood: x / Protein: 30 mg/dL / Nitrite: Negative   Leuk Esterase: Negative / RBC: 1 /HPF / WBC 2 /HPF   Sq Epi: x / Non Sq Epi: 1 /HPF / Bacteria: Negative    RADIOLOGY & ADDITIONAL TESTS:  < from: CT Angio Chest PE Protocol w/ IV Cont (21 @ 16:56) >  Pulmonary Artery:  Pulmonary emboli within segmental and subsegmental branches of the upper lobes and middle lobe. Limited assessment of distal branches in the lower lobes due to motion artifact. Enlarged main pulmonary artery at 3.3 cm.  Lungs, airways and pleura: Patent central airways. Stable mosaic attenuation with bilateral perihilar relative groundglass, likely secondary to pulmonary hypertension. New left upper lobe 0.4 cm solid nodule (2-43). Unremarkable pleura.  Lymph nodes and mediastinum: Unremarkable thyroid. No enlarged lymph nodes.  Heart, pericardium, and vasculature: Dilated right ventricle. No pericardial effusion. Normal caliber aorta.  Bones and Soft Tissues: Unremarkable.  Other: Atrophic spleen.  IMPRESSION: Bilateral segmental and subsegmental pulmonary emboli. Suggestion of pulmonary hypertension with dilated pulmonary artery and right ventricular enlargement. Consider CTEPH. New left upper lobe 0.4 cm nodule of uncertain etiology. Follow-up in 3 months is recommended.  < end of copied text > LABS: Personally reviewed labs, imaging, and ECG                        12.9   25.46 )-----------( 236      ( 2021 15:53 )             35.2     07    140  |  105  |  21  ----------------------------<  99  6.1<H>   |  22  |  1.74<H>    Ca    8.4      2021 17:00    TPro  7.2  /  Alb  4.3  /  TBili  3.3<H>  /  DBili  x   /  AST  53<H>  /  ALT  27  /  AlkPhos  78  07-     LIVER FUNCTIONS - ( 2021 17:00 )  Alb: 4.3 g/dL / Pro: 7.2 g/dL / ALK PHOS: 78 U/L / ALT: 27 U/L / AST: 53 U/L / GGT: x              PT/INR - ( 2021 15:53 )   PT: 15.3 sec;   INR: 1.36 ratio    PTT - ( 2021 15:53 )  PTT:25.6 sec    22:29 - VBG - pH: 7.35  | pCO2: 46    | pO2: 30    | Lactate: 1.8      Troponin T, High Sensitivity Result: 451 ng/L (21 @ 22:29)  Troponin T, High Sensitivity Result: 323 ng/L (21 @ 15:53)    Creatine Kinase, Serum: 281 U/L (21 @ 22:29)  CKMB Mass Assay (21 @ 22:29)    CKMB Units: 10.6 ng/mL    CPK Mass Assay %: 3.8 %    Serum Pro-Brain Natriuretic Peptide: 4412 pg/mL (21 @ 17:21)    POCT Blood Glucose.: 86 mg/dL (2021 19:08)    COVID-19 PCR: NotDetec (21 @ 17:23)    Urinalysis Basic - ( 2021 22:29 )  Color: Yellow / Appearance: Clear / S.022 / pH: x  Gluc: x / Ketone: Negative  / Bili: Negative / Urobili: 6 mg/dL   Blood: x / Protein: 30 mg/dL / Nitrite: Negative   Leuk Esterase: Negative / RBC: 1 /HPF / WBC 2 /HPF   Sq Epi: x / Non Sq Epi: 1 /HPF / Bacteria: Negative    RADIOLOGY & ADDITIONAL TESTS:  < from: CT Angio Chest PE Protocol w/ IV Cont (21 @ 16:56) >  Pulmonary Artery:  Pulmonary emboli within segmental and subsegmental branches of the upper lobes and middle lobe. Limited assessment of distal branches in the lower lobes due to motion artifact. Enlarged main pulmonary artery at 3.3 cm.  Lungs, airways and pleura: Patent central airways. Stable mosaic attenuation with bilateral perihilar relative groundglass, likely secondary to pulmonary hypertension. New left upper lobe 0.4 cm solid nodule (2-43). Unremarkable pleura.  Lymph nodes and mediastinum: Unremarkable thyroid. No enlarged lymph nodes.  Heart, pericardium, and vasculature: Dilated right ventricle. No pericardial effusion. Normal caliber aorta.  Bones and Soft Tissues: Unremarkable.  Other: Atrophic spleen.  IMPRESSION: Bilateral segmental and subsegmental pulmonary emboli. Suggestion of pulmonary hypertension with dilated pulmonary artery and right ventricular enlargement. Consider CTEPH. New left upper lobe 0.4 cm nodule of uncertain etiology. Follow-up in 3 months is recommended.  < end of copied text >    EKG personally reviewed - 105bpm sinus tach, RAD, TWI II, III, aVF, V1-V5; QTc 520ms

## 2021-07-02 NOTE — ED PROVIDER NOTE - FAMILY HISTORY
Mother  Still living? Unknown  Family history of cancer in mother, Age at diagnosis: Age Unknown     Father  Still living? Unknown  FH: prostate cancer, Age at diagnosis: Age Unknown

## 2021-07-02 NOTE — H&P ADULT - PROBLEM SELECTOR PLAN 3
Admission Trop T=323, likely secondary to RV strain from b/l PE, less likely ACS given no active chest pain.   - EKG with TWI  - Cardiology consult  - trend trop T q8h until peaks  - TTE Admission Trop T=323, likely secondary to RV strain from b/l PE, less likely ACS given no active chest pain.   - EKG with TWI  - TTE  - Cardiology consult  - trend trop T, CK, CKMB

## 2021-07-02 NOTE — H&P ADULT - ATTENDING COMMENTS
51-year-old male with CAD/MI, bilat PE w/recurrent PE 4/2021, HbSC, w/acute on chronic hypoxic resp failure secondary to acute on chronic PE. Patient was hospitalized with recurrent PE 4/2021, has home O2, reports being on 1L PRN. Now with recurrent PE, with signs of acute HF. Appreciate MICU and CCU recs, not an ICU candidate at present. O2 as needed. Echo pending. Continue to monitor closely.

## 2021-07-02 NOTE — ED PROVIDER NOTE - PROGRESS NOTE DETAILS
Dr. Camargo: Pt was initially on 4L NC sating 94%. Remained tachypneic and placed on NRB with sats at 100% and improvement in the rate. Dr. Camargo: Given presentation of tachycardia, hypoxia, and tachypnea as well as history previous PEs, Heparin ordered and CT expedited. K+ elevated at 6.0 repeat 6.1, Cr elevated at 1.77, repeat 1.74. Repeat BMP at 6:30. NS IVF bolus given. K+ elevated at 6.0 repeat 6.1, Cr elevated at 1.77, repeat 1.74. Repeat BMP at 6:30. NS IVF bolus given.  Troponin 323, B/l segmental and subsegmental PE. Called MICU. Spoke with MICU, advised to put pt on high flow and admit to medicine with tele and pulse ox.

## 2021-07-02 NOTE — H&P ADULT - PROBLEM SELECTOR PLAN 2
Cr=1.77 (baseline 0.95 May 2021)  - given 1L NS in ED  - avoid nephrotoxic agents  - trend Cr Admission O2=82% on RA. Patient placed on NRB 15L with improvement in O2 sats to 100%, though with persisting tachypnea in 30s. Escalated to HFNC with stable O2 sats.   - c/w HFNC at this time, satting 100%. Plan to wean as tolerated to maintain spO2>90%  - obtain comprehensive VBG (check for hypercapnia, lactate) likely secondary to acute on chronic PE  On 1L home O2 PRN  Admission O2=82% on RA. Patient placed on NRB 15L with improvement in O2 sats to 100%, though with persisting tachypnea in 30s. Escalated to HFNC with stable O2 sats.   - c/w HFNC at this time, satting 100%. Plan to wean as tolerated to maintain spO2>90%  - obtain comprehensive VBG (check for hypercapnia, lactate)

## 2021-07-02 NOTE — CONSULT NOTE ADULT - ASSESSMENT
50yo M w/ CAD, MI, b/l PE in Feb 2020 w/ recurrent PE April 2021 (on Eliquis), SC hgbSC w/o crisis, on 1L of O2 at home presenting with 1 week of chest pain and worsening SOB in setting of running out of eliquis found to have b/l PE's leading to hypoxia requiring oxygen supplementation with course c/b REGINA, hyperkalemia, elevated troponin. CCU consulted for further evaluation.    # Pulmonary embolism  CTA shows B/l segmental and subsegmental PE  Patient hemodynamically stable, currently on high flow with improved SOB and no distress, sPO2 96%, , /89  EKG with sinus tach S1Q3T3 and T wave inversions. CE elevated continue to trend until flat/downtrending  TTE in am, stat if hemodynamically unstable.  Continue heparin gtt. with goal PTT 58-99  Monitor hemodynamic status, Will need continuous tele and oxygen saturation monitoring.  Not a candidate for CCU at this time    Thank you, if any questions or clinical situation changes please call [employer] #[id number].  Attending Attestation to follow

## 2021-07-02 NOTE — H&P ADULT - NSICDXFAMILYHX_GEN_ALL_CORE_FT
FAMILY HISTORY:  Father  Still living? Unknown  FH: prostate cancer, Age at diagnosis: Age Unknown    Mother  Still living? Unknown  Family history of cancer in mother, Age at diagnosis: Age Unknown

## 2021-07-02 NOTE — H&P ADULT - HISTORY OF PRESENT ILLNESS
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 He reports that the sensation of racing heart, sharp non-radiating chest pain, and Shortness of breath started 7 days ago, and has persistently worsened since then. Reports feeling like he was going to pass out spurring him to come to the ED. He reports these symptoms feel similar to past episodes of PE, and that he has taken his Eliquis as prescribed since prior admission, until he ran out of Eliquis yesterday. Denies fever, chills, pleuritic chest pain, nausea, vomiting, abdominal pain, diarrhea, constipation, recent leg swelling or pain. Reports he goes to [facility] for PCP, but has no history of cancer.     In the ED: afebrile, LL=645, LC=305/62, RR=18, O2=82% on RA. Patient placed on NRB 15L with improvement in O2 sats to 100%, though with persisting tachypnea in 30s. Escalated to HFNC with stable O2 sats.   Pertinent labs: wbc=25, K=6.0, Cr=1.77 (baseline 0.95 May 2021), Trop T=323, shaMCY=4208, Tbili=3.4, AST=77  Imaging: CT Angio: shows bilateral segmental/subsegmental PE, pulmonary HTN w/ dilated pulmonary artery and RV enlargement (?CTEPH). Also, new PAIGE 0.4cm nodule.  Given 1L NS with improvement in tachycardia. Patient started on Heparin drip, and given Reg insulin and D5 for hyperkalemia.

## 2021-07-02 NOTE — ED ADULT NURSE NOTE - NSIMPLEMENTINTERV_GEN_ALL_ED
Implemented All Universal Safety Interventions:  [place] to call system. Call bell, personal items and telephone within reach. Instruct patient to call for assistance. Room bathroom lighting operational. Non-slip footwear when patient is off stretcher. Physically safe environment: no spills, clutter or unnecessary equipment. Stretcher in lowest position, wheels locked, appropriate side rails in place.

## 2021-07-02 NOTE — ED PROVIDER NOTE - CLINICAL SUMMARY MEDICAL DECISION MAKING FREE TEXT BOX
51M PMH PE (Feb 2020, May 2021), DVT, CAD on 1L of O2 at home complaining of intermittent sharp chest pain for 1 week. Breathing labored, with retractions. Tachypneic, tachycardic. O2 98% on 5L non-rebreather.  Suspcious for PE  Labs, CTA, Heparin bolus  Continue to monitor, f/u after results  Likely admit

## 2021-07-02 NOTE — CONSULT NOTE ADULT - SUBJECTIVE AND OBJECTIVE BOX
HPI:  Patient is a 51yoM with PMH of CAD, MI, b/l PE in Feb 2020 w/ recurrent PE Apr 2021 (discharged on Eliquis), SC trait w/o crisis (on 1L NC at home), now presenting with a 1 week history of shortness of breath, chest pain and racing heart. He reports that the sensation of racing heart, sharp non-radiating chest pain, and Shortness of breath started 7 days ago, and has persistently worsened since then. Reports feeling like he was going to pass out spurring him to come to the ED. He reports these symptoms feel similar to past episodes of PE, and that he has taken his Eliquis as prescribed since prior admission, until he ran out of Eliquis yesterday. Denies fever, chills, pleuritic chest pain, nausea, vomiting, abdominal pain, diarrhea, constipation, recent leg swelling or pain. Reports he goes to [facility] for PCP, but has no history of cancer.     In the ED: afebrile, XJ=916, HD=559/62, RR=18, O2=82% on RA. Patient placed on NRB 15L with improvement in O2 sats to 100%, though with persisting tachypnea in 30s. Escalated to HFNC with stable O2 sats.   Pertinent labs: wbc=25, K=6.0, Cr=1.77 (baseline 0.95 May 2021), Trop T=323, cqvAPY=2550, Tbili=3.4, AST=77  Imaging: CT Angio: shows bilateral segmental/subsegmental PE, pulmonary HTN w/ dilated pulmonary artery and RV enlargement (?CTEPH). Also, new APIGE 0.4cm nodule.  Given 1L NS with improvement in tachycardia. Patient started on Heparin drip, and given Reg insulin and D5 for hyperkalemia.    (02 Jul 2021 20:23)    No Known Allergies    Intolerances    	  MEDICATIONS:  heparin   Injectable 6500 Unit(s) IV Push every 6 hours PRN  heparin   Injectable 3000 Unit(s) IV Push every 6 hours PRN  heparin  Infusion. 1500 Unit(s)/Hr IV Continuous <Continuous>                  PAST MEDICAL & SURGICAL HISTORY:  Myocardial infarct    Bilateral pulmonary embolism  Feb 2020    H/O sickle cell trait    S/P wrist surgery  Left wrist        FAMILY HISTORY:  Family history of cancer in mother (Mother)  Breast cancer    FH: prostate cancer (Father)  Brother, passed away at 54      SOCIAL HISTORY  Marital Status:   Occupation:   Lives with:     SUBSTANCE USE  Tobacco Usage:    Alcohol Usage:   Recreational drugs:     REVIEW OF SYSTEMS:  CONSTITUTIONAL: No fevers, No chills, No fatigue, No weight gain  RESPIRATORY: No shortness of breath, No cough, No wheezing, No hemoptysis  CARDIOVASCULAR: No chest pain. No palpitations, No pleuritic pain  GASTROINTESTINAL: No abdominal pain, No nausea, No vomiting, No hematemesis, No diarrhea No constipation. No melena  GENITOURINARY: No dysuria, No frequency, No incontinence, No hematuria  NEUROLOGICAL: No dizziness, No lightheadedness, No syncope, No LOC, No headache, No numbness or weakness  EXTREMITIES: No Edema, No joint pain, No joint swelling.  SKIN: No diaphoresis. No itching, No rashes, No pressure ulcers  All other review of systems is negative unless indicated above.    T(C): 37.4 (07-02-21 @ 20:45), Max: 37.4 (07-02-21 @ 20:45)  HR: 106 (07-02-21 @ 23:07) (106 - 142)  BP: 109/71 (07-02-21 @ 20:45) (109/71 - 124/85)  RR: 30 (07-02-21 @ 23:07) (18 - 32)  SpO2: 100% (07-02-21 @ 23:07) (82% - 100%)  Wt(kg): --  I&O's Summary      Physical Exam:  General: NAD  Cardiovascular: Normal S1 S2, No JVD, No murmurs, No edema  Respiratory: Lungs clear to auscultation	  Gastrointestinal:  Soft, Non-tender, + BS	  Skin: warm and dry, No rashes, No ecchymoses, No cyanosis	  Extremities:  No clubbing, cyanosis or edema  Vascular: Peripheral pulses palpable 2+ bilaterally    CBC Full  -  ( 02 Jul 2021 15:53 )  WBC Count : 25.46 K/uL  Hemoglobin : 12.9 g/dL  Hematocrit : 35.2 %  Platelet Count - Automated : 236 K/uL  Mean Cell Volume : 84.6 fL  Mean Cell Hemoglobin : 31.0 pg  Mean Cell Hemoglobin Concentration : 36.6 gm/dL  Auto Neutrophil # : 17.85 K/uL  Auto Lymphocyte # : 4.79 K/uL  Auto Monocyte # : 1.96 K/uL  Auto Eosinophil # : 0.00 K/uL  Auto Basophil # : 0.00 K/uL  Auto Neutrophil % : 67.5 %  Auto Lymphocyte % : 18.8 %  Auto Monocyte % : 7.7 %  Auto Eosinophil % : 0.0 %  Auto Basophil % : 0.0 %    07-02    140  |  106  |  19  ----------------------------<  139<H>  4.2   |  23  |  1.52<H>  07-02    140  |  105  |  21  ----------------------------<  99  6.1<H>   |  22  |  1.74<H>    Ca    7.7<L>      02 Jul 2021 22:29  Ca    8.4      02 Jul 2021 17:00    TPro  7.2  /  Alb  4.3  /  TBili  3.3<H>  /  DBili  x   /  AST  53<H>  /  ALT  27  /  AlkPhos  78  07-02  TPro  7.6  /  Alb  4.4  /  TBili  3.4<H>  /  DBili  x   /  AST  77<H>  /  ALT  29  /  AlkPhos  82  07-02    proBNP: Serum Pro-Brain Natriuretic Peptide: 4412 pg/mL (07-02 @ 17:21)    Lipid Profile:   HgA1c:   TSH: Thyroid Stimulating Hormone, Serum: 1.96 uIU/mL (07-02 @ 22:29)    CARDIAC MARKERS:            PREVIOUS DIAGNOSTIC TESTING:        Thank you, if any questions or clinical situation changes please call spectra #[phone].  Attending Attestation to follow   HPI:  Patient is a 51yoM with PMH of CAD, MI, b/l PE in Feb 2020 w/ recurrent PE Apr 2021 (discharged on Eliquis), SC trait w/o crisis (on 1L NC at home), now presenting with a 1 week history of shortness of breath, chest pain and racing heart. He reports that the sensation of racing heart, sharp non-radiating chest pain, and Shortness of breath started 7 days ago, and has persistently worsened since then. Reports feeling like he was going to pass out spurring him to come to the ED. He reports these symptoms feel similar to past episodes of PE, and that he has taken his Eliquis as prescribed since prior admission, until he ran out of Eliquis yesterday. Denies fever, chills, pleuritic chest pain, nausea, vomiting, abdominal pain, diarrhea, constipation, recent leg swelling or pain. Reports [place] [facility] [place], but has no history of cancer.     In the ED: afebrile, XU=548, MC=549/62, RR=18, O2=82% on RA. Patient placed on NRB 15L with improvement in O2 sats to 100%, though with persisting tachypnea in 30s. Escalated to HFNC with stable O2 sats.   Pertinent labs: wbc=25, K=6.0, Cr=1.77 (baseline 0.95 May 2021), Trop T=323, rljRHN=5013, Tbili=3.4, AST=77  Imaging: CT Angio: shows bilateral segmental/subsegmental PE, pulmonary HTN w/ dilated pulmonary artery and RV enlargement (?CTEPH). Also, new PAIGE 0.4cm nodule.  Given 1L NS with improvement in tachycardia. Patient started on Heparin drip, and given Reg insulin and D5 for hyperkalemia.    (02 Jul 2021 20:23)    No Known Allergies    Intolerances  	  MEDICATIONS:  heparin   Injectable 6500 Unit(s) IV Push every 6 hours PRN  heparin   Injectable 3000 Unit(s) IV Push every 6 hours PRN  heparin  Infusion. 1500 Unit(s)/Hr IV Continuous <Continuous>    PAST MEDICAL & SURGICAL HISTORY:  Myocardial infarct  Bilateral pulmonary embolism  Feb 2020  H/O sickle cell trait  S/P wrist surgery  Left wrist    FAMILY HISTORY:  Family history of cancer in mother (Mother)  Breast cancer    FH: prostate cancer (Father)  Brother, passed away at 54    SOCIAL HISTORY  Marital Status:   Occupation:   Lives with:     SUBSTANCE USE  Tobacco Usage:    Alcohol Usage:   Recreational drugs:     REVIEW OF SYSTEMS:  CONSTITUTIONAL: No fevers, No chills, No fatigue, No weight gain  RESPIRATORY: No shortness of breath, No cough, No wheezing, No hemoptysis  CARDIOVASCULAR: No chest pain. No palpitations, No pleuritic pain  GASTROINTESTINAL: No abdominal pain, No nausea, No vomiting, No hematemesis, No diarrhea No constipation. No melena  GENITOURINARY: No dysuria, No frequency, No incontinence, No hematuria  NEUROLOGICAL: No dizziness, No lightheadedness, No syncope, No LOC, No headache, No numbness or weakness  EXTREMITIES: No Edema, No joint pain, No joint swelling.  SKIN: No diaphoresis. No itching, No rashes, No pressure ulcers  All other review of systems is negative unless indicated above.    T(C): 37.4 (07-02-21 @ 20:45), Max: 37.4 (07-02-21 @ 20:45)  HR: 106 (07-02-21 @ 23:07) (106 - 142)  BP: 109/71 (07-02-21 @ 20:45) (109/71 - 124/85)  RR: 30 (07-02-21 @ 23:07) (18 - 32)  SpO2: 100% (07-02-21 @ 23:07) (82% - 100%)    Physical Exam:  General: NAD  Cardiovascular: Normal S1 S2, No JVD, No murmurs, No edema  Respiratory: Lungs clear to auscultation	  Gastrointestinal:  Soft, Non-tender, + BS	  Skin: warm and dry, No rashes, No ecchymoses, No cyanosis	  Extremities:  No clubbing, cyanosis or edema  Vascular: Peripheral pulses palpable 2+ bilaterally    CBC Full  -  ( 02 Jul 2021 15:53 )  WBC Count : 25.46 K/uL  Hemoglobin : 12.9 g/dL  Hematocrit : 35.2 %  Platelet Count - Automated : 236 K/uL  Mean Cell Volume : 84.6 fL  Mean Cell Hemoglobin : 31.0 pg  Mean Cell Hemoglobin Concentration : 36.6 gm/dL  Auto Neutrophil # : 17.85 K/uL  Auto Lymphocyte # : 4.79 K/uL  Auto Monocyte # : 1.96 K/uL  Auto Eosinophil # : 0.00 K/uL  Auto Basophil # : 0.00 K/uL  Auto Neutrophil % : 67.5 %  Auto Lymphocyte % : 18.8 %  Auto Monocyte % : 7.7 %  Auto Eosinophil % : 0.0 %  Auto Basophil % : 0.0 %    07-02    140  |  106  |  19  ----------------------------<  139<H>  4.2   |  23  |  1.52<H>  07-02    140  |  105  |  21  ----------------------------<  99  6.1<H>   |  22  |  1.74<H>    Ca    7.7<L>      02 Jul 2021 22:29  Ca    8.4      02 Jul 2021 17:00    TPro  7.2  /  Alb  4.3  /  TBili  3.3<H>  /  DBili  x   /  AST  53<H>  /  ALT  27  /  AlkPhos  78  07-02  TPro  7.6  /  Alb  4.4  /  TBili  3.4<H>  /  DBili  x   /  AST  77<H>  /  ALT  29  /  AlkPhos  82  07-02    proBNP: Serum Pro-Brain Natriuretic Peptide: 4412 pg/mL (07-02 @ 17:21)  TSH: Thyroid Stimulating Hormone, Serum: 1.96 uIU/mL (07-02 @ 22:29)    PREVIOUS DIAGNOSTIC TESTING:  < from: Transthoracic Echocardiogram (05.02.21 @ 09:17) >  CONCLUSIONS:  1. Normal left ventricular internal dimensions and wall  thicknesses.  2. Normal left ventricular systolic function. No segmental  wall motion abnormalities. Flattening of the  interventricular septum in both systole and diastole is  consistent with right ventricular pressure overload.  3. Right ventricular enlargement with decreased right  ventricular systolic function.  4. Estimated pulmonary artery systolic pressure equals 48  mm Hg, assuming right atrial pressure equals 10  mm Hg,  consistent with mild pulmonary hypertension.  *** Compared with echocardiogram of 2/12/2020, no  significant changes noted.    < end of copied text >    < from: CT Angio Chest PE Protocol w/ IV Cont (07.02.21 @ 16:56) >  IMPRESSION:    Bilateral segmental and subsegmental pulmonary emboli. This was discussed with Dr. Martinez at 5:10 PM on July 2, 2021.    Suggestion of pulmonary hypertension with dilated pulmonary artery and right ventricular enlargement. Consider CTEPH.    New left upper lobe 0.4 cm nodule of uncertain etiology. Follow-up in 3 months is recommended.    < end of copied text >         HPI:  Patient is a 51yoM with PMH of CAD, MI, b/l PE in Feb 2020 w/ recurrent PE Apr 2021 (discharged on Eliquis), SC trait w/o crisis (on 1L NC at home), now presenting with a 1 week history of shortness of breath, chest pain and racing heart. He reports that the sensation of racing heart, sharp non-radiating chest pain, and Shortness of breath started 7 days ago, and has persistently worsened since then. Reports feeling like he was going to pass out spurring him to come to the ED. He reports these symptoms feel similar to past episodes of PE, and that he has taken his Eliquis as prescribed since prior admission, until he ran out of Eliquis yesterday. Denies fever, chills, pleuritic chest pain, nausea, vomiting, abdominal pain, diarrhea, constipation, recent leg swelling or pain. Reports he goes to [facility] for PCP, but has no history of cancer.     In the ED: afebrile, PR=469, YM=242/62, RR=18, O2=82% on RA. Patient placed on NRB 15L with improvement in O2 sats to 100%, though with persisting tachypnea in 30s. Escalated to HFNC with stable O2 sats.   Pertinent labs: wbc=25, K=6.0, Cr=1.77 (baseline 0.95 May 2021), Trop T=323, nxbHNU=7211, Tbili=3.4, AST=77  Imaging: CT Angio: shows bilateral segmental/subsegmental PE, pulmonary HTN w/ dilated pulmonary artery and RV enlargement (?CTEPH). Also, new PAIGE 0.4cm nodule.  Given 1L NS with improvement in tachycardia. Patient started on Heparin drip, and given Reg insulin and D5 for hyperkalemia.    (02 Jul 2021 20:23)    No Known Allergies    Intolerances  	  MEDICATIONS:  heparin   Injectable 6500 Unit(s) IV Push every 6 hours PRN  heparin   Injectable 3000 Unit(s) IV Push every 6 hours PRN  heparin  Infusion. 1500 Unit(s)/Hr IV Continuous <Continuous>    PAST MEDICAL & SURGICAL HISTORY:  Myocardial infarct  Bilateral pulmonary embolism  Feb 2020  H/O sickle cell trait  S/P wrist surgery  Left wrist    FAMILY HISTORY:  Family history of cancer in mother (Mother)  Breast cancer    FH: prostate cancer (Father)  Brother, passed away at 54    REVIEW OF SYSTEMS:  CONSTITUTIONAL: No fevers, No chills, No fatigue, No weight gain  RESPIRATORY: + shortness of breath, No cough, No wheezing, No hemoptysis  CARDIOVASCULAR: No chest pain. No palpitations, No pleuritic pain  GASTROINTESTINAL: No abdominal pain, No nausea, No vomiting, No hematemesis, No diarrhea No constipation. No melena  GENITOURINARY: No dysuria, No frequency, No incontinence, No hematuria  NEUROLOGICAL: No dizziness, No lightheadedness, No syncope, No LOC, No headache, No numbness or weakness  EXTREMITIES: No Edema, No joint pain, No joint swelling.  SKIN: No diaphoresis. No itching, No rashes, No pressure ulcers  All other review of systems is negative unless indicated above.    T(C): 37.4 (07-02-21 @ 20:45), Max: 37.4 (07-02-21 @ 20:45)  HR: 106 (07-02-21 @ 23:07) (106 - 142)  BP: 109/71 (07-02-21 @ 20:45) (109/71 - 124/85)  RR: 30 (07-02-21 @ 23:07) (18 - 32)  SpO2: 100% (07-02-21 @ 23:07) (82% - 100%)    Physical Exam:  General: NAD  Cardiovascular: Normal S1 S2, No JVD, No murmurs, No edema  Respiratory: Lungs coarse to auscultation	  Gastrointestinal:  Soft, Non-tender, + BS	  Skin: warm and dry, No rashes, No ecchymoses, No cyanosis	  Extremities:  No clubbing, cyanosis or edema  Vascular: Peripheral pulses palpable 2+ bilaterally    CBC Full  -  ( 02 Jul 2021 15:53 )  WBC Count : 25.46 K/uL  Hemoglobin : 12.9 g/dL  Hematocrit : 35.2 %  Platelet Count - Automated : 236 K/uL  Mean Cell Volume : 84.6 fL  Mean Cell Hemoglobin : 31.0 pg  Mean Cell Hemoglobin Concentration : 36.6 gm/dL  Auto Neutrophil # : 17.85 K/uL  Auto Lymphocyte # : 4.79 K/uL  Auto Monocyte # : 1.96 K/uL  Auto Eosinophil # : 0.00 K/uL  Auto Basophil # : 0.00 K/uL  Auto Neutrophil % : 67.5 %  Auto Lymphocyte % : 18.8 %  Auto Monocyte % : 7.7 %  Auto Eosinophil % : 0.0 %  Auto Basophil % : 0.0 %    07-02    140  |  106  |  19  ----------------------------<  139<H>  4.2   |  23  |  1.52<H>  07-02    140  |  105  |  21  ----------------------------<  99  6.1<H>   |  22  |  1.74<H>    Ca    7.7<L>      02 Jul 2021 22:29  Ca    8.4      02 Jul 2021 17:00    TPro  7.2  /  Alb  4.3  /  TBili  3.3<H>  /  DBili  x   /  AST  53<H>  /  ALT  27  /  AlkPhos  78  07-02  TPro  7.6  /  Alb  4.4  /  TBili  3.4<H>  /  DBili  x   /  AST  77<H>  /  ALT  29  /  AlkPhos  82  07-02    proBNP: Serum Pro-Brain Natriuretic Peptide: 4412 pg/mL (07-02 @ 17:21)  TSH: Thyroid Stimulating Hormone, Serum: 1.96 uIU/mL (07-02 @ 22:29)    PREVIOUS DIAGNOSTIC TESTING:  < from: Transthoracic Echocardiogram (05.02.21 @ 09:17) >  CONCLUSIONS:  1. Normal left ventricular internal dimensions and wall  thicknesses.  2. Normal left ventricular systolic function. No segmental  wall motion abnormalities. Flattening of the  interventricular septum in both systole and diastole is  consistent with right ventricular pressure overload.  3. Right ventricular enlargement with decreased right  ventricular systolic function.  4. Estimated pulmonary artery systolic pressure equals 48  mm Hg, assuming right atrial pressure equals 10  mm Hg,  consistent with mild pulmonary hypertension.  *** Compared with echocardiogram of 2/12/2020, no  significant changes noted.    < end of copied text >    < from: CT Angio Chest PE Protocol w/ IV Cont (07.02.21 @ 16:56) >  IMPRESSION:    Bilateral segmental and subsegmental pulmonary emboli. This was discussed with Dr. Martinez at 5:10 PM on July 2, 2021.    Suggestion of pulmonary hypertension with dilated pulmonary artery and right ventricular enlargement. Consider CTEPH.    New left upper lobe 0.4 cm nodule of uncertain etiology. Follow-up in 3 months is recommended.    < end of copied text >

## 2021-07-02 NOTE — ED ADULT NURSE NOTE - OBJECTIVE STATEMENT
Pt received in room 10, A&Ox4, ambulatory at baseline. Pt poor historian, extremely vague. Pt arrives on 6L nasal canula, tachypneic MD made aware. Placed on NR 6L. Pt endorses intermittent R/L upper quadrant abd pain, shortness of breath and nausea for 2 week. Pt state she had previous PEP, Xarelto prescribed but has since stopped taking Xarelto. Pt denies headache, vomiting, dizziness, bowel/urinary irregularities and pain at this time. 20G placed in R AC, labs drawn and sent as ordered. Awaiting further orders. Pt Sinus tachycardia on cardiac monitor. Will continue to monitor.

## 2021-07-02 NOTE — ED PROVIDER NOTE - PHYSICAL EXAMINATION
General: Well nourished, in mild distress. AAOx3  HEENT: Head normocephalic, atraumatic. Conjunctiva pink, no scleral icterus. Airway patent. Oral mucosa pink and moist.   Lungs: Breathing labored, retractions noted. Tachypneic CTA b/l. No wheezes or rales noted.  Cardiac: Chest non-tender to palpation. Tachycardic. S1 and S2 intact. No murmurs noted.  Peripheral Vascular: Distal pulses 2+ b/l. No edema noted in the upper or lower extremities b/l.   Abdomen: Obese. No masses noted. Soft, NT, ND. General: Well nourished, in mild distress. AAOx3  HEENT: Head normocephalic, atraumatic. Conjunctiva pink, no scleral icterus. Airway patent. Oral mucosa dry.   Lungs: Breathing labored, retractions noted. Tachypneic CTA b/l. No wheezes or rales noted.  Cardiac: Chest non-tender to palpation. Tachycardic. S1 and S2 intact. No murmurs noted.  Peripheral Vascular: Distal pulses 2+ b/l. No edema noted in the upper or lower extremities b/l.   Abdomen: Obese. No masses noted. Soft, NT, ND.

## 2021-07-02 NOTE — H&P ADULT - PROBLEM SELECTOR PLAN 1
Patient with bilateral recurring PE, in the setting of recently running out of home Eliquis. C/b hypoxemia, elevated trops. Also with elevated acmALA=0693, but without volume overload on exam.  - CT Angio: shows bilateral segmental/subsegmental PE, pulmonary HTN w/ dilated pulmonary artery and RV enlargement (?CTEPH). Also, new PAIGE 0.4cm nodule.  - c/w HFNC at this time, satting 100%. Plan to wean as tolerated to maintain spO2>90%  - Appreciate MICU recs  - 1L NS over 3-4 hrs, with improvement in tachycardia from 140s to low 100s  - c/w Heparin drip, monitor aPTT q6h, adjust dose by nomogram  - Cardiology consult  - TTE to assess heart function / CTEPH Patient with bilateral recurring PE, in the setting of recently running out of home Eliquis. C/b hypoxemia, elevated trops. Also with elevated hxiIDL=7550, but without volume overload on exam.  - CT Angio: shows bilateral segmental/subsegmental PE, pulmonary HTN w/ dilated pulmonary artery and RV enlargement (?CTEPH). Also, new PAIGE 0.4cm nodule.  - c/w HFNC at this time, satting 100%. Plan to wean as tolerated to maintain spO2>90%  - Appreciate MICU recs  - 1L NS over 3-4 hrs, with improvement in tachycardia from 140s to low 100s  - c/w Heparin drip, monitor aPTT q6h, adjust dose by nomogram  - monitor on telemetry with continuous pulse ox  - Cardiology consult  - TTE to assess heart function / CTEPH Patient with bilateral recurring PE, in the setting of recently running out of home Eliquis. C/b hypoxemia, elevated trops. Also with elevated ktmHSM=3329, but without volume overload on exam.  - CT Angio: shows bilateral segmental/subsegmental PE, pulmonary HTN w/ dilated pulmonary artery and RV enlargement (?CTEPH). Also, new PAIGE 0.4cm nodule.  - Appreciate MICU recs  - 1L NS over 3-4 hrs, with improvement in tachycardia from 140s to low 100s  - c/w Heparin drip, monitor aPTT q6h, adjust dose by nomogram  - monitor on telemetry with continuous pulse ox  - Cardiology consult  - TTE to assess heart function / CTEPH  - f/u A1C, lipid profile, TSH

## 2021-07-02 NOTE — H&P ADULT - NSHPREVIEWOFSYSTEMS_GEN_ALL_CORE
REVIEW OF SYSTEMS:  CONSTITUTIONAL: No weakness, fevers or chills  EYES/ENT: No visual changes;  No vertigo or throat pain   NECK: No pain or stiffness  RESPIRATORY: Positive shortness of breath. No cough, wheezing, hemoptysis  CARDIOVASCULAR: Positive chest pain or palpitations  GASTROINTESTINAL: No abdominal or epigastric pain. No nausea, vomiting, or hematemesis; No diarrhea or constipation. No melena or hematochezia.  GENITOURINARY: No dysuria, frequency or hematuria  NEUROLOGICAL: No numbness or weakness  SKIN: No itching, rashes REVIEW OF SYSTEMS:  CONSTITUTIONAL: No weakness, fevers or chills  EYES/ENT: No visual changes;  No vertigo or throat pain   NECK: No pain or stiffness  RESPIRATORY: Positive shortness of breath. No cough, wheezing, hemoptysis  CARDIOVASCULAR: Positive chest pain, resolved; No palpitations; Intermittent LE edema  GASTROINTESTINAL: No abdominal or epigastric pain. No nausea, vomiting, or hematemesis; No diarrhea or constipation. No melena or hematochezia.  GENITOURINARY: No dysuria, frequency or hematuria  NEUROLOGICAL: No numbness, paresthesias or weakness  SKIN: No itching, rashes

## 2021-07-02 NOTE — ED PROVIDER NOTE - OBJECTIVE STATEMENT
51M PMH PE (Feb 2020, May 2021), DVT, CAD on 1L of O2 at home complaining of intermittent sharp chest pain for 1 week. Pt stopped Xarelto on Thursday.  Associated with nausea and SOB. Pain is 10/10, non-pleuritic, non-positional, non-exertional, non-radiating. Denies fever, cough, vomiting, abdominal pain, swelling in the extremities, leg pain, weakness, dizziness.

## 2021-07-02 NOTE — H&P ADULT - PROBLEM SELECTOR PLAN 4
K=6.0  - s/p Regular insulin 5U, D50 for hyperkalemia.   - give Lokelma 10 x1   - trend BMP Cr=1.77 (baseline 0.95 May 2021)  - given 1L NS in ED  - Bladder scan to assess for urinary retention  - avoid nephrotoxic agents  - f/u Ucr, Uurea nitrogen to calculate FEUrea, trend SCr

## 2021-07-02 NOTE — ED PROVIDER NOTE - CONSTITUTIONAL, MLM
Awake, alert, oriented to person, place, time/situation and in moderate respiratory distress. normal...

## 2021-07-02 NOTE — H&P ADULT - PROBLEM SELECTOR PLAN 5
DVT ppx: On Heparin gtt for b/l PE  diet: regular diet K=6.0  - s/p Regular insulin 5U, D50 for hyperkalemia.   - give Lokelma 10 x1   - trend BMP

## 2021-07-03 DIAGNOSIS — R65.10 SYSTEMIC INFLAMMATORY RESPONSE SYNDROME (SIRS) OF NON-INFECTIOUS ORIGIN WITHOUT ACUTE ORGAN DYSFUNCTION: ICD-10-CM

## 2021-07-03 DIAGNOSIS — R94.31 ABNORMAL ELECTROCARDIOGRAM [ECG] [EKG]: ICD-10-CM

## 2021-07-03 LAB
A1C WITH ESTIMATED AVERAGE GLUCOSE RESULT: <4 % — LOW (ref 4–5.6)
ALBUMIN SERPL ELPH-MCNC: 3.7 G/DL — SIGNIFICANT CHANGE UP (ref 3.3–5)
ALP SERPL-CCNC: 64 U/L — SIGNIFICANT CHANGE UP (ref 40–120)
ALT FLD-CCNC: 23 U/L — SIGNIFICANT CHANGE UP (ref 4–41)
ANION GAP SERPL CALC-SCNC: 13 MMOL/L — SIGNIFICANT CHANGE UP (ref 7–14)
APTT BLD: 51.8 SEC — HIGH (ref 27–36.3)
APTT BLD: 63.7 SEC — HIGH (ref 27–36.3)
APTT BLD: 66.6 SEC — HIGH (ref 27–36.3)
AST SERPL-CCNC: 43 U/L — HIGH (ref 4–40)
BASE EXCESS BLDV CALC-SCNC: -0.6 MMOL/L — SIGNIFICANT CHANGE UP (ref -3–2)
BASOPHILS # BLD AUTO: 0.09 K/UL — SIGNIFICANT CHANGE UP (ref 0–0.2)
BASOPHILS NFR BLD AUTO: 0.4 % — SIGNIFICANT CHANGE UP (ref 0–2)
BILIRUB SERPL-MCNC: 2.7 MG/DL — HIGH (ref 0.2–1.2)
BLOOD GAS VENOUS - CREATININE: SIGNIFICANT CHANGE UP MG/DL (ref 0.5–1.3)
BLOOD GAS VENOUS COMPREHENSIVE RESULT: SIGNIFICANT CHANGE UP
BUN SERPL-MCNC: 19 MG/DL — SIGNIFICANT CHANGE UP (ref 7–23)
CALCIUM SERPL-MCNC: 7.9 MG/DL — LOW (ref 8.4–10.5)
CHLORIDE BLDV-SCNC: 108 MMOL/L — SIGNIFICANT CHANGE UP (ref 96–108)
CHLORIDE SERPL-SCNC: 106 MMOL/L — SIGNIFICANT CHANGE UP (ref 98–107)
CHOLEST SERPL-MCNC: 91 MG/DL — SIGNIFICANT CHANGE UP
CK MB BLD-MCNC: 2.9 % — HIGH (ref 0–2.5)
CK MB CFR SERPL CALC: 8 NG/ML — HIGH
CK SERPL-CCNC: 274 U/L — HIGH (ref 30–200)
CO2 SERPL-SCNC: 20 MMOL/L — LOW (ref 22–31)
COVID-19 SPIKE DOMAIN AB INTERP: POSITIVE
COVID-19 SPIKE DOMAIN ANTIBODY RESULT: >250 U/ML — HIGH
CREAT SERPL-MCNC: 1.37 MG/DL — HIGH (ref 0.5–1.3)
EOSINOPHIL # BLD AUTO: 0.12 K/UL — SIGNIFICANT CHANGE UP (ref 0–0.5)
EOSINOPHIL NFR BLD AUTO: 0.6 % — SIGNIFICANT CHANGE UP (ref 0–6)
ESTIMATED AVERAGE GLUCOSE: <68 — SIGNIFICANT CHANGE UP
GAS PNL BLDV: 140 MMOL/L — SIGNIFICANT CHANGE UP (ref 136–146)
GLUCOSE BLDV-MCNC: 93 MG/DL — SIGNIFICANT CHANGE UP (ref 70–99)
GLUCOSE SERPL-MCNC: 94 MG/DL — SIGNIFICANT CHANGE UP (ref 70–99)
HCO3 BLDV-SCNC: 23 MMOL/L — SIGNIFICANT CHANGE UP (ref 20–27)
HCT VFR BLD CALC: 29.1 % — LOW (ref 39–50)
HCT VFR BLDA CALC: 32.9 % — LOW (ref 39–51)
HDLC SERPL-MCNC: 25 MG/DL — LOW
HGB BLD CALC-MCNC: 10.7 G/DL — LOW (ref 13–17)
HGB BLD-MCNC: 10.6 G/DL — LOW (ref 13–17)
IANC: 13.64 K/UL — HIGH (ref 1.5–8.5)
IMM GRANULOCYTES NFR BLD AUTO: 1.3 % — SIGNIFICANT CHANGE UP (ref 0–1.5)
LACTATE BLDV-MCNC: 1.7 MMOL/L — SIGNIFICANT CHANGE UP (ref 0.5–2)
LIPID PNL WITH DIRECT LDL SERPL: 42 MG/DL — SIGNIFICANT CHANGE UP
LYMPHOCYTES # BLD AUTO: 20.7 % — SIGNIFICANT CHANGE UP (ref 13–44)
LYMPHOCYTES # BLD AUTO: 4.21 K/UL — HIGH (ref 1–3.3)
MAGNESIUM SERPL-MCNC: 2 MG/DL — SIGNIFICANT CHANGE UP (ref 1.6–2.6)
MCHC RBC-ENTMCNC: 31.4 PG — SIGNIFICANT CHANGE UP (ref 27–34)
MCHC RBC-ENTMCNC: 36.4 GM/DL — HIGH (ref 32–36)
MCV RBC AUTO: 86.1 FL — SIGNIFICANT CHANGE UP (ref 80–100)
MONOCYTES # BLD AUTO: 1.99 K/UL — HIGH (ref 0–0.9)
MONOCYTES NFR BLD AUTO: 9.8 % — SIGNIFICANT CHANGE UP (ref 2–14)
NEUTROPHILS # BLD AUTO: 13.64 K/UL — HIGH (ref 1.8–7.4)
NEUTROPHILS NFR BLD AUTO: 67.2 % — SIGNIFICANT CHANGE UP (ref 43–77)
NON HDL CHOLESTEROL: 66 MG/DL — SIGNIFICANT CHANGE UP
NRBC # BLD: 23 /100 WBCS — SIGNIFICANT CHANGE UP
NRBC # FLD: 4.73 K/UL — HIGH
PCO2 BLDV: 45 MMHG — SIGNIFICANT CHANGE UP (ref 41–51)
PH BLDV: 7.35 — SIGNIFICANT CHANGE UP (ref 7.32–7.43)
PHOSPHATE SERPL-MCNC: 4.2 MG/DL — SIGNIFICANT CHANGE UP (ref 2.5–4.5)
PLATELET # BLD AUTO: 216 K/UL — SIGNIFICANT CHANGE UP (ref 150–400)
PO2 BLDV: 38 MMHG — SIGNIFICANT CHANGE UP (ref 35–40)
POTASSIUM BLDV-SCNC: 4 MMOL/L — SIGNIFICANT CHANGE UP (ref 3.4–4.5)
POTASSIUM SERPL-MCNC: 4 MMOL/L — SIGNIFICANT CHANGE UP (ref 3.5–5.3)
POTASSIUM SERPL-SCNC: 4 MMOL/L — SIGNIFICANT CHANGE UP (ref 3.5–5.3)
PROT SERPL-MCNC: 6.1 G/DL — SIGNIFICANT CHANGE UP (ref 6–8.3)
RBC # BLD: 3.38 M/UL — LOW (ref 4.2–5.8)
RBC # FLD: 22.7 % — HIGH (ref 10.3–14.5)
SAO2 % BLDV: 51.6 % — LOW (ref 60–85)
SARS-COV-2 IGG+IGM SERPL QL IA: >250 U/ML — HIGH
SARS-COV-2 IGG+IGM SERPL QL IA: POSITIVE
SODIUM SERPL-SCNC: 139 MMOL/L — SIGNIFICANT CHANGE UP (ref 135–145)
TRIGL SERPL-MCNC: 118 MG/DL — SIGNIFICANT CHANGE UP
TROPONIN T, HIGH SENSITIVITY RESULT: 450 NG/L — CRITICAL HIGH
WBC # BLD: 20.31 K/UL — HIGH (ref 3.8–10.5)
WBC # FLD AUTO: 20.31 K/UL — HIGH (ref 3.8–10.5)

## 2021-07-03 PROCEDURE — 99232 SBSQ HOSP IP/OBS MODERATE 35: CPT

## 2021-07-03 PROCEDURE — 12345: CPT | Mod: NC

## 2021-07-03 PROCEDURE — 99223 1ST HOSP IP/OBS HIGH 75: CPT | Mod: GC

## 2021-07-03 PROCEDURE — 93010 ELECTROCARDIOGRAM REPORT: CPT

## 2021-07-03 RX ORDER — ACETAMINOPHEN 500 MG
650 TABLET ORAL ONCE
Refills: 0 | Status: COMPLETED | OUTPATIENT
Start: 2021-07-03 | End: 2021-07-03

## 2021-07-03 RX ORDER — ACETAMINOPHEN 500 MG
650 TABLET ORAL EVERY 6 HOURS
Refills: 0 | Status: DISCONTINUED | OUTPATIENT
Start: 2021-07-03 | End: 2021-07-08

## 2021-07-03 RX ADMIN — Medication 650 MILLIGRAM(S): at 23:52

## 2021-07-03 RX ADMIN — Medication 650 MILLIGRAM(S): at 09:30

## 2021-07-03 RX ADMIN — HEPARIN SODIUM 1700 UNIT(S)/HR: 5000 INJECTION INTRAVENOUS; SUBCUTANEOUS at 12:59

## 2021-07-03 RX ADMIN — HEPARIN SODIUM 1700 UNIT(S)/HR: 5000 INJECTION INTRAVENOUS; SUBCUTANEOUS at 20:16

## 2021-07-03 RX ADMIN — Medication 650 MILLIGRAM(S): at 23:22

## 2021-07-03 RX ADMIN — HEPARIN SODIUM 1700 UNIT(S)/HR: 5000 INJECTION INTRAVENOUS; SUBCUTANEOUS at 05:52

## 2021-07-03 RX ADMIN — Medication 650 MILLIGRAM(S): at 08:43

## 2021-07-03 NOTE — PROGRESS NOTE ADULT - PROBLEM SELECTOR PLAN 1
Patient with bilateral recurring PE, in the setting of recently running out of home Eliquis. C/b hypoxemia, elevated trops. Also with elevated fjnVWZ=6260, but without volume overload on exam.  - CT Angio: shows bilateral segmental/subsegmental PE, pulmonary HTN w/ dilated pulmonary artery and RV enlargement (?CTEPH). Also, new PAIGE 0.4cm nodule.  - Appreciate MICU recs  -Hematology consult   - c/w Heparin drip, monitor aPTT q6h, adjust dose by nomogram  - monitor on telemetry with continuous pulse ox  - Cardiology consulted, recs leg doppler and possibly switching to Pradaxa on DC  - TTE to assess heart function / CTEPH Patient with bilateral recurring PE, in the setting of recently running out of home Eliquis. C/b hypoxemia, elevated trops. Also with elevated fntERP=0115, but without volume overload on exam.  - CT Angio: shows bilateral segmental/subsegmental PE, pulmonary HTN w/ dilated pulmonary artery and RV enlargement (?CTEPH). Also, new PAIGE 0.4cm nodule.  - Appreciate MICU recs  -Hematology consult (Emailed)  - c/w Heparin drip, monitor aPTT q6h, adjust dose by nomogram  - monitor on telemetry with continuous pulse ox  - Cardiology consulted, recs leg doppler and possibly switching to Pradaxa on DC  - TTE to assess heart function / CTEPH

## 2021-07-03 NOTE — PROGRESS NOTE ADULT - SUBJECTIVE AND OBJECTIVE BOX
Dr. Tory Yang  Pager [phone]    PROGRESS NOTE:     Patient is a 51y old  Male who presents with a chief complaint of Pulmonary Embolism (2021 23:35)      SUBJECTIVE / OVERNIGHT EVENTS: pt denies chest pain , reports he finished eliquis on Wed, on home o2 1L  ADDITIONAL REVIEW OF SYSTEMS: breathing okay on high flow    MEDICATIONS  (STANDING):  heparin  Infusion. 1500 Unit(s)/Hr (15 mL/Hr) IV Continuous <Continuous>    MEDICATIONS  (PRN):  heparin   Injectable 6500 Unit(s) IV Push every 6 hours PRN For aPTT less than 40  heparin   Injectable 3000 Unit(s) IV Push every 6 hours PRN For aPTT between 40 - 57      CAPILLARY BLOOD GLUCOSE  86 (2021 19:11)      POCT Blood Glucose.: 86 mg/dL (2021 19:08)  POCT Blood Glucose.: 92 mg/dL (2021 17:26)    I&O's Summary      PHYSICAL EXAM:  Vital Signs Last 24 Hrs  T(C): 36.4 (2021 11:20), Max: 37.4 (2021 20:45)  T(F): 97.5 (2021 11:20), Max: 99.4 (2021 03:20)  HR: 99 (2021 14:04) (93 - 118)  BP: 107/68 (2021 11:20) (106/71 - 124/85)  BP(mean): --  RR: 24 (2021 14:04) (20 - 32)  SpO2: 99% (2021 14:04) (97% - 100%)  CONSTITUTIONAL: NAD, well-developed, on high flow  RESPIRATORY: tachypneic;; lungs are clear to auscultation bilaterally  CARDIOVASCULAR: Regular rate and rhythm, normal S1 and S2, no murmur/rub/gallop; No lower extremity edema; Peripheral pulses are 2+ bilaterally  ABDOMEN: Nontender to palpation, normoactive bowel sounds, no rebound/guarding; No hepatosplenomegaly  MUSCULOSKELETAL: no clubbing or cyanosis of digits; no joint swelling or tenderness to palpation  PSYCH: A+O to person, place, and time; affect appropriate    LABS:                        10.6   20.31 )-----------( 216      ( 2021 05:33 )             29.1     07-03    139  |  106  |  19  ----------------------------<  94  4.0   |  20<L>  |  1.37<H>    Ca    7.9<L>      2021 05:33  Phos  4.2     07-03  Mg     2.00     07-03    TPro  6.1  /  Alb  3.7  /  TBili  2.7<H>  /  DBili  x   /  AST  43<H>  /  ALT  23  /  AlkPhos  64  07-03    PT/INR - ( 2021 15:53 )   PT: 15.3 sec;   INR: 1.36 ratio         PTT - ( 2021 12:35 )  PTT:66.6 sec  CARDIAC MARKERS ( 2021 22:29 )  x     / x     / 281 U/L / x     / 10.6 ng/mL  CARDIAC MARKERS ( 2021 15:55 )  x     / x     / 274 U/L / x     / 8.0 ng/mL      Urinalysis Basic - ( 2021 22:29 )    Color: Yellow / Appearance: Clear / S.022 / pH: x  Gluc: x / Ketone: Negative  / Bili: Negative / Urobili: 6 mg/dL   Blood: x / Protein: 30 mg/dL / Nitrite: Negative   Leuk Esterase: Negative / RBC: 1 /HPF / WBC 2 /HPF   Sq Epi: x / Non Sq Epi: 1 /HPF / Bacteria: Negative          RADIOLOGY & ADDITIONAL TESTS:  Results Reviewed:   Imaging Personally Reviewed:  < from: CT Angio Chest PE Protocol w/ IV Cont (21 @ 16:56) >  IMPRESSION:    Bilateral segmental and subsegmental pulmonary emboli.     Suggestion of pulmonary hypertension with dilated pulmonary artery and right ventricular enlargement. Consider CTEPH.    New left upper lobe 0.4 cm nodule of uncertain etiology. Follow-up in 3 months is recommended.          Electrocardiogram Personally Reviewed:    COORDINATION OF CARE:  Care Discussed with Consultants/Other Providers [Y/N]:  Prior or Outpatient Records Reviewed [Y/N]:

## 2021-07-04 LAB
ANION GAP SERPL CALC-SCNC: 14 MMOL/L — SIGNIFICANT CHANGE UP (ref 7–14)
APTT BLD: 58.9 SEC — HIGH (ref 27–36.3)
BILIRUB DIRECT SERPL-MCNC: 0.6 MG/DL — HIGH (ref 0–0.2)
BILIRUB INDIRECT FLD-MCNC: 1.4 MG/DL — HIGH (ref 0–1)
BILIRUB SERPL-MCNC: 2 MG/DL — HIGH (ref 0.2–1.2)
BUN SERPL-MCNC: 14 MG/DL — SIGNIFICANT CHANGE UP (ref 7–23)
CALCIUM SERPL-MCNC: 8.3 MG/DL — LOW (ref 8.4–10.5)
CHLORIDE SERPL-SCNC: 106 MMOL/L — SIGNIFICANT CHANGE UP (ref 98–107)
CO2 SERPL-SCNC: 19 MMOL/L — LOW (ref 22–31)
CREAT SERPL-MCNC: 1.09 MG/DL — SIGNIFICANT CHANGE UP (ref 0.5–1.3)
GLUCOSE SERPL-MCNC: 86 MG/DL — SIGNIFICANT CHANGE UP (ref 70–99)
HCT VFR BLD CALC: 29.4 % — LOW (ref 39–50)
HGB BLD-MCNC: 10.8 G/DL — LOW (ref 13–17)
MAGNESIUM SERPL-MCNC: 2.1 MG/DL — SIGNIFICANT CHANGE UP (ref 1.6–2.6)
MCHC RBC-ENTMCNC: 32.1 PG — SIGNIFICANT CHANGE UP (ref 27–34)
MCHC RBC-ENTMCNC: 36.7 GM/DL — HIGH (ref 32–36)
MCV RBC AUTO: 87.5 FL — SIGNIFICANT CHANGE UP (ref 80–100)
NRBC # BLD: 32 /100 WBCS — SIGNIFICANT CHANGE UP
NRBC # FLD: 5.2 K/UL — HIGH
PHOSPHATE SERPL-MCNC: 3.7 MG/DL — SIGNIFICANT CHANGE UP (ref 2.5–4.5)
PLATELET # BLD AUTO: 263 K/UL — SIGNIFICANT CHANGE UP (ref 150–400)
POTASSIUM SERPL-MCNC: 3.9 MMOL/L — SIGNIFICANT CHANGE UP (ref 3.5–5.3)
POTASSIUM SERPL-SCNC: 3.9 MMOL/L — SIGNIFICANT CHANGE UP (ref 3.5–5.3)
RBC # BLD: 3.36 M/UL — LOW (ref 4.2–5.8)
RBC # FLD: 23.1 % — HIGH (ref 10.3–14.5)
SODIUM SERPL-SCNC: 139 MMOL/L — SIGNIFICANT CHANGE UP (ref 135–145)
TROPONIN T, HIGH SENSITIVITY RESULT: 369 NG/L — CRITICAL HIGH
WBC # BLD: 16.49 K/UL — HIGH (ref 3.8–10.5)
WBC # FLD AUTO: 16.49 K/UL — HIGH (ref 3.8–10.5)

## 2021-07-04 PROCEDURE — 99233 SBSQ HOSP IP/OBS HIGH 50: CPT

## 2021-07-04 PROCEDURE — 93306 TTE W/DOPPLER COMPLETE: CPT | Mod: 26

## 2021-07-04 PROCEDURE — 99223 1ST HOSP IP/OBS HIGH 75: CPT | Mod: GC

## 2021-07-04 RX ORDER — FOLIC ACID 0.8 MG
1 TABLET ORAL DAILY
Refills: 0 | Status: DISCONTINUED | OUTPATIENT
Start: 2021-07-04 | End: 2021-07-08

## 2021-07-04 RX ADMIN — HEPARIN SODIUM 1700 UNIT(S)/HR: 5000 INJECTION INTRAVENOUS; SUBCUTANEOUS at 09:05

## 2021-07-04 RX ADMIN — Medication 650 MILLIGRAM(S): at 10:08

## 2021-07-04 RX ADMIN — Medication 1 MILLIGRAM(S): at 11:03

## 2021-07-04 RX ADMIN — Medication 650 MILLIGRAM(S): at 19:26

## 2021-07-04 RX ADMIN — Medication 650 MILLIGRAM(S): at 09:10

## 2021-07-04 RX ADMIN — Medication 650 MILLIGRAM(S): at 20:26

## 2021-07-04 NOTE — PROGRESS NOTE ADULT - PROBLEM SELECTOR PLAN 1
Patient with bilateral recurring PE, in the setting of recently running out of home Eliquis. C/b hypoxemia, elevated trops. Also with elevated [id number], but without volume overload on exam.  - CT Angio: shows bilateral segmental/subsegmental PE, pulmonary HTN w/ dilated pulmonary artery and RV enlargement (?CTEPH). Also, new PAIGE 0.4cm nodule.  - apprec MICU recs  -apprec hematology consult recs, will need lifetime anticoagulation, favor coumadin over doac, folic acid, labs ordered   - c/w Heparin drip, monitor aPTT q6h, adjust dose by nomogram  - monitor on telemetry with continuous pulse ox  - Cardiology consulted, recs leg doppler and possible switch to pradaxa, but hemonc recs coumadin  - TTE to assess heart function / CTEPH, called echo lab to expedite study

## 2021-07-04 NOTE — CONSULT NOTE ADULT - SUBJECTIVE AND OBJECTIVE BOX
HPI:  Patient is a 51yoM with PMH of CAD, MI, b/l PE in Feb 2020 w/ recurrent PE Apr 2021 (discharged on Eliquis), SC trait w/o crisis (on 1L NC at home), now presenting with a 1 week history of shortness of breath, chest pain and racing heart. He reports that the sensation of racing heart, sharp non-radiating chest pain, and Shortness of breath started 7 days ago, and has persistently worsened since then. Reports feeling like he was going to pass out spurring him to come to the ED. He reports these symptoms feel similar to past episodes of PE, and that he has taken his Eliquis as prescribed since prior admission, until he ran out of Eliquis yesterday. Denies fever, chills, pleuritic chest pain, nausea, vomiting, abdominal pain, diarrhea, constipation, recent leg swelling or pain. Reports he goes to [facility] [place] for PCP, but has no history of cancer.     In the ED: afebrile, US=590, LL=859/62, RR=18, O2=82% on RA. Patient placed on NRB 15L with improvement in O2 sats to 100%, though with persisting tachypnea in 30s. Escalated to HFNC with stable O2 sats.   Pertinent labs: wbc=25, K=6.0, Cr=1.77 (baseline 0.95 May 2021), Trop T=323, alpHCR=9861, Tbili=3.4, AST=77  Imaging: CT Angio: shows bilateral segmental/subsegmental PE, pulmonary HTN w/ dilated pulmonary artery and RV enlargement (?CTEPH). Also, new PAIGE 0.4cm nodule.  Given 1L NS with improvement in tachycardia. Patient started on Heparin drip, and given Reg insulin and D5 for hyperkalemia.    (02 Jul 2021 20:23)      PAST MEDICAL & SURGICAL HISTORY:  Myocardial infarct    Bilateral pulmonary embolism  Feb 2020    H/O sickle cell trait    S/P wrist surgery  Left wrist        Allergies    No Known Allergies    Intolerances        MEDICATIONS  (STANDING):  heparin  Infusion. 1500 Unit(s)/Hr (15 mL/Hr) IV Continuous <Continuous>    MEDICATIONS  (PRN):  acetaminophen   Tablet .. 650 milliGRAM(s) Oral every 6 hours PRN Temp greater or equal to 38C (100.4F), Mild Pain (1 - 3), Moderate Pain (4 - 6)  heparin   Injectable 6500 Unit(s) IV Push every 6 hours PRN For aPTT less than 40  heparin   Injectable 3000 Unit(s) IV Push every 6 hours PRN For aPTT between 40 - 57      FAMILY HISTORY:  Family history of cancer in mother (Mother)  Breast cancer    FH: prostate cancer (Father)  Brother, passed away at 54        SOCIAL HISTORY: No EtOH, no tobacco    REVIEW OF SYSTEMS:    CONSTITUTIONAL: No weakness, fevers or chills  EYES/ENT: No visual changes;  No vertigo or throat pain   NECK: No pain or stiffness  RESPIRATORY: No cough, wheezing, hemoptysis; No shortness of breath  CARDIOVASCULAR: No chest pain or palpitations  GASTROINTESTINAL: No abdominal or epigastric pain. No nausea, vomiting, or hematemesis; No diarrhea or constipation. No melena or hematochezia.  GENITOURINARY: No dysuria, frequency or hematuria  NEUROLOGICAL: No numbness or weakness  SKIN: No itching, burning, rashes, or lesions   All other review of systems is negative unless indicated above.        T(F): 97.8 (07-04-21 @ 05:02), Max: 97.9 (07-03-21 @ 21:49)  HR: 98 (07-04-21 @ 06:23)  BP: 130/81 (07-04-21 @ 05:02)  RR: 20 (07-04-21 @ 06:23)  SpO2: 100% (07-04-21 @ 06:23)  Wt(kg): --    GENERAL: NAD, well-developed  HEAD:  Atraumatic, Normocephalic  EYES: EOMI, PERRLA, conjunctiva and sclera clear  NECK: Supple, No JVD  CHEST/LUNG: Clear to auscultation bilaterally; No wheeze  HEART: Regular rate and rhythm; No murmurs, rubs, or gallops  ABDOMEN: Soft, Nontender, Nondistended; Bowel sounds present  EXTREMITIES:  2+ Peripheral Pulses, No clubbing, cyanosis, or edema  NEUROLOGY: non-focal  SKIN: No rashes or lesions                          10.8   16.49 )-----------( 263      ( 04 Jul 2021 07:19 )             29.4       07-04    139  |  106  |  14  ----------------------------<  86  3.9   |  19<L>  |  1.09    Ca    8.3<L>      04 Jul 2021 07:19  Phos  3.7     07-04  Mg     2.10     07-04    TPro  6.1  /  Alb  3.7  /  TBili  2.7<H>  /  DBili  x   /  AST  43<H>  /  ALT  23  /  AlkPhos  64  07-03      Phosphorus Level, Serum: 3.7 mg/dL (07-04 @ 07:19)  Magnesium, Serum: 2.10 mg/dL (07-04 @ 07:19)      PT/INR - ( 02 Jul 2021 15:53 )   PT: 15.3 sec;   INR: 1.36 ratio         PTT - ( 04 Jul 2021 07:19 )  PTT:58.9 sec     HPI:  Patient is a 51yoM with PMH of CAD, MI, b/l PE in Feb 2020 w/ recurrent PE Apr 2021 (discharged on Eliquis), SC trait w/o crisis (on 1L NC at home), now presenting with a 1 week history of shortness of breath, chest pain and racing heart. He reports that the sensation of racing heart, sharp non-radiating chest pain, and Shortness of breath started 7 days ago, and has persistently worsened since then. Reports feeling like he was going to pass out spurring him to come to the ED. He reports these symptoms feel similar to past episodes of PE, and that he has taken his Eliquis as prescribed since prior admission, until he ran out of Eliquis yesterday. Denies fever, chills, pleuritic chest pain, nausea, vomiting, abdominal pain, diarrhea, constipation, recent leg swelling or pain. Reports he goes to [facility] [place] for PCP, but has no history of cancer.     In the ED: afebrile, KM=348, OA=099/62, RR=18, O2=82% on RA. Patient placed on NRB 15L with improvement in O2 sats to 100%, though with persisting tachypnea in 30s. Escalated to HFNC with stable O2 sats.   Pertinent labs: wbc=25, K=6.0, Cr=1.77 (baseline 0.95 May 2021), Trop T=323, jhjSIM=[id number], Tbili=3.4, AST=77  Imaging: CT Angio: shows bilateral segmental/subsegmental PE, pulmonary HTN w/ dilated pulmonary artery and RV enlargement (?CTEPH). Also, new PAIGE 0.4cm nodule.  Given 1L NS with improvement in tachycardia. Patient started on Heparin drip, and given Reg insulin and D5 for hyperkalemia.    (02 Jul 2021 20:23)      PAST MEDICAL & SURGICAL HISTORY:  Myocardial infarct    Bilateral pulmonary embolism  Feb 2020    H/O sickle cell trait    S/P wrist surgery  Left wrist        Allergies    No Known Allergies    Intolerances        MEDICATIONS  (STANDING):  heparin  Infusion. 1500 Unit(s)/Hr (15 mL/Hr) IV Continuous <Continuous>    MEDICATIONS  (PRN):  acetaminophen   Tablet .. 650 milliGRAM(s) Oral every 6 hours PRN Temp greater or equal to 38C (100.4F), Mild Pain (1 - 3), Moderate Pain (4 - 6)  heparin   Injectable 6500 Unit(s) IV Push every 6 hours PRN For aPTT less than 40  heparin   Injectable 3000 Unit(s) IV Push every 6 hours PRN For aPTT between 40 - 57      FAMILY HISTORY:  Family history of cancer in mother (Mother)  Breast cancer    FH: prostate cancer (Father)  Brother, passed away at 54        SOCIAL HISTORY: No EtOH, no tobacco    REVIEW OF SYSTEMS:    CONSTITUTIONAL: No weakness, fevers or chills  EYES/ENT: No visual changes;  No vertigo or throat pain   NECK: No pain or stiffness  RESPIRATORY: No cough, wheezing, hemoptysis;  + shortness of breath  CARDIOVASCULAR: + chest pain and palpitations  GASTROINTESTINAL: No abdominal or epigastric pain. No nausea, vomiting, or hematemesis; No diarrhea or constipation. No melena or hematochezia.  GENITOURINARY: No dysuria, frequency or hematuria  NEUROLOGICAL: No numbness or weakness  SKIN: No itching, burning, rashes, or lesions   All other review of systems is negative unless indicated above.        T(F): 97.8 (07-04-21 @ 05:02), Max: 97.9 (07-03-21 @ 21:49)  HR: 98 (07-04-21 @ 06:23)  BP: 130/81 (07-04-21 @ 05:02)  RR: 20 (07-04-21 @ 06:23)  SpO2: 100% (07-04-21 @ 06:23)  Wt(kg): --    GENERAL: NAD, well-developed on high flow nasal cannula   HEAD:  Atraumatic, Normocephalic  EYES: EOMI, PERRLA, conjunctiva and sclera clear  NECK: Supple, No JVD  CHEST/LUNG: Clear to auscultation bilaterally; No wheeze  HEART: Regular rate and rhythm; No murmurs, rubs, or gallops  ABDOMEN: Soft, Nontender, Nondistended; Bowel sounds present  EXTREMITIES:  2+ Peripheral Pulses, No clubbing, cyanosis, or edema  NEUROLOGY: non-focal  SKIN: No rashes or lesions                          10.8   16.49 )-----------( 263      ( 04 Jul 2021 07:19 )             29.4       07-04    139  |  106  |  14  ----------------------------<  86  3.9   |  19<L>  |  1.09    Ca    8.3<L>      04 Jul 2021 07:19  Phos  3.7     07-04  Mg     2.10     07-04    TPro  6.1  /  Alb  3.7  /  TBili  2.7<H>  /  DBili  x   /  AST  43<H>  /  ALT  23  /  AlkPhos  64  07-03      Phosphorus Level, Serum: 3.7 mg/dL (07-04 @ 07:19)  Magnesium, Serum: 2.10 mg/dL (07-04 @ 07:19)      PT/INR - ( 02 Jul 2021 15:53 )   PT: 15.3 sec;   INR: 1.36 ratio         PTT - ( 04 Jul 2021 07:19 )  PTT:58.9 sec

## 2021-07-04 NOTE — PROGRESS NOTE ADULT - SUBJECTIVE AND OBJECTIVE BOX
Dr. Tory Yang  Pager [phone]    PROGRESS NOTE:     Patient is a 51y old  Male who presents with a chief complaint of Pulmonary Embolism (2021 08:37)      SUBJECTIVE / OVERNIGHT EVENTS: pt denies chest pain, breathing okay on high flow  ADDITIONAL REVIEW OF SYSTEMS: afebrile     MEDICATIONS  (STANDING):  folic acid 1 milliGRAM(s) Oral daily  heparin  Infusion. 1500 Unit(s)/Hr (15 mL/Hr) IV Continuous <Continuous>    MEDICATIONS  (PRN):  acetaminophen   Tablet .. 650 milliGRAM(s) Oral every 6 hours PRN Temp greater or equal to 38C (100.4F), Mild Pain (1 - 3), Moderate Pain (4 - 6)  heparin   Injectable 6500 Unit(s) IV Push every 6 hours PRN For aPTT less than 40  heparin   Injectable 3000 Unit(s) IV Push every 6 hours PRN For aPTT between 40 - 57      CAPILLARY BLOOD GLUCOSE        I&O's Summary    2021 07:01  -  2021 07:00  --------------------------------------------------------  IN: 187 mL / OUT: 1575 mL / NET: -1388 mL        PHYSICAL EXAM:  Vital Signs Last 24 Hrs  T(C): 36.6 (2021 05:02), Max: 36.6 (2021 15:00)  T(F): 97.8 (2021 05:02), Max: 97.9 (2021 21:49)  HR: 103 (2021 10:33) (87 - 103)  BP: 130/81 (2021 05:02) (106/60 - 130/81)  BP(mean): --  RR: 21 (2021 10:33) (20 - 24)  SpO2: 95% (2021 10:33) (94% - 100%)  CONSTITUTIONAL: NAD, well-developed, on high flow  RESPIRATORY: tachypneic;; lungs are clear to auscultation bilaterally  CARDIOVASCULAR: Regular rate and rhythm, normal S1 and S2, no murmur/rub/gallop; No lower extremity edema; Peripheral pulses are 2+ bilaterally  ABDOMEN: Nontender to palpation, normoactive bowel sounds, no rebound/guarding; No hepatosplenomegaly  MUSCULOSKELETAL: no clubbing or cyanosis of digits; no joint swelling or tenderness to palpation  PSYCH: A+O to person, place, and time; affect appropriate    LABS:                        10.8   16.49 )-----------( 263      ( 2021 07:19 )             29.4     07-04    139  |  106  |  14  ----------------------------<  86  3.9   |  19<L>  |  1.09    Ca    8.3<L>      2021 07:19  Phos  3.7     07-04  Mg     2.10     07-04    TPro  x   /  Alb  x   /  TBili  2.0<H>  /  DBili  0.6<H>  /  AST  x   /  ALT  x   /  AlkPhos  x   07-04    PT/INR - ( 2021 15:53 )   PT: 15.3 sec;   INR: 1.36 ratio         PTT - ( 2021 07:19 )  PTT:58.9 sec  CARDIAC MARKERS ( 2021 22:29 )  x     / x     / 281 U/L / x     / 10.6 ng/mL  CARDIAC MARKERS ( 2021 15:55 )  x     / x     / 274 U/L / x     / 8.0 ng/mL      Urinalysis Basic - ( 2021 22:29 )    Color: Yellow / Appearance: Clear / S.022 / pH: x  Gluc: x / Ketone: Negative  / Bili: Negative / Urobili: 6 mg/dL   Blood: x / Protein: 30 mg/dL / Nitrite: Negative   Leuk Esterase: Negative / RBC: 1 /HPF / WBC 2 /HPF   Sq Epi: x / Non Sq Epi: 1 /HPF / Bacteria: Negative          RADIOLOGY & ADDITIONAL TESTS:  Results Reviewed:   Imaging Personally Reviewed:  Electrocardiogram Personally Reviewed:    COORDINATION OF CARE:  Care Discussed with Consultants/Other Providers [Y/N]: kirstie Zhou hematology w/u,, c/w heparin gtt, wean o2  Prior or Outpatient Records Reviewed [Y/N]:

## 2021-07-04 NOTE — CONSULT NOTE ADULT - ASSESSMENT
51yoM with PMH of CAD, MI, b/l PE in Feb 2020 w/ recurrent PE Apr 2021 (discharged on Eliquis), SC trait w/o crisis (on 1L NC at home), now presenting with a 1 week history of shortness of breath, chest pain and racing heart found to have acute PE while taking Eliquis. This will be his 3rd admission for acute PE since 2/2020. Hematology consulted for further recs.     #B/L Pulmonary Embolism   - patient initially consulted on by hematology in Feb 2020 for leukocytosis and had a PE at that time   - extensive work up for malignancy as had some hilar lymphadenopathy and abnormalities on peripheral blood smear but work up was unrevealing   - Has history of HgBSC disease   - Followed at [facility] with Dr. Hinkle and lost to follow up until second unprovoked PE 4/30/21 when admitted to [facility]. It is unclear how long he stayed on eliquis after his first PE  - Send SANAZ ,Rheumatoid factor, ESR  - Supposedly thrombophilia evaluation done 2/2020 including anticardiolipin antibodies, anti-beta1 glycoprotein 2 antibodies, factor V Leiden mut, prothrombin gene mut, however do not see these lab results. Please send.  - Hold on ATIII and Protein C and S as acute clot and on heparin which the results will not be accurate.   - c/w heparin drip for now or can transition to lovenox. Will require lifelong anticoagulation. 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 F/U TTE this admission.   - Troponin and BNP elevated this admission likely due to right heart strain from clot burden.     #Leukocytosis   - patient has had leukocytosis dating back to 2019 and likely prior  - persistent monocytosis as well as presence smudge cells and NRBC so BMBX done as seen below  - Peripheral Flow Cytometry 2/13/20: The myeloid immunophenotypic findings show no increase in myeloid immaturity and slightly   increased monocytes. The lymphocyte immunophenotypic findings show no diagnostic abnormalities with  presence of CD5 polytypic B-cells, which may be seen in autoimmune conditions.   - Bone Marrow Flow Cytometry 2/13/20: - The immunophenotypic findings show no significant diagnostic abnormalities with small subset   of polytypic B-cells showing CD5-positivity, which may be seen in autoimmune conditions.   - BMBX path 2/13/20: - Erythroid-predominant trilineage hematopoiesis with   maturation, mild polytypic plasmacytosis and minute population of   CD5-positive polytypic B-cells.   - no further work up inpatient other than rheumatology work up requested above. The rest can be done outpatient at [facility] with Dr. Hinkle.     Armani Baugh MD   Hematology/Oncology Fellow   Pager [phone]   Covering Fellow for July 4  51yoM with PMH of CAD, MI, b/l PE in Feb 2020 w/ recurrent PE Apr 2021 (discharged on Eliquis), SC trait w/o crisis (on 1L NC at home), now presenting with a 1 week history of shortness of breath, chest pain and racing heart found to have acute PE while taking Eliquis. This will be his 3rd admission for acute PE since 2/2020. Hematology consulted for further recs.     #B/L Pulmonary Embolism   - patient initially consulted on by hematology in Feb 2020 for leukocytosis and had a PE at that time   - extensive work up for malignancy as had some hilar lymphadenopathy and abnormalities on peripheral blood smear but work up was unrevealing   - Has history of HgBSC disease   - [place] [facility] [place] and lost to follow up until second unprovoked PE 4/30/21 when admitted to [facility]. It is unclear how long he stayed on eliquis after his first PE  - Send SANAZ ,Rheumatoid factor, ESR  - Supposedly thrombophilia evaluation done 2/2020 including anticardiolipin antibodies, anti-beta1 glycoprotein 2 antibodies, factor V Leiden mut, prothrombin gene mut, however do not see these lab results. Please send.  - Hold on ATIII and Protein C and S as acute clot and on heparin which the results will not be accurate.   - c/w heparin drip for now or can transition to lovenox. Will require lifelong anticoagulation. 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 F/U TTE this admission.   - Troponin and BNP elevated this admission likely due to right heart strain from clot burden.   - Appreciate ICU evaluation. Low threshold to reconsult if clinically deteriorates.     #Leukocytosis   - patient has had leukocytosis dating back to 2019 and likely prior  - persistent monocytosis as well as presence smudge cells and NRBC so BMBX done as seen below  - Peripheral Flow Cytometry 2/13/20: The myeloid immunophenotypic findings show no increase in myeloid immaturity and slightly   increased monocytes. The lymphocyte immunophenotypic findings show no diagnostic abnormalities with  presence of CD5 polytypic B-cells, which may be seen in autoimmune conditions.   - Bone Marrow Flow Cytometry 2/13/20: - The immunophenotypic findings show no significant diagnostic abnormalities with small subset   of polytypic B-cells showing CD5-positivity, which may be seen in autoimmune conditions.   - BMBX path 2/13/20: - Erythroid-predominant trilineage hematopoiesis with   maturation, mild polytypic plasmacytosis and minute population of   CD5-positive polytypic B-cells.   - no further work up inpatient other than rheumatology work up requested above. The rest can be done outpatient [place] [facility] [place]inkle.     Armani Baugh MD   Hematology/Oncology Fellow   Pager [phone]   Covering Fellow for July 4  51yoM with PMH of CAD, MI, b/l PE in Feb 2020 w/ recurrent PE Apr 2021 (discharged on Eliquis), SC trait w/o crisis (on 1L NC at home), now presenting with a 1 week history of shortness of breath, chest pain and racing heart found to have acute PE while taking Eliquis. This will be his 3rd admission for acute PE since 2/2020. Hematology consulted for further recs.     #B/L Pulmonary Embolism   - patient initially consulted on by hematology in Feb 2020 for leukocytosis and had a PE at that time   - extensive work up for malignancy as had some hilar lymphadenopathy and abnormalities on peripheral blood smear but work up was unrevealing   - Has history of HgBSC disease   - Followed at [facility] with Dr. Hinkle and lost to follow up until second unprovoked PE 4/30/21 when admitted to [facility]. It is unclear how long he stayed on eliquis after his first PE  - Send SANAZ ,Rheumatoid factor, ESR  - Supposedly thrombophilia evaluation done 2/2020 including anticardiolipin antibodies, anti-beta1 glycoprotein 2 antibodies, factor V Leiden mut, prothrombin gene mut, however do not see these lab results. Please send.  - Hold on ATIII and Protein C and S as acute clot and on heparin which the results will not be accurate.   - c/w heparin drip for now or can transition to lovenox. Will require lifelong anticoagulation. 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 F/U TTE this admission.   - Troponin and BNP elevated this admission likely due to right heart strain from clot burden.   - Appreciate ICU evaluation. Low threshold to reconsult if clinically deteriorates.     #Leukocytosis   - although has an element of reactive leukocytosis with acute PE has history of leukocytosis dating back to 2019 and likely prior  - persistent monocytosis as well as presence smudge cells and NRBC so BMBX done as seen below  - Peripheral Flow Cytometry 2/13/20: The myeloid immunophenotypic findings show no increase in myeloid immaturity and slightly   increased monocytes. The lymphocyte immunophenotypic findings show no diagnostic abnormalities with  presence of CD5 polytypic B-cells, which may be seen in autoimmune conditions.   - Bone Marrow Flow Cytometry 2/13/20: - The immunophenotypic findings show no significant diagnostic abnormalities with small subset   of polytypic B-cells showing CD5-positivity, which may be seen in autoimmune conditions.   - BMBX path 2/13/20: - Erythroid-predominant trilineage hematopoiesis with   maturation, mild polytypic plasmacytosis and minute population of   CD5-positive polytypic B-cells.   - no further work up inpatient other than rheumatology work up requested above. The rest can be done outpatient at [facility] with Dr. Hinkle.     Armani Baugh MD   Hematology/Oncology Fellow   Pager [phone]   Covering Fellow for July 4  51yoM with PMH of CAD, MI, b/l PE in Feb 2020 w/ recurrent PE Apr 2021 (discharged on Eliquis), SC trait w/o crisis (on 1L NC at home), now presenting with a 1 week history of shortness of breath, chest pain and racing heart found to have acute PE while taking Eliquis. This will be his 3rd admission for acute PE since 2/2020. Hematology consulted for further recs.     #B/L Pulmonary Embolism   - patient initially consulted on by hematology in Feb 2020 for leukocytosis and had a PE at that time   - extensive work up for malignancy as had some hilar lymphadenopathy and abnormalities on peripheral blood smear but work up was unrevealing   - Has history of HgBSC disease   - Followed at [facility] with Dr. Hinkle and lost to follow up until second unprovoked PE 4/30/21 when admitted to [facility]. It is unclear how long he stayed on eliquis after his first PE  - Send SANAZ ,Rheumatoid factor, ESR  - Supposedly thrombophilia evaluation done 2/2020 including anticardiolipin antibodies, anti-beta1 glycoprotein 2 antibodies, factor V Leiden mut, prothrombin gene mut, however do not see these lab results. Please send.  - Hold on ATIII and Protein C and S as acute clot and on heparin which the results will not be accurate.   - c/w heparin drip for now or can transition to lovenox. Will require lifelong anticoagulation. 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 F/U TTE this admission.   - Troponin and BNP elevated this admission likely due to right heart strain from clot burden.   - Appreciate ICU evaluation. Low threshold to reconsult if clinically deteriorates.     #Normocytic Anemia  - baseline hemoglobin ~12   - history of SC disease but no h/o crisis   - now with hemoglobin 10.8   - increasing nRBCs and Tbili 3.4 (downtrending)   - likely related to underlying SC disease with no acute b/l PE   - send iron studies, ferritin, B12/folate   - send hemoglobin electrophoresis   - fractionate bilirubin  - send retic, LDH, haptoglobin    - monitor CBC with diff daily   - monitor hemolysis parameters daily   - O2, pain control, IVF (cautious with RV dysfunction), incentive spirometer   - can give folic acid 1mg PO daily     #Leukocytosis   - although has an element of reactive leukocytosis with acute PE has history of leukocytosis dating back to 2019 and likely prior  - persistent monocytosis as well as presence smudge cells and NRBC so BMBX done as seen below  - Peripheral Flow Cytometry 2/13/20: The myeloid immunophenotypic findings show no increase in myeloid immaturity and slightly   increased monocytes. The lymphocyte immunophenotypic findings show no diagnostic abnormalities with  presence of CD5 polytypic B-cells, which may be seen in autoimmune conditions.   - Bone Marrow Flow Cytometry 2/13/20: - The immunophenotypic findings show no significant diagnostic abnormalities with small subset   of polytypic B-cells showing CD5-positivity, which may be seen in autoimmune conditions.   - BMBX path 2/13/20: - Erythroid-predominant trilineage hematopoiesis with   maturation, mild polytypic plasmacytosis and minute population of   CD5-positive polytypic B-cells.   - no further work up inpatient other than rheumatology work up requested above. The rest can be done outpatient at [facility] with Dr. Hinkle.     Armani Baugh MD   Hematology/Oncology Fellow   Pager [phone]   Covering Fellow for July 4  51yoM with PMH of CAD, MI, b/l PE in Feb 2020 w/ recurrent PE Apr 2021 (discharged on Eliquis), SC trait w/o crisis (on 1L NC at home), now presenting with a 1 week history of shortness of breath, chest pain and racing heart found to have acute PE while taking Eliquis. This will be his 3rd admission for acute PE since 2/2020. Hematology consulted for further recs.     #B/L Pulmonary Embolism   - patient initially consulted on by hematology in Feb 2020 for leukocytosis and had a PE at that time   - extensive work up for malignancy as had some hilar lymphadenopathy and abnormalities on peripheral blood smear but work up was unrevealing   - Has history of HgBSC disease   - Followed at [facility] with Dr. Hinkle and lost to follow up until second unprovoked PE 4/30/21 when admitted to [facility]. It is unclear how long he stayed on eliquis after his first PE  - Send SANAZ ,Rheumatoid factor, ESR  - Supposedly thrombophilia evaluation done 2/2020 including anticardiolipin antibodies, anti-beta1 glycoprotein 2 antibodies, factor V Leiden mut, prothrombin gene mut, however do not see these lab results. Please send.  - Hold on ATIII and Protein C and S as acute clot and on heparin which the results will not be accurate.   - c/w heparin drip for now or can transition to lovenox. Will require lifelong anticoagulation. 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 F/U TTE this admission.   - Troponin and BNP elevated this admission likely due to right heart strain from clot burden.   - Appreciate ICU evaluation. Low threshold to reconsult if clinically deteriorates.     #Normocytic Anemia  - baseline hemoglobin ~12   - history of SC disease but no h/o crisis   - now with hemoglobin 10.8   - increasing nRBCs and Tbili 3.4 (downtrending)   - likely related to underlying SC disease with no acute b/l PE   - send iron studies, ferritin, B12/folate   - send hemoglobin electrophoresis   - fractionate bilirubin  - send retic, LDH, haptoglobin, direct hugo     - monitor CBC with diff daily   - monitor hemolysis parameters daily   - O2, pain control, IVF (cautious with RV dysfunction), incentive spirometer   - can give folic acid 1mg PO daily     #Leukocytosis   - although has an element of reactive leukocytosis with acute PE has history of leukocytosis dating back to 2019 and likely prior  - persistent monocytosis as well as presence smudge cells and NRBC so BMBX done as seen below  - Peripheral Flow Cytometry 2/13/20: The myeloid immunophenotypic findings show no increase in myeloid immaturity and slightly   increased monocytes. The lymphocyte immunophenotypic findings show no diagnostic abnormalities with  presence of CD5 polytypic B-cells, which may be seen in autoimmune conditions.   - Bone Marrow Flow Cytometry 2/13/20: - The immunophenotypic findings show no significant diagnostic abnormalities with small subset   of polytypic B-cells showing CD5-positivity, which may be seen in autoimmune conditions.   - BMBX path 2/13/20: - Erythroid-predominant trilineage hematopoiesis with   maturation, mild polytypic plasmacytosis and minute population of   CD5-positive polytypic B-cells.   - no further work up inpatient other than rheumatology work up requested above. The rest can be done outpatient at [facility] with Dr. Hinkle.     Armani Baugh MD   Hematology/Oncology Fellow   Pager [phone]   Covering Fellow for July 4

## 2021-07-04 NOTE — CONSULT NOTE ADULT - ATTENDING COMMENTS
Recurrent submassive PE on apixaban. sPESI score 0 at this time-1.1% risk of 30-day mortality. May be considered for early ambulation and outpatient management, if no change in clinical status.    Plan:  1. Pt failed AC w/ apixaban-would recommend switching to a different class of DOAC like Dabigatran 150 mg BID.  2. Telemetry while admitted, frequent clinical and SpO2 checks. TTE in AM-plz call if clinical decompensation.  3. B/L venous duplex to exclude acute DVT.    Thanks for the opportunity of participating in the care of this pt. Please call [phone] for further questions/issues.     Vasquez Escalona MD   Interventional cardiologist   Coverage information @ [email or web address] ,  pw:  [email or web address]
Patient with recurrent PE in the setting of SC disease, on HFNC. Appears and notes feeling comfortable now. Workup per above, continue heparin gtt.

## 2021-07-05 LAB
ANION GAP SERPL CALC-SCNC: 13 MMOL/L — SIGNIFICANT CHANGE UP (ref 7–14)
APTT BLD: 58.3 SEC — HIGH (ref 27–36.3)
BILIRUB DIRECT SERPL-MCNC: 0.4 MG/DL — HIGH (ref 0–0.2)
BILIRUB INDIRECT FLD-MCNC: 1 MG/DL — SIGNIFICANT CHANGE UP (ref 0–1)
BILIRUB SERPL-MCNC: 1.4 MG/DL — HIGH (ref 0.2–1.2)
BUN SERPL-MCNC: 11 MG/DL — SIGNIFICANT CHANGE UP (ref 7–23)
CALCIUM SERPL-MCNC: 8.8 MG/DL — SIGNIFICANT CHANGE UP (ref 8.4–10.5)
CHLORIDE SERPL-SCNC: 107 MMOL/L — SIGNIFICANT CHANGE UP (ref 98–107)
CO2 SERPL-SCNC: 19 MMOL/L — LOW (ref 22–31)
CREAT SERPL-MCNC: 0.98 MG/DL — SIGNIFICANT CHANGE UP (ref 0.5–1.3)
ERYTHROCYTE [SEDIMENTATION RATE] IN BLOOD: 7 MM/HR — SIGNIFICANT CHANGE UP (ref 1–15)
FERRITIN SERPL-MCNC: 98 NG/ML — SIGNIFICANT CHANGE UP (ref 30–400)
FOLATE SERPL-MCNC: 13.7 NG/ML — SIGNIFICANT CHANGE UP (ref 3.1–17.5)
GLUCOSE SERPL-MCNC: 103 MG/DL — HIGH (ref 70–99)
HAPTOGLOB SERPL-MCNC: 87 MG/DL — SIGNIFICANT CHANGE UP (ref 34–200)
HCT VFR BLD CALC: 29 % — LOW (ref 39–50)
HGB BLD-MCNC: 10.5 G/DL — LOW (ref 13–17)
INR BLD: 1.28 RATIO — HIGH (ref 0.88–1.16)
IRON SATN MFR SERPL: 14 % — SIGNIFICANT CHANGE UP (ref 14–50)
IRON SATN MFR SERPL: 43 UG/DL — LOW (ref 45–165)
LDH SERPL L TO P-CCNC: 481 U/L — HIGH (ref 135–225)
MAGNESIUM SERPL-MCNC: 2.2 MG/DL — SIGNIFICANT CHANGE UP (ref 1.6–2.6)
MCHC RBC-ENTMCNC: 32.1 PG — SIGNIFICANT CHANGE UP (ref 27–34)
MCHC RBC-ENTMCNC: 36.2 GM/DL — HIGH (ref 32–36)
MCV RBC AUTO: 88.7 FL — SIGNIFICANT CHANGE UP (ref 80–100)
NRBC # BLD: 24 /100 WBCS — SIGNIFICANT CHANGE UP
NRBC # FLD: 4.2 K/UL — HIGH
PHOSPHATE SERPL-MCNC: 3.3 MG/DL — SIGNIFICANT CHANGE UP (ref 2.5–4.5)
PLATELET # BLD AUTO: 291 K/UL — SIGNIFICANT CHANGE UP (ref 150–400)
POTASSIUM SERPL-MCNC: 3.8 MMOL/L — SIGNIFICANT CHANGE UP (ref 3.5–5.3)
POTASSIUM SERPL-SCNC: 3.8 MMOL/L — SIGNIFICANT CHANGE UP (ref 3.5–5.3)
PROTHROM AB SERPL-ACNC: 14.4 SEC — HIGH (ref 10.6–13.6)
RBC # BLD: 3.27 M/UL — LOW (ref 4.2–5.8)
RBC # BLD: 3.27 M/UL — LOW (ref 4.2–5.8)
RBC # FLD: 23 % — HIGH (ref 10.3–14.5)
RETICS #: 262.3 K/UL — HIGH (ref 25–125)
RETICS/RBC NFR: 8 % — HIGH (ref 0.5–2.5)
SODIUM SERPL-SCNC: 139 MMOL/L — SIGNIFICANT CHANGE UP (ref 135–145)
TIBC SERPL-MCNC: 309 UG/DL — SIGNIFICANT CHANGE UP (ref 220–430)
UIBC SERPL-MCNC: 266 UG/DL — SIGNIFICANT CHANGE UP (ref 110–370)
VIT B12 SERPL-MCNC: 337 PG/ML — SIGNIFICANT CHANGE UP (ref 200–900)
WBC # BLD: 17.4 K/UL — HIGH (ref 3.8–10.5)
WBC # FLD AUTO: 17.4 K/UL — HIGH (ref 3.8–10.5)

## 2021-07-05 PROCEDURE — G0452: CPT | Mod: 26

## 2021-07-05 PROCEDURE — 93970 EXTREMITY STUDY: CPT | Mod: 26

## 2021-07-05 PROCEDURE — 99233 SBSQ HOSP IP/OBS HIGH 50: CPT

## 2021-07-05 RX ORDER — WARFARIN SODIUM 2.5 MG/1
5 TABLET ORAL ONCE
Refills: 0 | Status: COMPLETED | OUTPATIENT
Start: 2021-07-05 | End: 2021-07-05

## 2021-07-05 RX ORDER — KETOROLAC TROMETHAMINE 30 MG/ML
15 SYRINGE (ML) INJECTION ONCE
Refills: 0 | Status: DISCONTINUED | OUTPATIENT
Start: 2021-07-05 | End: 2021-07-05

## 2021-07-05 RX ADMIN — HEPARIN SODIUM 1700 UNIT(S)/HR: 5000 INJECTION INTRAVENOUS; SUBCUTANEOUS at 08:21

## 2021-07-05 RX ADMIN — Medication 1 MILLIGRAM(S): at 13:04

## 2021-07-05 RX ADMIN — Medication 650 MILLIGRAM(S): at 17:44

## 2021-07-05 RX ADMIN — Medication 15 MILLIGRAM(S): at 19:04

## 2021-07-05 RX ADMIN — Medication 650 MILLIGRAM(S): at 18:44

## 2021-07-05 RX ADMIN — WARFARIN SODIUM 5 MILLIGRAM(S): 2.5 TABLET ORAL at 17:44

## 2021-07-05 RX ADMIN — Medication 15 MILLIGRAM(S): at 19:15

## 2021-07-05 NOTE — PROGRESS NOTE ADULT - PROBLEM SELECTOR PLAN 1
Patient with bilateral recurring PE, in the setting of recently running out of home Eliquis. C/b hypoxemia, elevated trops. Also with elevated [id number], but without volume overload on exam.  - CT Angio: shows bilateral segmental/subsegmental PE, pulmonary HTN w/ dilated pulmonary artery and RV enlargement (?CTEPH). Also, new PAIGE 0.4cm nodule.  - apprec MICU recs  -apprec hematology consult recs, will need lifetime anticoagulation, favor coumadin over doac, folic acid, labs ordered   - TTE 7/4 LVEF 55%, RV strain with dilated RV/RV pressure overload/decreased RV fxn, mod PAH  - c/w Heparin drip, monitor aPTT q6h, adjust dose by nomogram  -daily PT/INR, start coumadin , 5 mg tonight  - monitor on telemetry with continuous pulse ox  - F/u cardiology , expedite leg doppler Patient with bilateral recurring PE, in the setting of recently running out of home Eliquis. C/b hypoxemia, elevated trops. Also with elevated [id number], but without volume overload on exam.  - CT Angio: shows bilateral segmental/subsegmental PE, pulmonary HTN w/ dilated pulmonary artery and RV enlargement (?CTEPH). Also, new PAIGE 0.4cm nodule.  - apprec MICU recs  -apprec hematology consult recs, will need lifetime anticoagulation, favor coumadin over doac, folic acid, labs ordered   - TTE 7/4 LVEF 55%, RV strain with dilated RV/RV pressure overload/decreased RV fxn, mod PAH  - c/w Heparin drip, monitor aPTT q6h, adjust dose by nomogram  -daily PT/INR, start coumadin , 5 mg tonight, aim INR 2-3  - monitor on telemetry with continuous pulse ox  - F/u cardiology , expedite leg doppler

## 2021-07-05 NOTE — PROGRESS NOTE ADULT - SUBJECTIVE AND OBJECTIVE BOX
Dr. Tory Yang  Pager [phone]    PROGRESS NOTE:     Patient is a 51y old  Male who presents with a chief complaint of Pulmonary Embolism (04 Jul 2021 11:18)      SUBJECTIVE / OVERNIGHT EVENTS: pt denies chest pain/sob, comfortable on high flow  ADDITIONAL REVIEW OF SYSTEMS: afebrile     MEDICATIONS  (STANDING):  folic acid 1 milliGRAM(s) Oral daily  heparin  Infusion. 1500 Unit(s)/Hr (15 mL/Hr) IV Continuous <Continuous>  warfarin 5 milliGRAM(s) Oral once    MEDICATIONS  (PRN):  acetaminophen   Tablet .. 650 milliGRAM(s) Oral every 6 hours PRN Temp greater or equal to 38C (100.4F), Mild Pain (1 - 3), Moderate Pain (4 - 6)  heparin   Injectable 6500 Unit(s) IV Push every 6 hours PRN For aPTT less than 40  heparin   Injectable 3000 Unit(s) IV Push every 6 hours PRN For aPTT between 40 - 57      CAPILLARY BLOOD GLUCOSE        I&O's Summary      PHYSICAL EXAM:  Vital Signs Last 24 Hrs  T(C): 36.5 (05 Jul 2021 05:30), Max: 36.5 (05 Jul 2021 05:30)  T(F): 97.7 (05 Jul 2021 05:30), Max: 97.7 (05 Jul 2021 05:30)  HR: 83 (05 Jul 2021 11:37) (83 - 104)  BP: 113/72 (05 Jul 2021 05:30) (113/72 - 113/72)  BP(mean): --  RR: 18 (05 Jul 2021 11:37) (10 - 22)  SpO2: 98% (05 Jul 2021 11:37) (96% - 98%)  CONSTITUTIONAL: NAD, well-developed, on high flow  RESPIRATORY: tachypneic;; lungs are clear to auscultation bilaterally  CARDIOVASCULAR: Regular rate and rhythm, normal S1 and S2, no murmur/rub/gallop; No lower extremity edema; Peripheral pulses are 2+ bilaterally  ABDOMEN: Nontender to palpation, normoactive bowel sounds, no rebound/guarding; No hepatosplenomegaly  MUSCULOSKELETAL: no clubbing or cyanosis of digits; no joint swelling or tenderness to palpation  PSYCH: A+O to person, place, and time; affect appropriate      LABS:                        10.5   17.40 )-----------( 291      ( 05 Jul 2021 06:52 )             29.0     07-05    139  |  107  |  11  ----------------------------<  103<H>  3.8   |  19<L>  |  0.98    Ca    8.8      05 Jul 2021 06:52  Phos  3.3     07-05  Mg     2.20     07-05    TPro  x   /  Alb  x   /  TBili  1.4<H>  /  DBili  0.4<H>  /  AST  x   /  ALT  x   /  AlkPhos  x   07-05    PTT - ( 05 Jul 2021 06:52 )  PTT:58.3 sec      RADIOLOGY & ADDITIONAL TESTS:  Results Reviewed:   Imaging Personally Reviewed:  < from: Transthoracic Echocardiogram (07.04.21 @ 13:38) >    ------------------------------------------------------------------------  CONCLUSIONS:  LVEF 55%  1. Normal mitral valve. Mild mitral regurgitation.  2. Normal left ventricular internal dimensions and wall  thicknesses.  3. Endocardium not well visualized; grossly normal left  ventricular systolic function. Flattening of the  interventricular septum in both systole and diastole is  consistent with right ventricular pressure overload.  4. Mild diastolic dysfunction (Stage I).  5. Right atrial enlargement.  6. Right ventricular enlargement with decreased right  ventricular systolic function.  7. Normal tricuspid valve. Moderate tricuspid  regurgitation.  8. Estimated pulmonary artery systolic pressure equals 60  mm Hg, assuming right atrial pressure equals 10  mm Hg,  consistent with moderate pulmonary hypertension.  *** Compared with echocardiogram of 5/2/2021, moderate  pulmonary hypertension is noted in today's study.  ---------------------------------------------------------------------      Electrocardiogram Personally Reviewed:    COORDINATION OF CARE:  Care Discussed with Consultants/Other Providers [Y/N]:  Prior or Outpatient Records Reviewed [Y/N]:   Dr. Tory Yang  Pager [phone]    PROGRESS NOTE:     Patient is a 51y old  Male who presents with a chief complaint of Pulmonary Embolism (04 Jul 2021 11:18)      SUBJECTIVE / OVERNIGHT EVENTS: pt denies chest pain/sob, comfortable on high flow  ADDITIONAL REVIEW OF SYSTEMS: afebrile     MEDICATIONS  (STANDING):  folic acid 1 milliGRAM(s) Oral daily  heparin  Infusion. 1500 Unit(s)/Hr (15 mL/Hr) IV Continuous <Continuous>  warfarin 5 milliGRAM(s) Oral once    MEDICATIONS  (PRN):  acetaminophen   Tablet .. 650 milliGRAM(s) Oral every 6 hours PRN Temp greater or equal to 38C (100.4F), Mild Pain (1 - 3), Moderate Pain (4 - 6)  heparin   Injectable 6500 Unit(s) IV Push every 6 hours PRN For aPTT less than 40  heparin   Injectable 3000 Unit(s) IV Push every 6 hours PRN For aPTT between 40 - 57      CAPILLARY BLOOD GLUCOSE        I&O's Summary      PHYSICAL EXAM:  Vital Signs Last 24 Hrs  T(C): 36.5 (05 Jul 2021 05:30), Max: 36.5 (05 Jul 2021 05:30)  T(F): 97.7 (05 Jul 2021 05:30), Max: 97.7 (05 Jul 2021 05:30)  HR: 83 (05 Jul 2021 11:37) (83 - 104)  BP: 113/72 (05 Jul 2021 05:30) (113/72 - 113/72)  BP(mean): --  RR: 18 (05 Jul 2021 11:37) (10 - 22)  SpO2: 98% (05 Jul 2021 11:37) (96% - 98%)  CONSTITUTIONAL: NAD, well-developed, on high flow  RESPIRATORY: tachypneic;; lungs are clear to auscultation bilaterally  CARDIOVASCULAR: Regular rate and rhythm, normal S1 and S2, no murmur/rub/gallop; No lower extremity edema; Peripheral pulses are 2+ bilaterally  ABDOMEN: Nontender to palpation, normoactive bowel sounds, no rebound/guarding; No hepatosplenomegaly  MUSCULOSKELETAL: no clubbing or cyanosis of digits; no joint swelling or tenderness to palpation  PSYCH: A+O to person, place, and time; affect appropriate      LABS:                        10.5   17.40 )-----------( 291      ( 05 Jul 2021 06:52 )             29.0     07-05    139  |  107  |  11  ----------------------------<  103<H>  3.8   |  19<L>  |  0.98    Ca    8.8      05 Jul 2021 06:52  Phos  3.3     07-05  Mg     2.20     07-05    TPro  x   /  Alb  x   /  TBili  1.4<H>  /  DBili  0.4<H>  /  AST  x   /  ALT  x   /  AlkPhos  x   07-05    PTT - ( 05 Jul 2021 06:52 )  PTT:58.3 sec      RADIOLOGY & ADDITIONAL TESTS:  Results Reviewed:   Imaging Personally Reviewed:  < from: Transthoracic Echocardiogram (07.04.21 @ 13:38) >    ------------------------------------------------------------------------  CONCLUSIONS:  LVEF 55%  1. Normal mitral valve. Mild mitral regurgitation.  2. Normal left ventricular internal dimensions and wall  thicknesses.  3. Endocardium not well visualized; grossly normal left  ventricular systolic function. Flattening of the  interventricular septum in both systole and diastole is  consistent with right ventricular pressure overload.  4. Mild diastolic dysfunction (Stage I).  5. Right atrial enlargement.  6. Right ventricular enlargement with decreased right  ventricular systolic function.  7. Normal tricuspid valve. Moderate tricuspid  regurgitation.  8. Estimated pulmonary artery systolic pressure equals 60  mm Hg, assuming right atrial pressure equals 10  mm Hg,  consistent with moderate pulmonary hypertension.  *** Compared with echocardiogram of 5/2/2021, moderate  pulmonary hypertension is noted in today's study.  ---------------------------------------------------------------------      Electrocardiogram Personally Reviewed:    COORDINATION OF CARE:  Care Discussed with Consultants/Other Providers [Y/N]: kirstie Zhou, daily PT/INR, dose coumadin  Prior or Outpatient Records Reviewed [Y/N]:

## 2021-07-06 LAB
ANION GAP SERPL CALC-SCNC: 13 MMOL/L — SIGNIFICANT CHANGE UP (ref 7–14)
APTT BLD: 65 SEC — HIGH (ref 27–36.3)
B2 GLYCOPROT1 AB SER QL: NEGATIVE — SIGNIFICANT CHANGE UP
BUN SERPL-MCNC: 12 MG/DL — SIGNIFICANT CHANGE UP (ref 7–23)
CALCIUM SERPL-MCNC: 9.3 MG/DL — SIGNIFICANT CHANGE UP (ref 8.4–10.5)
CHLORIDE SERPL-SCNC: 108 MMOL/L — HIGH (ref 98–107)
CO2 SERPL-SCNC: 20 MMOL/L — LOW (ref 22–31)
CREAT SERPL-MCNC: 1.05 MG/DL — SIGNIFICANT CHANGE UP (ref 0.5–1.3)
GLUCOSE SERPL-MCNC: 104 MG/DL — HIGH (ref 70–99)
HCT VFR BLD CALC: 30.7 % — LOW (ref 39–50)
HEMOGLOBIN INTERPRETATION: SIGNIFICANT CHANGE UP
HGB A MFR BLD: 0 % — LOW
HGB A2 MFR BLD: 3.6 % — HIGH (ref 2.4–3.5)
HGB BLD-MCNC: 11 G/DL — LOW (ref 13–17)
HGB C MFR BLD: 47.4 % — HIGH
HGB F MFR BLD: <1 % — SIGNIFICANT CHANGE UP (ref 0–1.5)
HGB S MFR BLD: 48.5 % — HIGH
INR BLD: 1.2 RATIO — HIGH (ref 0.88–1.16)
MAGNESIUM SERPL-MCNC: 2.2 MG/DL — SIGNIFICANT CHANGE UP (ref 1.6–2.6)
MCHC RBC-ENTMCNC: 31.8 PG — SIGNIFICANT CHANGE UP (ref 27–34)
MCHC RBC-ENTMCNC: 35.8 GM/DL — SIGNIFICANT CHANGE UP (ref 32–36)
MCV RBC AUTO: 88.7 FL — SIGNIFICANT CHANGE UP (ref 80–100)
NRBC # BLD: 19 /100 WBCS — SIGNIFICANT CHANGE UP
NRBC # FLD: 2.97 K/UL — HIGH
PHOSPHATE SERPL-MCNC: 4.3 MG/DL — SIGNIFICANT CHANGE UP (ref 2.5–4.5)
PLATELET # BLD AUTO: 347 K/UL — SIGNIFICANT CHANGE UP (ref 150–400)
POTASSIUM SERPL-MCNC: 4 MMOL/L — SIGNIFICANT CHANGE UP (ref 3.5–5.3)
POTASSIUM SERPL-SCNC: 4 MMOL/L — SIGNIFICANT CHANGE UP (ref 3.5–5.3)
PROTHROM AB SERPL-ACNC: 13.6 SEC — SIGNIFICANT CHANGE UP (ref 10.6–13.6)
RBC # BLD: 3.46 M/UL — LOW (ref 4.2–5.8)
RBC # FLD: 22.3 % — HIGH (ref 10.3–14.5)
SODIUM SERPL-SCNC: 141 MMOL/L — SIGNIFICANT CHANGE UP (ref 135–145)
WBC # BLD: 15.66 K/UL — HIGH (ref 3.8–10.5)
WBC # FLD AUTO: 15.66 K/UL — HIGH (ref 3.8–10.5)

## 2021-07-06 PROCEDURE — 99233 SBSQ HOSP IP/OBS HIGH 50: CPT

## 2021-07-06 RX ORDER — WARFARIN SODIUM 2.5 MG/1
5 TABLET ORAL ONCE
Refills: 0 | Status: COMPLETED | OUTPATIENT
Start: 2021-07-06 | End: 2021-07-06

## 2021-07-06 RX ORDER — APIXABAN 2.5 MG/1
1 TABLET, FILM COATED ORAL
Qty: 60 | Refills: 0
Start: 2021-07-06 | End: 2021-08-04

## 2021-07-06 RX ORDER — GABAPENTIN 400 MG/1
100 CAPSULE ORAL THREE TIMES A DAY
Refills: 0 | Status: DISCONTINUED | OUTPATIENT
Start: 2021-07-06 | End: 2021-07-08

## 2021-07-06 RX ADMIN — Medication 650 MILLIGRAM(S): at 22:25

## 2021-07-06 RX ADMIN — WARFARIN SODIUM 5 MILLIGRAM(S): 2.5 TABLET ORAL at 17:27

## 2021-07-06 RX ADMIN — Medication 650 MILLIGRAM(S): at 12:09

## 2021-07-06 RX ADMIN — GABAPENTIN 100 MILLIGRAM(S): 400 CAPSULE ORAL at 21:29

## 2021-07-06 RX ADMIN — HEPARIN SODIUM 1700 UNIT(S)/HR: 5000 INJECTION INTRAVENOUS; SUBCUTANEOUS at 07:43

## 2021-07-06 RX ADMIN — Medication 650 MILLIGRAM(S): at 13:09

## 2021-07-06 RX ADMIN — GABAPENTIN 100 MILLIGRAM(S): 400 CAPSULE ORAL at 12:18

## 2021-07-06 RX ADMIN — Medication 650 MILLIGRAM(S): at 21:29

## 2021-07-06 RX ADMIN — Medication 1 MILLIGRAM(S): at 10:57

## 2021-07-06 NOTE — PROGRESS NOTE ADULT - PROBLEM SELECTOR PLAN 1
? acute on chronic PE with Right heart strain,  in the setting of recently running out of home Eliquis. C/b hypoxemia, elevated trops. Also with elevated [id number], but without volume overload on exam. s/p MICU eval  - CT Angio: shows bilateral segmental/subsegmental PE, pulmonary HTN w/ dilated pulmonary artery and RV enlargement (?CTEPH). Also, new PAIGE 0.4cm nodule. TTE shows pulm HTN and right heart strain  -apprec hematology consult recs, will need lifetime anticoagulation, favor coumadin over doac ( unclear reason, as it may not be a feasible option in a non-compliant pt), folic acid,   - c/w Heparin drip, gtt bridge to coumadin pending Heme recs  - monitor on telemetry with continuous pulse ox. dopplers neg

## 2021-07-06 NOTE — CHART NOTE - NSCHARTNOTEFT_GEN_A_CORE
APLS labs and other hypercoagulable work-ups are still pending. In this situation, warfarin is the most desired and safe medication for anticoagulation however if there is compliant issue with the patient, we can start Xarelto starter pack and close f/u as outpatient. Technically he did not have apixaban failure as he was not compliant with it. [place] [facility] [place]    Jennie Caldwell MD  PGY 6, Oncology/Hematology fellow  (P) [phone]  After 5pm, please contact on-call team.

## 2021-07-06 NOTE — PROGRESS NOTE ADULT - SUBJECTIVE AND OBJECTIVE BOX
[facility] Division of Hospital Medicine  Otoniel Diamond MD  Pager [phone]      Patient is a 51y old  Male who presents with a chief complaint of Pulmonary Embolism (05 Jul 2021 12:07)      SUBJECTIVE / OVERNIGHT EVENTS: Improving SOB. No CP.    MEDICATIONS  (STANDING):  folic acid 1 milliGRAM(s) Oral daily  gabapentin 100 milliGRAM(s) Oral three times a day  heparin  Infusion. 1500 Unit(s)/Hr (15 mL/Hr) IV Continuous <Continuous>    MEDICATIONS  (PRN):  acetaminophen   Tablet .. 650 milliGRAM(s) Oral every 6 hours PRN Temp greater or equal to 38C (100.4F), Mild Pain (1 - 3), Moderate Pain (4 - 6)  heparin   Injectable 6500 Unit(s) IV Push every 6 hours PRN For aPTT less than 40  heparin   Injectable 3000 Unit(s) IV Push every 6 hours PRN For aPTT between 40 - 57      CAPILLARY BLOOD GLUCOSE        I&O's Summary    05 Jul 2021 07:01  -  06 Jul 2021 07:00  --------------------------------------------------------  IN: 0 mL / OUT: 400 mL / NET: -400 mL    06 Jul 2021 07:01  -  06 Jul 2021 13:27  --------------------------------------------------------  IN: 0 mL / OUT: 200 mL / NET: -200 mL        PHYSICAL EXAM:  Vital Signs Last 24 Hrs  T(C): 36.8 (06 Jul 2021 05:07), Max: 36.8 (06 Jul 2021 05:07)  T(F): 98.3 (06 Jul 2021 05:07), Max: 98.3 (06 Jul 2021 05:07)  HR: 90 (06 Jul 2021 07:52) (78 - 99)  BP: 123/80 (06 Jul 2021 05:07) (123/80 - 123/80)  BP(mean): --  RR: 18 (06 Jul 2021 05:07) (18 - 20)  SpO2: 95% (06 Jul 2021 07:52) (95% - 100%)  CONSTITUTIONAL: NAD  EYES: conjunctiva and sclera clear  ENMT: mmm  NECK: Supple,  RESPIRATORY: Normal respiratory effort; lungs are clear to auscultation bilaterally  CARDIOVASCULAR: Regular rate and rhythm, + S1 and S2  ABDOMEN: Nontender to palpation, normoactive bowel sounds, no rebound/guarding  MUSCULOSKELETAL:  no clubbing or cyanosis of digits;   PSYCH: A+O x 3  NEUROLOGY: no gross deficits   SKIN: No rashes;     LABS:                        11.0   15.66 )-----------( 347      ( 06 Jul 2021 06:33 )             30.7     07-06    141  |  108<H>  |  12  ----------------------------<  104<H>  4.0   |  20<L>  |  1.05    Ca    9.3      06 Jul 2021 06:33  Phos  4.3     07-06  Mg     2.20     07-06    TPro  x   /  Alb  x   /  TBili  1.4<H>  /  DBili  0.4<H>  /  AST  x   /  ALT  x   /  AlkPhos  x   07-05    PT/INR - ( 06 Jul 2021 06:33 )   PT: 13.6 sec;   INR: 1.20 ratio         PTT - ( 06 Jul 2021 06:33 )  PTT:65.0 sec

## 2021-07-07 LAB
ANA TITR SER: NEGATIVE — SIGNIFICANT CHANGE UP
ANION GAP SERPL CALC-SCNC: 12 MMOL/L — SIGNIFICANT CHANGE UP (ref 7–14)
APTT BLD: 78.6 SEC — HIGH (ref 27–36.3)
BASOPHILS # BLD AUTO: 0 K/UL — SIGNIFICANT CHANGE UP (ref 0–0.2)
BASOPHILS NFR BLD AUTO: 0 % — SIGNIFICANT CHANGE UP (ref 0–2)
BUN SERPL-MCNC: 14 MG/DL — SIGNIFICANT CHANGE UP (ref 7–23)
CALCIUM SERPL-MCNC: 9.1 MG/DL — SIGNIFICANT CHANGE UP (ref 8.4–10.5)
CARDIOLIPIN AB SER-ACNC: NEGATIVE — SIGNIFICANT CHANGE UP
CHLORIDE SERPL-SCNC: 106 MMOL/L — SIGNIFICANT CHANGE UP (ref 98–107)
CO2 SERPL-SCNC: 20 MMOL/L — LOW (ref 22–31)
CREAT SERPL-MCNC: 0.97 MG/DL — SIGNIFICANT CHANGE UP (ref 0.5–1.3)
DNA PLOIDY SPEC FC-IMP: SIGNIFICANT CHANGE UP
EOSINOPHIL # BLD AUTO: 0.62 K/UL — HIGH (ref 0–0.5)
EOSINOPHIL NFR BLD AUTO: 4 % — SIGNIFICANT CHANGE UP (ref 0–6)
GLUCOSE SERPL-MCNC: 93 MG/DL — SIGNIFICANT CHANGE UP (ref 70–99)
HCT VFR BLD CALC: 31.7 % — LOW (ref 39–50)
HGB BLD-MCNC: 11.6 G/DL — LOW (ref 13–17)
IANC: 10.45 K/UL — HIGH (ref 1.5–8.5)
INR BLD: 1.25 RATIO — HIGH (ref 0.88–1.16)
LYMPHOCYTES # BLD AUTO: 18 % — SIGNIFICANT CHANGE UP (ref 13–44)
LYMPHOCYTES # BLD AUTO: 2.79 K/UL — SIGNIFICANT CHANGE UP (ref 1–3.3)
MAGNESIUM SERPL-MCNC: 2.1 MG/DL — SIGNIFICANT CHANGE UP (ref 1.6–2.6)
MCHC RBC-ENTMCNC: 31.8 PG — SIGNIFICANT CHANGE UP (ref 27–34)
MCHC RBC-ENTMCNC: 36.6 GM/DL — HIGH (ref 32–36)
MCV RBC AUTO: 86.8 FL — SIGNIFICANT CHANGE UP (ref 80–100)
MONOCYTES # BLD AUTO: 0.62 K/UL — SIGNIFICANT CHANGE UP (ref 0–0.9)
MONOCYTES NFR BLD AUTO: 4 % — SIGNIFICANT CHANGE UP (ref 2–14)
NEUTROPHILS # BLD AUTO: 11.46 K/UL — HIGH (ref 1.8–7.4)
NEUTROPHILS NFR BLD AUTO: 72 % — SIGNIFICANT CHANGE UP (ref 43–77)
PHOSPHATE SERPL-MCNC: 3.9 MG/DL — SIGNIFICANT CHANGE UP (ref 2.5–4.5)
PLATELET # BLD AUTO: 431 K/UL — HIGH (ref 150–400)
POTASSIUM SERPL-MCNC: 4.1 MMOL/L — SIGNIFICANT CHANGE UP (ref 3.5–5.3)
POTASSIUM SERPL-SCNC: 4.1 MMOL/L — SIGNIFICANT CHANGE UP (ref 3.5–5.3)
PROTHROM AB SERPL-ACNC: 14.1 SEC — HIGH (ref 10.6–13.6)
PTR INTERPRETATION: SIGNIFICANT CHANGE UP
RBC # BLD: 3.65 M/UL — LOW (ref 4.2–5.8)
RBC # FLD: 21.4 % — HIGH (ref 10.3–14.5)
SODIUM SERPL-SCNC: 138 MMOL/L — SIGNIFICANT CHANGE UP (ref 135–145)
WBC # BLD: 15.49 K/UL — HIGH (ref 3.8–10.5)
WBC # FLD AUTO: 15.49 K/UL — HIGH (ref 3.8–10.5)

## 2021-07-07 PROCEDURE — 99233 SBSQ HOSP IP/OBS HIGH 50: CPT

## 2021-07-07 RX ORDER — APIXABAN 2.5 MG/1
5 TABLET, FILM COATED ORAL EVERY 12 HOURS
Refills: 0 | Status: DISCONTINUED | OUTPATIENT
Start: 2021-07-07 | End: 2021-07-08

## 2021-07-07 RX ADMIN — Medication 650 MILLIGRAM(S): at 05:22

## 2021-07-07 RX ADMIN — Medication 1 MILLIGRAM(S): at 13:01

## 2021-07-07 RX ADMIN — GABAPENTIN 100 MILLIGRAM(S): 400 CAPSULE ORAL at 13:01

## 2021-07-07 RX ADMIN — HEPARIN SODIUM 1700 UNIT(S)/HR: 5000 INJECTION INTRAVENOUS; SUBCUTANEOUS at 08:06

## 2021-07-07 RX ADMIN — APIXABAN 5 MILLIGRAM(S): 2.5 TABLET, FILM COATED ORAL at 17:00

## 2021-07-07 RX ADMIN — GABAPENTIN 100 MILLIGRAM(S): 400 CAPSULE ORAL at 21:56

## 2021-07-07 RX ADMIN — GABAPENTIN 100 MILLIGRAM(S): 400 CAPSULE ORAL at 05:22

## 2021-07-07 RX ADMIN — Medication 650 MILLIGRAM(S): at 06:22

## 2021-07-07 NOTE — PROGRESS NOTE ADULT - SUBJECTIVE AND OBJECTIVE BOX
[facility] Division of Hospital Medicine  Otoniel Diamond MD  Pager [phone]      Patient is a 51y old  Male who presents with a chief complaint of Pulmonary Embolism (06 Jul 2021 13:27)      SUBJECTIVE / OVERNIGHT EVENTS: Improved SOB    MEDICATIONS  (STANDING):  apixaban 5 milliGRAM(s) Oral every 12 hours  folic acid 1 milliGRAM(s) Oral daily  gabapentin 100 milliGRAM(s) Oral three times a day    MEDICATIONS  (PRN):  acetaminophen   Tablet .. 650 milliGRAM(s) Oral every 6 hours PRN Temp greater or equal to 38C (100.4F), Mild Pain (1 - 3), Moderate Pain (4 - 6)      CAPILLARY BLOOD GLUCOSE        I&O's Summary    06 Jul 2021 07:01  -  07 Jul 2021 07:00  --------------------------------------------------------  IN: 100 mL / OUT: 1050 mL / NET: -950 mL        PHYSICAL EXAM:  Vital Signs Last 24 Hrs  T(C): 36.7 (07 Jul 2021 05:26), Max: 37.1 (06 Jul 2021 14:27)  T(F): 98.1 (07 Jul 2021 05:26), Max: 98.8 (06 Jul 2021 14:27)  HR: 98 (07 Jul 2021 06:50) (91 - 110)  BP: 125/78 (07 Jul 2021 05:26) (125/78 - 142/86)  BP(mean): --  RR: 21 (07 Jul 2021 06:50) (18 - 24)  SpO2: 94% (07 Jul 2021 06:50) (90% - 100%)  CONSTITUTIONAL: NAD  EYES: conjunctiva and sclera clear  ENMT: mmm  NECK: Supple,  RESPIRATORY: Normal respiratory effort; lungs are clear to auscultation bilaterally  CARDIOVASCULAR: Regular rate and rhythm, + S1 and S2  ABDOMEN: Nontender to palpation, normoactive bowel sounds, no rebound/guarding  MUSCULOSKELETAL:  no clubbing or cyanosis of digits;   PSYCH: A+O x 3  NEUROLOGY: no gross deficits   SKIN: No rashes;     LABS:                        11.6   15.49 )-----------( 431      ( 07 Jul 2021 07:45 )             31.7     07-07    138  |  106  |  14  ----------------------------<  93  4.1   |  20<L>  |  0.97    Ca    9.1      07 Jul 2021 07:45  Phos  3.9     07-07  Mg     2.10     07-07      PT/INR - ( 07 Jul 2021 07:45 )   PT: 14.1 sec;   INR: 1.25 ratio         PTT - ( 07 Jul 2021 07:45 )  PTT:78.6 sec

## 2021-07-07 NOTE — PROGRESS NOTE ADULT - PROBLEM SELECTOR PLAN 1
? acute on chronic PE with Right heart strain,  in the setting of recently running out of home Eliquis. C/b hypoxemia, elevated trops. Also with elevated [id number], but without volume overload on exam. s/p MICU eval  - CT Angio: shows bilateral segmental/subsegmental PE, pulmonary HTN w/ dilated pulmonary artery and RV enlargement (?CTEPH). Also, new PAIGE 0.4cm nodule. TTE shows pulm HTN and right heart strain  -apprec hematology consult recs, will need lifetime anticoagulation, while coumadin maybe favorable if APLS is positive, pt is non-compliant, will transition to eliquis pending Heme f/u for APLS labs  - monitor on telemetry with continuous pulse ox. dopplers neg

## 2021-07-08 ENCOUNTER — TRANSCRIPTION ENCOUNTER (OUTPATIENT)
Age: 51
End: 2021-07-08

## 2021-07-08 VITALS
TEMPERATURE: 98 F | HEART RATE: 78 BPM | DIASTOLIC BLOOD PRESSURE: 72 MMHG | SYSTOLIC BLOOD PRESSURE: 121 MMHG | OXYGEN SATURATION: 96 % | RESPIRATION RATE: 18 BRPM

## 2021-07-08 PROCEDURE — 99239 HOSP IP/OBS DSCHRG MGMT >30: CPT

## 2021-07-08 RX ORDER — APIXABAN 2.5 MG/1
1 TABLET, FILM COATED ORAL
Qty: 0 | Refills: 0 | DISCHARGE

## 2021-07-08 RX ADMIN — GABAPENTIN 100 MILLIGRAM(S): 400 CAPSULE ORAL at 05:55

## 2021-07-08 RX ADMIN — APIXABAN 5 MILLIGRAM(S): 2.5 TABLET, FILM COATED ORAL at 05:55

## 2021-07-08 NOTE — PROGRESS NOTE ADULT - PROBLEM SELECTOR PROBLEM 7
Prolonged QT interval

## 2021-07-08 NOTE — PROGRESS NOTE ADULT - REASON FOR ADMISSION
Pulmonary Embolism

## 2021-07-08 NOTE — PROGRESS NOTE ADULT - PROBLEM SELECTOR PROBLEM 1
Bilateral pulmonary embolism

## 2021-07-08 NOTE — DISCHARGE NOTE PROVIDER - CARE PROVIDER_API CALL
Rip Hinkle)  Hematology; Internal Medicine; Medical Oncology  [address] [place] [address]  Phone: [phone]  Fax: [phone]  Follow Up Time:

## 2021-07-08 NOTE — PROGRESS NOTE ADULT - PROBLEM SELECTOR PROBLEM 4
REGINA (acute kidney injury)

## 2021-07-08 NOTE — PROGRESS NOTE ADULT - PROBLEM SELECTOR PLAN 6
leukocytosis and tachycardic, afebrile  leukocytosis possibly reactive in setting of acute PE, no S/Sx of infxn  lactate wnl  UA negative, CT chest as above, monitor off abx   trend leukocytosis
leukocytosis and tachycardic, afebrile  leukocytosis possibly reactive, no S/Sx of infxn  lactate wnl  UA negative, CT chest as above, monitor off abx   trend leukocytosis
leukocytosis and tachycardic, afebrile  leukocytosis possibly reactive in setting of acute PE, no S/Sx of infxn  lactate wnl  UA negative, CT chest as above, monitor off abx   trend leukocytosis

## 2021-07-08 NOTE — PROGRESS NOTE ADULT - PROBLEM SELECTOR PLAN 2
likely secondary to acute on chronic PE  On 1L home O2 PRN  Admission O2=82% on RA. Patient placed on NRB 15L with improvement in O2 sats to 100%, though with persisting tachypnea in 30s. Escalated to HFNC with stable O2 sats.   - wean high flow,  maintain spO2>90%
likely secondary to acute on chronic PE  On 1L home O2 PRN  Admission O2=82% on RA. Patient placed on NRB 15L with improvement in O2 sats to 100%, though with persisting tachypnea in 30s. Escalated to HFNC with stable O2 sats.   - wean high flow,  maintain spO2>90%  - Sat 94-95% with 2L, 89% on 2L with exertion. Will discharge with on 2L of oxygen and f/u Heme. PCP referral made
likely secondary to acute on chronic PE  On 1L home O2 PRN  Admission O2=82% on RA. Patient placed on NRB 15L with improvement in O2 sats to 100%, though with persisting tachypnea in 30s. Escalated to HFNC with stable O2 sats.   - c/w HFNC at this time, satting 100%. Plan to wean as tolerated to maintain spO2>90%  - obtain comprehensive VBG (check for hypercapnia, lactate)
likely secondary to acute on chronic PE  On 1L home O2 PRN  Admission O2=82% on RA. Patient placed on NRB 15L with improvement in O2 sats to 100%, though with persisting tachypnea in 30s. Escalated to HFNC with stable O2 sats.   - c/w HFNC at this time, satting 100%. Plan to wean as tolerated to maintain spO2>90%  )
likely secondary to acute on chronic PE  On 1L home O2 PRN  Admission O2=82% on RA. Patient placed on NRB 15L with improvement in O2 sats to 100%, though with persisting tachypnea in 30s. Escalated to HFNC with stable O2 sats.   - wean high flow,  maintain spO2>90%
likely secondary to acute on chronic PE  On 1L home O2 PRN  Admission O2=82% on RA. Patient placed on NRB 15L with improvement in O2 sats to 100%, though with persisting tachypnea in 30s. Escalated to HFNC with stable O2 sats.   - wean high flow,  maintain spO2>90%

## 2021-07-08 NOTE — PROGRESS NOTE ADULT - PROBLEM SELECTOR PLAN 8
DVT ppx: On full dose AC  diet: DASH/TLC
DVT ppx: On Heparin gtt for b/l PE  diet: DASH/TLC
DVT ppx: On full dose AC  diet: DASH/TLC

## 2021-07-08 NOTE — PROGRESS NOTE ADULT - PROBLEM SELECTOR PLAN 3
Admission Trop T=323, likely secondary to RV strain from b/l PE, less likely ACS given no active chest pain.   - EKG with TWI  - TTE LVEF 55%, RV strain with dilated RV/RV pressure overload/decreased RV fxn, mod PAH  - Cardiology f/u
Admission Trop T=323, likely secondary to RV strain from b/l PE, less likely ACS given no active chest pain.   - EKG with TWI  - TTE  - Cardiology f/u  - trend trop T, CK, CKMB, elevated troponin 451--450 likely i/s/o acute B/l PE, pending echo
Admission Trop T=323, likely secondary to RV strain from b/l PE, less likely ACS given no active chest pain.   - EKG with TWI  - TTE LVEF 55%, RV strain with dilated RV/RV pressure overload/decreased RV fxn, mod PAH  - Cardiology f/u  - trend trop T, CK, CKMB, elevated troponin 451--450--369 likely i/s/o acute B/l PE
Admission Trop T=323, likely secondary to RV strain from b/l PE, less likely ACS given no active chest pain.   - EKG with TWI  - TTE  - Cardiology f/u  - trend trop T, CK, CKMB, elevated troponin 451--450--369 likely i/s/o acute B/l PE, pending echo
Admission Trop T=323, likely secondary to RV strain from b/l PE, less likely ACS given no active chest pain.   - EKG with TWI  - TTE LVEF 55%, RV strain with dilated RV/RV pressure overload/decreased RV fxn, mod PAH  - Cardiology f/u
Admission Trop T=323, likely secondary to RV strain from b/l PE, less likely ACS given no active chest pain.   - EKG with TWI  - TTE LVEF 55%, RV strain with dilated RV/RV pressure overload/decreased RV fxn, mod PAH  - Cardiology f/u

## 2021-07-08 NOTE — PROGRESS NOTE ADULT - PROBLEM SELECTOR PROBLEM 8
Preventive measure
Flu

## 2021-07-08 NOTE — DISCHARGE NOTE NURSING/CASE MANAGEMENT/SOCIAL WORK - NSDCVIVACCINE_GEN_ALL_CORE_FT
influenza, injectable, quadrivalent, preservative free; 06-Mar-2019 16:01; Karla Whitney (RN); [employer]; [id number] (Exp. Date: 30-Jun-2019); IntraMuscular; Deltoid Left.; 0.5 milliLiter(s); VIS (VIS Published: 07-Aug-2015, VIS Presented: 06-Mar-2019);

## 2021-07-08 NOTE — PROGRESS NOTE ADULT - PROBLEM SELECTOR PLAN 5
K=6.0 on admission resolved   - s/p Regular insulin 5U, D50 and Lokelma 10 x1   - trend BMP
K=6.0 on admission resolved   - s/p Regular insulin 5U, D50 and Lokelma 10 x1   - trend BMP
K=6.0 resolved   - s/p Regular insulin 5U, D50 and Lokelma 10 x1   - trend BMP
K=6.0 on admission resolved   - s/p Regular insulin 5U, D50 and Lokelma 10 x1   - trend BMP
K=6.0 on admission resolved   - s/p Regular insulin 5U, D50 and Lokelma 10 x1   - trend BMP
K=6.0 resolved   - s/p Regular insulin 5U, D50 and Lokelma 10 x1   - trend BMP

## 2021-07-08 NOTE — PROGRESS NOTE ADULT - PROBLEM SELECTOR PLAN 7
4/2021 now 520ms  avoid QT prolonging meds  monitor on tele  cards on board, f/u recs  TTE as above
 4/2021 now 520ms  avoid QT prolonging meds  monitor on tele  cards on board, f/u recs  TTE as above
 4/2021 now 520ms  avoid QT prolonging meds  monitor on tele  cards on board, f/u recs  TTE pending
 4/2021 now 520ms  avoid QT prolonging meds  monitor on tele  cards on board, f/u recs  TTE pending
 4/2021 now 520ms  avoid QT prolonging meds  monitor on tele  cards on board, f/u recs  TTE as above
 4/2021 now 520ms  avoid QT prolonging meds  monitor on tele  cards on board, f/u recs  TTE as above

## 2021-07-08 NOTE — DISCHARGE NOTE PROVIDER - CARE PROVIDERS DIRECT ADDRESSES
[email or web address]

## 2021-07-08 NOTE — PROGRESS NOTE ADULT - SUBJECTIVE AND OBJECTIVE BOX
[facility] Division of Hospital Medicine  Otoniel Diamond MD  Pager [phone]      Patient is a 51y old  Male who presents with a chief complaint of Pulmonary Embolism (08 Jul 2021 11:32)      SUBJECTIVE / OVERNIGHT EVENTS: Improved SOB. Ambulating with no issues    MEDICATIONS  (STANDING):  apixaban 5 milliGRAM(s) Oral every 12 hours  folic acid 1 milliGRAM(s) Oral daily  gabapentin 100 milliGRAM(s) Oral three times a day    MEDICATIONS  (PRN):  acetaminophen   Tablet .. 650 milliGRAM(s) Oral every 6 hours PRN Temp greater or equal to 38C (100.4F), Mild Pain (1 - 3), Moderate Pain (4 - 6)      CAPILLARY BLOOD GLUCOSE        I&O's Summary    07 Jul 2021 07:01  -  08 Jul 2021 07:00  --------------------------------------------------------  IN: 0 mL / OUT: 1300 mL / NET: -1300 mL    08 Jul 2021 07:01  -  08 Jul 2021 13:30  --------------------------------------------------------  IN: 0 mL / OUT: 750 mL / NET: -750 mL        PHYSICAL EXAM:  Vital Signs Last 24 Hrs  T(C): 36.7 (08 Jul 2021 13:00), Max: 36.7 (08 Jul 2021 05:00)  T(F): 98.1 (08 Jul 2021 13:00), Max: 98.1 (08 Jul 2021 13:00)  HR: 78 (08 Jul 2021 13:00) (78 - 102)  BP: 121/72 (08 Jul 2021 13:00) (121/72 - 122/86)  BP(mean): --  RR: 18 (08 Jul 2021 13:00) (18 - 20)  SpO2: 96% (08 Jul 2021 13:00) (94% - 99%)  CONSTITUTIONAL: NAD  EYES: conjunctiva and sclera clear  ENMT: mmm  NECK: Supple,  RESPIRATORY: Normal respiratory effort; lungs are clear to auscultation bilaterally  CARDIOVASCULAR: Regular rate and rhythm, + S1 and S2  ABDOMEN: Nontender to palpation, normoactive bowel sounds, no rebound/guarding  MUSCULOSKELETAL:  no clubbing or cyanosis of digits;   PSYCH: A+O x 3  NEUROLOGY: no gross deficits   SKIN: No rashes;     LABS:                        11.6   15.49 )-----------( 431      ( 07 Jul 2021 07:45 )             31.7     07-07    138  |  106  |  14  ----------------------------<  93  4.1   |  20<L>  |  0.97    Ca    9.1      07 Jul 2021 07:45  Phos  3.9     07-07  Mg     2.10     07-07      PT/INR - ( 07 Jul 2021 07:45 )   PT: 14.1 sec;   INR: 1.25 ratio         PTT - ( 07 Jul 2021 07:45 )  PTT:78.6 sec

## 2021-07-08 NOTE — DISCHARGE NOTE PROVIDER - HOSPITAL COURSE
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 He reports that the sensation of racing heart, sharp non-radiating chest pain, and Shortness of breath started 7 days ago, and has persistently worsened since then. Reports feeling like he was going to pass out spurring him to come to the ED. He reports these symptoms feel similar to past episodes of PE, and that he has taken his Eliquis as prescribed since prior admission, until he ran out of Eliquis yesterday. Denies fever, chills, pleuritic chest pain, nausea, vomiting, abdominal pain, diarrhea, constipation, recent leg swelling or pain. Reports he goes to [facility] for PCP, but has no history of cancer. In the ED: afebrile, KU=938, QC=695/62, RR=18, O2=82% on RA. Patient placed on NRB 15L with improvement in O2 sats to 100%, though with persisting tachypnea in 30s. Escalated to HFNC with stable O2 sats. Pertinent labs: wbc=25, K=6.0, Cr=1.77 (baseline 0.95 May 2021), Trop T=323, urfARA=3647, Tbili=3.4, AST=77. Imaging: CT Angio: shows bilateral segmental/subsegmental PE, pulmonary HTN w/ dilated pulmonary artery and RV enlargement (?CTEPH). Also, new PAIGE 0.4cm nodule. Given 1L NS with improvement in tachycardia. Patient started on Heparin drip, and given Reg insulin and D5 for hyperkalemia.    Patient was admitted for bilateral PE in the setting of running out of his home eliquis. Troponins were elevated. EKG showed TWI. MICU evaluated patient. CT angio showed bilateral PE and pulmonary hypertension. TTE showed right heart strain and pulmonary hypertension. Heme/onc was consulted, who recommended life long anticoagulation. Patient was non-compliant with eliquis, and thus can be continued on eliquis at home. APLS labs were sent and patient will follow up with hematology outpatient for results. LE dopplers negative for DVT. Patient was weaned from HFNC to home oxygen level of 1L via NC. Patient was found with REGINA. Patient was given fluids and REGINA resolved. Patient was found with leukocytosis and tachycardia. Lactate was normal, UA negative, and patient was monitored off antibiotics.     On 7/8/2021 this case was reviewed with Dr. Diamond, the patient is medically stable and optimized for discharge. All medications were reviewed and prescriptions were sent to mutually agreed upon pharmacy.

## 2021-07-08 NOTE — PROGRESS NOTE ADULT - PROBLEM SELECTOR PLAN 1
? acute on chronic PE with Right heart strain,  in the setting of recently running out of home Eliquis. C/b hypoxemia, elevated trops. Also with elevated erzUSV=6518, but without volume overload on exam. s/p MICU eval  - CT Angio: shows bilateral segmental/subsegmental PE, pulmonary HTN w/ dilated pulmonary artery and RV enlargement (?CTEPH). Also, new PAIGE 0.4cm nodule. TTE shows pulm HTN and right heart strain  -apprec hematology consult recs, will need lifetime anticoagulation, while coumadin maybe favorable if APLS is positive, pt is non-compliant, will transition to eliquis pending Heme f/u for APLS labs  - monitor on telemetry with continuous pulse ox. dopplers neg

## 2021-07-08 NOTE — DISCHARGE NOTE PROVIDER - NSFOLLOWUPCLINICS_GEN_ALL_ED_FT
[facility]  Hematology/Oncology  [address] [place] [address]  Phone: [phone]  Fax:     [facility] [place]  Internal Medicine  [address] [place] [address]  Phone: [phone]  Fax: [phone]

## 2021-07-08 NOTE — PROGRESS NOTE ADULT - PROBLEM SELECTOR PROBLEM 2
Acute on chronic respiratory failure with hypoxia

## 2021-07-08 NOTE — DISCHARGE NOTE PROVIDER - NSDCCPCAREPLAN_GEN_ALL_CORE_FT
PRINCIPAL DISCHARGE DIAGNOSIS  Diagnosis: Bilateral pulmonary embolism  Assessment and Plan of Treatment: You were admitted for blood clots in your lungs in the setting of running out of your home eliquis. CT angio scan of your chest confirmed blood clots. Echocardiogram showed right heart strain and pulmonary hypertension. Heme/onc was consulted, who recommended life long anticoagulation. You were non-compliant with eliquis, and thus can be continued on eliquis at home. APLS labs were sent and you will follow up with hematology outpatient for results. Lower extremity dopplers negative for blood clot in your legs. You were weaned from high flow nasal cannula to home oxygen level of 1L via NC.      SECONDARY DISCHARGE DIAGNOSES  Diagnosis: H/O sickle cell trait  Assessment and Plan of Treatment: Heme/onc was consulted, who recommended life long anticoagulation. You were non-compliant with eliquis, and thus can be continued on eliquis at home. APLS labs were sent and you will follow up with hematology outpatient for results.    Diagnosis: REGINA (acute kidney injury)  Assessment and Plan of Treatment: You were found with acute kidney injury. You were given fluids and acute kidney injury resolved. Please follow up with your primary care physician after discharge for continued monitoring and management.

## 2021-07-08 NOTE — PROGRESS NOTE ADULT - PROBLEM SELECTOR PLAN 4
Cr=1.77 (baseline 0.95 May 2021)  - given 1L NS in ED  - Bladder scan to assess for urinary retention  - avoid nephrotoxic agents  - REGINA resolved
Cr=1.77 (baseline 0.95 May 2021)  - avoid nephrotoxic agents  - REGINA resolved
Cr=1.77 (baseline 0.95 May 2021)  - given 1L NS in ED  - Bladder scan to assess for urinary retention  - avoid nephrotoxic agents  - REGINA resolved
Cr=1.77 (baseline 0.95 May 2021)  - given 1L NS in ED  - Bladder scan to assess for urinary retention  - avoid nephrotoxic agents  - f/u Ucr, Uurea nitrogen to calculate FEUrea, trend SCr
Cr=1.77 (baseline 0.95 May 2021)  - given 1L NS in ED  - avoid nephrotoxic agents  - REGINA resolved
Cr=1.77 (baseline 0.95 May 2021)  - given 1L NS in ED  - avoid nephrotoxic agents  - REGINA resolved

## 2021-07-16 ENCOUNTER — APPOINTMENT (OUTPATIENT)
Dept: CARE COORDINATION | Facility: HOME HEALTH | Age: 51
End: 2021-07-16
Payer: MEDICAID

## 2021-07-16 DIAGNOSIS — I25.10 ATHEROSCLEROTIC HEART DISEASE OF NATIVE CORONARY ARTERY W/OUT ANGINA PECTORIS: ICD-10-CM

## 2021-07-16 PROCEDURE — 99348 HOME/RES VST EST LOW MDM 30: CPT | Mod: 95

## 2021-07-16 RX ORDER — ASCORBIC ACID
100 CRYSTALS ORAL DAILY
Refills: 0 | Status: ACTIVE | COMMUNITY
Start: 2021-07-16

## 2021-07-16 NOTE — PHYSICAL EXAM
[Normal] : no acute distress, well nourished, well developed and well-appearing [No Respiratory Distress] : no respiratory distress  [No Accessory Muscle Use] : no accessory muscle use [Normal Affect] : the affect was normal [Normal Mood] : the mood was normal

## 2021-07-16 NOTE — HISTORY OF PRESENT ILLNESS
[Home] : at home, [unfilled] , at the time of the visit. [Other Location: e.g. Home (Enter Location, City,State)___] : at [unfilled] [Verbal consent obtained from patient] : the patient, [unfilled] [FreeTextEntry1] : f/u hospitalization for SOB-+PE [de-identified] : 52 y/o/m with pmhx of CAD, MI, sickle cell on 1LNC at home prn, hx of PE 2/2020 and 4/2021 on eliquis presented to ED @ [facility] with SOB, CP, palpitations. Patient admitted with +PE as shown on CT angio.  LE dopplers negative. Patient d/c home with no home care needs.\par PE: denies CP, SOB, palpitation, lightheadedness, dizziness. PAtient compliant with regimen, Heme appt @ [facility] TBD\par CAD: denies any worsening s/s, patient has f/u appt with PCP on 7/21\par sickle cell: denies any worsening s/s, on home o2 prn

## 2021-07-16 NOTE — REVIEW OF SYSTEMS
[Negative] : Musculoskeletal [FreeTextEntry5] : see hpi [FreeTextEntry6] : see hpi [de-identified] : see hpi

## 2021-07-21 ENCOUNTER — APPOINTMENT (OUTPATIENT)
Dept: INTERNAL MEDICINE | Facility: CLINIC | Age: 51
End: 2021-07-21

## 2021-07-30 RX ORDER — APIXABAN 5 MG/1
5 TABLET, FILM COATED ORAL
Qty: 60 | Refills: 5 | Status: ACTIVE | COMMUNITY
Start: 2020-03-03 | End: 1900-01-01

## 2021-08-02 ENCOUNTER — NON-APPOINTMENT (OUTPATIENT)
Age: 51
End: 2021-08-02

## 2021-08-27 ENCOUNTER — APPOINTMENT (OUTPATIENT)
Dept: INTERNAL MEDICINE | Facility: CLINIC | Age: 51
End: 2021-08-27

## 2021-09-10 ENCOUNTER — OUTPATIENT (OUTPATIENT)
Dept: OUTPATIENT SERVICES | Facility: HOSPITAL | Age: 51
LOS: 1 days | Discharge: HOME | End: 2021-09-10

## 2021-09-10 DIAGNOSIS — D72.829 ELEVATED WHITE BLOOD CELL COUNT, UNSPECIFIED: ICD-10-CM

## 2021-09-10 DIAGNOSIS — Z98.890 OTHER SPECIFIED POSTPROCEDURAL STATES: Chronic | ICD-10-CM

## 2021-09-13 NOTE — HISTORY OF PRESENT ILLNESS
[Disease:__________________________] : Disease: [unfilled] [de-identified] : Mr. Muhammad is a 51 year old man with ?? Hgb SC dz and no h/o ongoing medical care or hemoglobinopathy related complications, who was treated for PE 2/2021.  \par \par On 2/13/20, he began to develop increased SOB and chest pain. The pain was sudden in onset. He had no other significant sxs. \par He was tachycardiac on admission with elevated troponin levels at 243 then 246.  \par CTA showed bilateral segmental and subsegmental PE long with bilateral hilar LAD.  Bilateral patchy opacities were seen which were also new. The LAD was new vs prior CTA done in 3/2019. \par Echo showed dilated RV with a decrease in RV systolic functions vs prior study from 3/2019. There was also moderate pulmonary hypertension. \par Before starting Heparin, he had a normal PT and aPTT. He was placed on Eliquis  5mg po 2X/D. \par \par Because of persistent monocytosis as well  as presence smudge cells and NRBC, he had a BM done on 2/13/20 which showed \par erythroid - predominant trilineage hematopoiesis, mild polytypic plasmacytosis  and a minute population of CD5(+) polytypic B-cells.\par A similar CD5(+) population was seen in PB. 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 The pt gives a h/o a brother with what may have been SS dz.\par His past h/o is other wise unremarkable. he was seen at [facility] on 3/12/19 with hiccups with epigastric pain, BRIZUELA and abnormal EKG and had a (-) w/u.\par Mild to moderate leukocytosis was present which downtrended at time at discharge although WBC was somewhat over 12 K . Diff was unremarkable. \par He  had no h/o rheumatic complaints , fevers, rash, neurologic c/o. \par In 2019 he was seen at [facility] with leukocytosis and clinical findings of sepsis in setting of a viral infection.\par  [de-identified] : Had 2nd unprovoked PE\par was lost to f/u until adm to [facility] 4/30/21 with\par acute onset CP, SOB similar to prior PE.\par CTA showed:\par Embolus within the anterior segmental and subsegmental right upper lobe pulmonary arteries is smaller yet has an acute-appearing morphology. Otherwise, there is interval resolution of pulmonary embolus. This may represent subacute embolus although acute embolus is not excluded.\par \par Decreased perihilar opacities. Etiology is uncertain.\par \par No evidence DVT\par Resolutions of pulm c/o, no SOB, BRIZUELA, CP\par Treated with enoxaparin; now on eliquis 5 mg po 2x/d\par

## 2021-09-13 NOTE — ASSESSMENT
[Palliative Care Plan] : not applicable at this time [FreeTextEntry1] : Unprovoked second PE in a 50 y/o man, with Hgb SC disease.\par He had not been affected previously by his hemoglobinopathy, with Hgb in 13 range.\par He has had no painful episodes. He has had no infections or leg ulcers. Except for transient blurry vision upon awakening he has had no visual c/o.\par He has a polytypic CD5(+) B-cell population of unclear significance. Such populations maybe associated with autoimmune dz (SANAZ,Rheumatoid factor, ESR were drawn).\par He was supposed to have had thrombophilia evaluation done 2/2020  including anticardiolipin antibodies, anti-beta1 glycoprotein 2 antibodies, factor V Leiden mut, prothrombin gene mut, levels of protein C,S and antithrombin III were checked.  \par Immunoprotein studies were ordered\par No results seen\par Never came back for f/u\par Has no PMD\par f/u CT chest as well as TTE were for planned for f/u  of pulm infiltrates as well of evidence in prior TTE of pulm HTN and RV dysfunction.\par CTA 4/2021 shows decrease in pulm infiltrates of unclear origins\par CBC results reviewed and d/w pt- WBC 9.25, Hgb 14.4, Plt 385K, 3.5% retics, nl diff\par leukocytosis, monocytosis both resolved\par \par Now will need lifelong anticoagulation regardless of thrombophilia w/u.\par Cont. eliquis 5 mg po 2x/d which was renewed.\par Not even clear how long he staid on eliquis after first PE\par

## 2021-09-13 NOTE — REASON FOR VISIT
[Follow-Up Visit] : a follow-up visit for [Hemoglobinopathy] : hemoglobinopathy [FreeTextEntry2] : Pulmonary Embolism, Recurrent

## 2021-09-13 NOTE — PHYSICAL EXAM
[Fully active, able to carry on all pre-disease performance without restriction] : Status 0 - Fully active, able to carry on all pre-disease performance without restriction [Normal] : affect appropriate [de-identified] : no CVAT

## 2021-10-15 ENCOUNTER — OUTPATIENT (OUTPATIENT)
Dept: OUTPATIENT SERVICES | Facility: HOSPITAL | Age: 51
LOS: 1 days | Discharge: HOME | End: 2021-10-15

## 2021-10-15 DIAGNOSIS — Z98.890 OTHER SPECIFIED POSTPROCEDURAL STATES: Chronic | ICD-10-CM

## 2021-10-15 DIAGNOSIS — D72.829 ELEVATED WHITE BLOOD CELL COUNT, UNSPECIFIED: ICD-10-CM

## 2021-10-18 ENCOUNTER — RESULT REVIEW (OUTPATIENT)
Age: 51
End: 2021-10-18

## 2021-10-18 ENCOUNTER — APPOINTMENT (OUTPATIENT)
Dept: HEMATOLOGY ONCOLOGY | Facility: CLINIC | Age: 51
End: 2021-10-18
Payer: MEDICAID

## 2021-10-18 VITALS
WEIGHT: 174.61 LBS | BODY MASS INDEX: 24.44 KG/M2 | RESPIRATION RATE: 17 BRPM | HEIGHT: 71.06 IN | HEART RATE: 90 BPM | DIASTOLIC BLOOD PRESSURE: 78 MMHG | OXYGEN SATURATION: 99 % | SYSTOLIC BLOOD PRESSURE: 122 MMHG | TEMPERATURE: 97 F

## 2021-10-18 DIAGNOSIS — D57.20 SICKLE-CELL/HB-C DISEASE W/OUT CRISIS: ICD-10-CM

## 2021-10-18 DIAGNOSIS — Z80.42 FAMILY HISTORY OF MALIGNANT NEOPLASM OF PROSTATE: ICD-10-CM

## 2021-10-18 DIAGNOSIS — I27.20 PULMONARY HYPERTENSION, UNSPECIFIED: ICD-10-CM

## 2021-10-18 DIAGNOSIS — D68.59 OTHER PRIMARY THROMBOPHILIA: ICD-10-CM

## 2021-10-18 DIAGNOSIS — I26.99 OTHER PULMONARY EMBOLISM W/OUT ACUTE COR PULMONALE: ICD-10-CM

## 2021-10-18 LAB
ALBUMIN SERPL ELPH-MCNC: 4.4 G/DL
ALP BLD-CCNC: 84 U/L
ALT SERPL-CCNC: 11 U/L
ANION GAP SERPL CALC-SCNC: 12 MMOL/L
ANISOCYTOSIS BLD QL: SLIGHT — SIGNIFICANT CHANGE UP
AST SERPL-CCNC: 24 U/L
BASOPHILS # BLD AUTO: 0 K/UL — SIGNIFICANT CHANGE UP (ref 0–0.2)
BASOPHILS NFR BLD AUTO: 0 % — SIGNIFICANT CHANGE UP (ref 0–2)
BILIRUB SERPL-MCNC: 1.5 MG/DL
BUN SERPL-MCNC: 6 MG/DL
CALCIUM SERPL-MCNC: 9.5 MG/DL
CHLORIDE SERPL-SCNC: 107 MMOL/L
CO2 SERPL-SCNC: 23 MMOL/L
CREAT SERPL-MCNC: 0.93 MG/DL
ELLIPTOCYTES BLD QL SMEAR: SLIGHT — SIGNIFICANT CHANGE UP
EOSINOPHIL # BLD AUTO: 0.51 K/UL — HIGH (ref 0–0.5)
EOSINOPHIL NFR BLD AUTO: 5 % — SIGNIFICANT CHANGE UP (ref 0–6)
GLUCOSE SERPL-MCNC: 85 MG/DL
HCT VFR BLD CALC: 35.9 % — LOW (ref 39–50)
HGB BLD-MCNC: 13 G/DL — SIGNIFICANT CHANGE UP (ref 13–17)
LDH SERPL-CCNC: 311 U/L
LYMPHOCYTES # BLD AUTO: 3.05 K/UL — SIGNIFICANT CHANGE UP (ref 1–3.3)
LYMPHOCYTES # BLD AUTO: 30 % — SIGNIFICANT CHANGE UP (ref 13–44)
MCHC RBC-ENTMCNC: 28.1 PG — SIGNIFICANT CHANGE UP (ref 27–34)
MCHC RBC-ENTMCNC: 36.2 G/DL — HIGH (ref 32–36)
MCV RBC AUTO: 77.5 FL — LOW (ref 80–100)
MONOCYTES # BLD AUTO: 1.02 K/UL — HIGH (ref 0–0.9)
MONOCYTES NFR BLD AUTO: 10 % — SIGNIFICANT CHANGE UP (ref 2–14)
NEUTROPHILS # BLD AUTO: 5.58 K/UL — SIGNIFICANT CHANGE UP (ref 1.8–7.4)
NEUTROPHILS NFR BLD AUTO: 55 % — SIGNIFICANT CHANGE UP (ref 43–77)
NRBC # BLD: 5 /100 — HIGH (ref 0–0)
NRBC # BLD: SIGNIFICANT CHANGE UP /100 WBCS (ref 0–0)
PLAT MORPH BLD: NORMAL — SIGNIFICANT CHANGE UP
PLATELET # BLD AUTO: 386 K/UL — SIGNIFICANT CHANGE UP (ref 150–400)
POIKILOCYTOSIS BLD QL AUTO: SLIGHT — SIGNIFICANT CHANGE UP
POLYCHROMASIA BLD QL SMEAR: SLIGHT — SIGNIFICANT CHANGE UP
POTASSIUM SERPL-SCNC: 4.3 MMOL/L
PROT SERPL-MCNC: 7.4 G/DL
RBC # BLD: 4.63 M/UL — SIGNIFICANT CHANGE UP (ref 4.2–5.8)
RBC # FLD: 19.7 % — HIGH (ref 10.3–14.5)
RBC BLD AUTO: ABNORMAL
SCHISTOCYTES BLD QL AUTO: SLIGHT — SIGNIFICANT CHANGE UP
SODIUM SERPL-SCNC: 142 MMOL/L
TARGETS BLD QL SMEAR: SLIGHT — SIGNIFICANT CHANGE UP
WBC # BLD: 10.15 K/UL — SIGNIFICANT CHANGE UP (ref 3.8–10.5)
WBC # FLD AUTO: 10.15 K/UL — SIGNIFICANT CHANGE UP (ref 3.8–10.5)

## 2021-10-18 PROCEDURE — 99214 OFFICE O/P EST MOD 30 MIN: CPT

## 2021-10-18 RX ORDER — APIXABAN 5 MG/1
5 TABLET, FILM COATED ORAL
Qty: 270 | Refills: 3 | Status: ACTIVE | COMMUNITY
Start: 2021-10-18 | End: 1900-01-01

## 2021-10-18 NOTE — ASSESSMENT
[Palliative Care Plan] : not applicable at this time [FreeTextEntry1] : Unprovoked third PE in a 50 y/o man, with Hgb SC disease.\par He had not been affected previously by his hemoglobinopathy, with Hgb in 13 range.\par He has had no painful episodes. He has had no infections or leg ulcers. Except for transient blurry vision upon awakening he has had no visual c/o.\par He has a minute polytypic CD5(+) B-cell population of unclear significance in marrow. Such populations may be associated with autoimmune dz \par He had thrombophilia evaluation done 2/2020  including anticardiolipin antibodies, anti-beta1 glycoprotein 2 antibodies, factor V Leiden mut, prothrombin gene mut, levels of protein C,S and antithrombin III were checked.  \par  \par No results seen\par Never came back for f/u\par Has no PMD\par f/u CT chest as well as TTE were for planned for f/u  of pulm infiltrates as well of evidence in prior TTE of pulm HTN and RV dysfunction.\par CTA 4/2021 shows decrease in pulm infiltrates of unclear origins\par CBC results reviewed and d/w pt- WBC 10.15, Hgb 13.0, Plt 386K, 3.5% retics, \par minimal incr in monos, eos\par  \par Needs lifelong anticoagulation regardless of thrombophilia w/u.\par Cont. eliquis 5 mg po 2x/d which was renewed.\par He says he will be compliant now.  Three month supply of eliquis\par ordered today.\par Needs assessment of pulmonary HTN, lung nodule, ref to Dr. Finn\par Review recent w/u done at [facility] - APLS abs sent per note\par Rec. opth eval - given phone for [facility] out[facility]\par RV 3 mos\par \par PT TELEPHONE NUMBER: [phone]\par

## 2021-10-18 NOTE — PHYSICAL EXAM
[Fully active, able to carry on all pre-disease performance without restriction] : Status 0 - Fully active, able to carry on all pre-disease performance without restriction [Normal] : affect appropriate [de-identified] : no CVAT

## 2021-10-18 NOTE — HISTORY OF PRESENT ILLNESS
[Disease:__________________________] : Disease: [unfilled] [de-identified] : Mr. Muhammad is a 51 year old man with ?? Hgb SC dz and no h/o ongoing medical care or hemoglobinopathy related complications, who was treated for PE 2/2021.  \par \par On 2/13/20, he began to develop increased SOB and chest pain. The pain was sudden in onset. He had no other significant sxs. \par He was tachycardiac on admission with elevated troponin levels at 243 then 246.  \par CTA showed bilateral segmental and subsegmental PE long with bilateral hilar LAD.  Bilateral patchy opacities were seen which were also new. The LAD was new vs prior CTA done in 3/2019. \par Echo showed dilated RV with a decrease in RV systolic functions vs prior study from 3/2019. There was also moderate pulmonary hypertension. \par Before starting Heparin, he had a normal PT and aPTT. He was placed on Eliquis  5mg po 2X/D. \par \par Because of persistent monocytosis as well  as presence smudge cells and NRBC, he had a BM done on 2/13/20 which showed \par erythroid - predominant trilineage hematopoiesis, mild polytypic plasmacytosis  and a minute population of CD5(+) polytypic B-cells.\par A similar CD5(+) population was seen in PB. 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 The pt gives a h/o a brother with what may have been SS dz.\par His past h/o is other wise unremarkable. he was seen at [facility] on 3/12/19 with hiccups with epigastric pain, BRIZUELA and abnormal EKG and had a (-) w/u.\par Mild to moderate leukocytosis was present which downtrended at time at discharge although WBC was somewhat over 12 K . Diff was unremarkable. \par He  had no h/o rheumatic complaints , fevers, rash, neurologic c/o. \par In 2019 he was seen at [facility] with leukocytosis and clinical findings of sepsis in setting of a viral infection.\par  [de-identified] : Had 2nd and 3rd unprovoked PE: adm to [facility] 4/30/21 and 7/8/21\par had been lost to f/u until adm to [facility] 4/30/21 with\par acute onset CP, SOB similar to prior PE.\par CTA showed:\par Embolus within the anterior segmental and subsegmental right upper lobe pulmonary arteries is smaller yet has an acute-appearing morphology. Otherwise, there is interval resolution of pulmonary embolus. This may represent subacute embolus although acute embolus is not excluded.\par Decreased perihilar opacities. Etiology is uncertain.\par No evidence DVT\par readmit 7/2021:\par Says had ran out of eliquis the day before\par Troponins were elevated. EKG showed TWI.   \par CT angio showed bilateral PE. \par TTE showed right heart strain and pulmonary hypertension. \par Patient was non-compliant with eliquis, and thus can be \par continued on eliquis at home. APLS labs were sent and patient will follow up \par with hematology outpatient for results. LE dopplers negative for DVT.  Oxygenation improved\par and REGINA resolved with IVFs. Received  fluids and REGINA resolved. Patient was found with \par leukocytosis and tachycardia. Lactate was normal, UA negative, and patient was \par monitored off antibiotics. \par Now on eliquis 5 mg po 2x/d\par No ongoing cardiac or pulm c/o\par

## 2022-01-20 RX ORDER — APIXABAN 5 MG/1
5 TABLET, FILM COATED ORAL
Qty: 60 | Refills: 3 | Status: ACTIVE | COMMUNITY
Start: 2022-01-20 | End: 1900-01-01

## 2022-03-06 NOTE — ED ADULT NURSE NOTE - NS ED PATIENT SAFETY CONCERN
03/02/2022    CC: Depression (PHQ9)  .        HPI  This 47-year-old patient presents at this time for RS PHQ-9 evaluation.       Subjective   Franklin Hood is a 47 y.o. male.      The following portions of the patient's history were reviewed and updated as appropriate: allergies, current medications, past family history, past medical history, past social history, past surgical history and problem list.    Problem List  There is no problem list on file for this patient.      Past Medical History  History reviewed. No pertinent past medical history.    Surgical History  History reviewed. No pertinent surgical history.    Family History  Family History   Problem Relation Age of Onset   • No Known Problems Mother    • No Known Problems Father        Social History  Social History    Tobacco Use      Smoking status: Never Smoker      Smokeless tobacco: Current User        Types: Chew       Is the Patient a current tobacco user? No    Allergies  No Known Allergies    Current Medications  No current outpatient medications on file.     Review of System  Review of Systems   Eyes: Negative.    Respiratory: Negative.    Cardiovascular: Negative.    Gastrointestinal: Negative.    Genitourinary: Negative for penile pain.   Musculoskeletal: Negative.    Skin: Negative.      I have reviewed and confirmed the accuracy of the ROS as documented by the MA/LPN/RN Diomedes Pang MD    Vitals:    03/02/22 0911   BP: 120/78   Resp: 16     Body mass index is 20.8 kg/m².    Objective     Physical Exam  Physical Exam  Vitals and nursing note reviewed.   Constitutional:       Appearance: Normal appearance.   HENT:      Head: Normocephalic and atraumatic.      Right Ear: Tympanic membrane normal.      Left Ear: Tympanic membrane normal.      Nose: Nose normal.   Cardiovascular:      Rate and Rhythm: Regular rhythm.      Heart sounds: Normal heart sounds.   Pulmonary:      Breath sounds: Normal breath sounds.   Abdominal:      Palpations:  Abdomen is soft.   Musculoskeletal:      Cervical back: Neck supple.         Assessment/Plan      This 47-year-old patient presents at this time for follow-up.  He had presented originally complaining of pain in the pelvis region.  He relates it hurts every day he describes this pain as 6/10 in intensity involving the pelvic brim the scrotum the penis and testicles.    pH Q test was administered today for depression.  The patient's total score was 21 this represents major depression.  Note is made that the patient had previously been on antidepressants but stopped taking them because he felt they were not effective or he felt that they were interacting with his alcohol.  He relates that he is subsequently stopped drinking alcohol.    Patient is very upset that all of his tests for sexually transmitted disease have been negative.  He is stated repeatedly that something is just not right in his pelvic region.  We will see the patient for discussion of starting antidepressants but will also get evaluation from urology regarding the pelvic pain.  Total time of this visit was 30 minutes.            Diagnoses and all orders for this visit:    1. Pelvic pain (Primary)  Comments:  Etiology undetermined    2. Dysthymia  Comments:  We will discussed with patient starting antidepressant including possible side effects and benefits with his next visit.      Plan:  1.)  Referral to urology for evaluation of pelvic pain  2.)  Follow-up for discussion of antidepressants.       Diomedes Pang MD  03/02/2022   No

## 2022-03-07 ENCOUNTER — OUTPATIENT (OUTPATIENT)
Dept: OUTPATIENT SERVICES | Facility: HOSPITAL | Age: 52
LOS: 1 days | Discharge: HOME | End: 2022-03-07

## 2022-03-07 DIAGNOSIS — D72.829 ELEVATED WHITE BLOOD CELL COUNT, UNSPECIFIED: ICD-10-CM

## 2022-03-07 DIAGNOSIS — Z98.890 OTHER SPECIFIED POSTPROCEDURAL STATES: Chronic | ICD-10-CM

## 2022-03-08 ENCOUNTER — APPOINTMENT (OUTPATIENT)
Dept: HEMATOLOGY ONCOLOGY | Facility: CLINIC | Age: 52
End: 2022-03-08

## 2022-03-22 NOTE — H&P ADULT - NSTELEHEALTH_GEN_ALL_CORE
Blood pressure improved but still over goal of less than 140/80  Recommend to increase lisinopril to 40 mg daily  Cleared for surgery given BP less than 150/90  
Pt presents for BP check    148/88  Left arm  Sitting  Reg cuff  
No

## 2022-05-08 ENCOUNTER — INPATIENT (INPATIENT)
Facility: HOSPITAL | Age: 52
LOS: 0 days | Discharge: OTHER INSTITUTION | End: 2022-05-09
Attending: INTERNAL MEDICINE | Admitting: INTERNAL MEDICINE
Payer: MEDICAID

## 2022-05-08 VITALS
TEMPERATURE: 100 F | RESPIRATION RATE: 22 BRPM | WEIGHT: 179.9 LBS | SYSTOLIC BLOOD PRESSURE: 134 MMHG | HEART RATE: 113 BPM | OXYGEN SATURATION: 87 % | DIASTOLIC BLOOD PRESSURE: 90 MMHG | HEIGHT: 72 IN

## 2022-05-08 DIAGNOSIS — J96.01 ACUTE RESPIRATORY FAILURE WITH HYPOXIA: ICD-10-CM

## 2022-05-08 DIAGNOSIS — R77.8 OTHER SPECIFIED ABNORMALITIES OF PLASMA PROTEINS: ICD-10-CM

## 2022-05-08 DIAGNOSIS — I48.0 PAROXYSMAL ATRIAL FIBRILLATION: ICD-10-CM

## 2022-05-08 DIAGNOSIS — J18.9 PNEUMONIA, UNSPECIFIED ORGANISM: ICD-10-CM

## 2022-05-08 DIAGNOSIS — Z98.890 OTHER SPECIFIED POSTPROCEDURAL STATES: Chronic | ICD-10-CM

## 2022-05-08 LAB
ALBUMIN SERPL ELPH-MCNC: 3.4 G/DL — SIGNIFICANT CHANGE UP (ref 3.3–5)
ALP SERPL-CCNC: 94 U/L — SIGNIFICANT CHANGE UP (ref 40–120)
ALT FLD-CCNC: 58 U/L — SIGNIFICANT CHANGE UP (ref 12–78)
ANION GAP SERPL CALC-SCNC: 3 MMOL/L — LOW (ref 5–17)
ANISOCYTOSIS BLD QL: SLIGHT — SIGNIFICANT CHANGE UP
AST SERPL-CCNC: 60 U/L — HIGH (ref 15–37)
BASE EXCESS BLDV CALC-SCNC: 0.1 MMOL/L — SIGNIFICANT CHANGE UP (ref -2–3)
BASOPHILS # BLD AUTO: 0.09 K/UL — SIGNIFICANT CHANGE UP (ref 0–0.2)
BASOPHILS NFR BLD AUTO: 0.6 % — SIGNIFICANT CHANGE UP (ref 0–2)
BILIRUB SERPL-MCNC: 2.5 MG/DL — HIGH (ref 0.2–1.2)
BLOOD GAS COMMENTS, VENOUS: SIGNIFICANT CHANGE UP
BUN SERPL-MCNC: 14 MG/DL — SIGNIFICANT CHANGE UP (ref 7–23)
CALCIUM SERPL-MCNC: 8.3 MG/DL — LOW (ref 8.5–10.1)
CHLORIDE BLDV-SCNC: 108 MMOL/L — HIGH (ref 98–107)
CHLORIDE SERPL-SCNC: 110 MMOL/L — HIGH (ref 96–108)
CO2 BLDV-SCNC: 26 MMOL/L — SIGNIFICANT CHANGE UP (ref 22–26)
CO2 SERPL-SCNC: 27 MMOL/L — SIGNIFICANT CHANGE UP (ref 22–31)
CREAT SERPL-MCNC: 1.27 MG/DL — SIGNIFICANT CHANGE UP (ref 0.5–1.3)
D DIMER BLD IA.RAPID-MCNC: 1214 NG/ML DDU — HIGH
EGFR: 69 ML/MIN/1.73M2 — SIGNIFICANT CHANGE UP
ELLIPTOCYTES BLD QL SMEAR: SLIGHT — SIGNIFICANT CHANGE UP
EOSINOPHIL # BLD AUTO: 0.15 K/UL — SIGNIFICANT CHANGE UP (ref 0–0.5)
EOSINOPHIL NFR BLD AUTO: 1.1 % — SIGNIFICANT CHANGE UP (ref 0–6)
FLUAV AG NPH QL: SIGNIFICANT CHANGE UP
FLUBV AG NPH QL: SIGNIFICANT CHANGE UP
GAS PNL BLDV: 137 MMOL/L — SIGNIFICANT CHANGE UP (ref 136–145)
GAS PNL BLDV: SIGNIFICANT CHANGE UP
GAS PNL BLDV: SIGNIFICANT CHANGE UP
GLUCOSE BLDV-MCNC: 91 MG/DL — SIGNIFICANT CHANGE UP (ref 65–95)
GLUCOSE SERPL-MCNC: 95 MG/DL — SIGNIFICANT CHANGE UP (ref 70–99)
HCO3 BLDV-SCNC: 25 MMOL/L — SIGNIFICANT CHANGE UP (ref 22–28)
HCT VFR BLD CALC: 30.9 % — LOW (ref 39–50)
HCT VFR BLDA CALC: 36 % — LOW (ref 37–47)
HGB BLD CALC-MCNC: 12 G/DL — LOW (ref 12.6–17.4)
HGB BLD-MCNC: 11.4 G/DL — LOW (ref 13–17)
HOROWITZ INDEX BLDV+IHG-RTO: 0.21 — SIGNIFICANT CHANGE UP
HYPOCHROMIA BLD QL: SLIGHT — SIGNIFICANT CHANGE UP
IMM GRANULOCYTES NFR BLD AUTO: 0.4 % — SIGNIFICANT CHANGE UP (ref 0–1.5)
LACTATE BLDV-MCNC: 1 MMOL/L — SIGNIFICANT CHANGE UP (ref 0.56–1.39)
LYMPHOCYTES # BLD AUTO: 25.3 % — SIGNIFICANT CHANGE UP (ref 13–44)
LYMPHOCYTES # BLD AUTO: 3.51 K/UL — HIGH (ref 1–3.3)
MACROCYTES BLD QL: SLIGHT — SIGNIFICANT CHANGE UP
MAGNESIUM SERPL-MCNC: 2.1 MG/DL — SIGNIFICANT CHANGE UP (ref 1.6–2.6)
MANUAL SMEAR VERIFICATION: YES — SIGNIFICANT CHANGE UP
MCHC RBC-ENTMCNC: 30.2 PG — SIGNIFICANT CHANGE UP (ref 27–34)
MCHC RBC-ENTMCNC: 36.9 G/DL — HIGH (ref 32–36)
MCV RBC AUTO: 81.7 FL — SIGNIFICANT CHANGE UP (ref 80–100)
MICROCYTES BLD QL: SLIGHT — SIGNIFICANT CHANGE UP
MONOCYTES # BLD AUTO: 1.66 K/UL — HIGH (ref 0–0.9)
MONOCYTES NFR BLD AUTO: 12 % — SIGNIFICANT CHANGE UP (ref 2–14)
NEUTROPHILS # BLD AUTO: 8.39 K/UL — HIGH (ref 1.8–7.4)
NEUTROPHILS NFR BLD AUTO: 60.6 % — SIGNIFICANT CHANGE UP (ref 43–77)
NRBC # BLD: 4 /100 WBCS — HIGH (ref 0–0)
NT-PROBNP SERPL-SCNC: 2118 PG/ML — HIGH (ref 0–125)
OVALOCYTES BLD QL SMEAR: SLIGHT — SIGNIFICANT CHANGE UP
PCO2 BLDV: 39 MMHG — LOW (ref 42–55)
PH BLDV: 7.41 — SIGNIFICANT CHANGE UP (ref 7.32–7.43)
PLAT MORPH BLD: NORMAL — SIGNIFICANT CHANGE UP
PLATELET # BLD AUTO: 368 K/UL — SIGNIFICANT CHANGE UP (ref 150–400)
PO2 BLDV: 32 MMHG — SIGNIFICANT CHANGE UP (ref 25–45)
POIKILOCYTOSIS BLD QL AUTO: SLIGHT — SIGNIFICANT CHANGE UP
POLYCHROMASIA BLD QL SMEAR: SLIGHT — SIGNIFICANT CHANGE UP
POTASSIUM BLDV-SCNC: 4.3 MMOL/L — SIGNIFICANT CHANGE UP (ref 3.5–5.1)
POTASSIUM SERPL-MCNC: 3.7 MMOL/L — SIGNIFICANT CHANGE UP (ref 3.5–5.3)
POTASSIUM SERPL-SCNC: 3.7 MMOL/L — SIGNIFICANT CHANGE UP (ref 3.5–5.3)
PROT SERPL-MCNC: 7.1 GM/DL — SIGNIFICANT CHANGE UP (ref 6–8.3)
RBC # BLD: 3.78 M/UL — LOW (ref 4.2–5.8)
RBC # FLD: 22.2 % — HIGH (ref 10.3–14.5)
RBC BLD AUTO: ABNORMAL
SAO2 % BLDV: 38.1 % — LOW (ref 94–98)
SARS-COV-2 RNA SPEC QL NAA+PROBE: SIGNIFICANT CHANGE UP
SCHISTOCYTES BLD QL AUTO: SLIGHT — SIGNIFICANT CHANGE UP
SODIUM SERPL-SCNC: 140 MMOL/L — SIGNIFICANT CHANGE UP (ref 135–145)
TARGETS BLD QL SMEAR: SLIGHT — SIGNIFICANT CHANGE UP
TROPONIN I, HIGH SENSITIVITY RESULT: 627.4 NG/L — HIGH
TROPONIN I, HIGH SENSITIVITY RESULT: 660.4 NG/L — HIGH
WBC # BLD: 13.86 K/UL — HIGH (ref 3.8–10.5)
WBC # FLD AUTO: 13.86 K/UL — HIGH (ref 3.8–10.5)

## 2022-05-08 PROCEDURE — 99285 EMERGENCY DEPT VISIT HI MDM: CPT

## 2022-05-08 PROCEDURE — 93010 ELECTROCARDIOGRAM REPORT: CPT

## 2022-05-08 PROCEDURE — 71275 CT ANGIOGRAPHY CHEST: CPT | Mod: 26,MA

## 2022-05-08 RX ORDER — PANTOPRAZOLE SODIUM 20 MG/1
40 TABLET, DELAYED RELEASE ORAL
Refills: 0 | Status: DISCONTINUED | OUTPATIENT
Start: 2022-05-08 | End: 2022-05-09

## 2022-05-08 RX ORDER — CEFTRIAXONE 500 MG/1
1000 INJECTION, POWDER, FOR SOLUTION INTRAMUSCULAR; INTRAVENOUS ONCE
Refills: 0 | Status: COMPLETED | OUTPATIENT
Start: 2022-05-08 | End: 2022-05-09

## 2022-05-08 RX ORDER — ISOSORBIDE MONONITRATE 60 MG/1
30 TABLET, EXTENDED RELEASE ORAL DAILY
Refills: 0 | Status: DISCONTINUED | OUTPATIENT
Start: 2022-05-08 | End: 2022-05-09

## 2022-05-08 RX ORDER — CEFTRIAXONE 500 MG/1
1000 INJECTION, POWDER, FOR SOLUTION INTRAMUSCULAR; INTRAVENOUS ONCE
Refills: 0 | Status: COMPLETED | OUTPATIENT
Start: 2022-05-08 | End: 2022-05-08

## 2022-05-08 RX ORDER — CEFTRIAXONE 500 MG/1
INJECTION, POWDER, FOR SOLUTION INTRAMUSCULAR; INTRAVENOUS
Refills: 0 | Status: COMPLETED | OUTPATIENT
Start: 2022-05-08 | End: 2022-05-13

## 2022-05-08 RX ORDER — METOPROLOL TARTRATE 50 MG
25 TABLET ORAL
Refills: 0 | Status: DISCONTINUED | OUTPATIENT
Start: 2022-05-08 | End: 2022-05-09

## 2022-05-08 RX ORDER — AZITHROMYCIN 500 MG/1
500 TABLET, FILM COATED ORAL EVERY 24 HOURS
Refills: 0 | Status: DISCONTINUED | OUTPATIENT
Start: 2022-05-09 | End: 2022-05-09

## 2022-05-08 RX ORDER — SODIUM CHLORIDE 9 MG/ML
1000 INJECTION INTRAMUSCULAR; INTRAVENOUS; SUBCUTANEOUS
Refills: 0 | Status: DISCONTINUED | OUTPATIENT
Start: 2022-05-08 | End: 2022-05-09

## 2022-05-08 RX ORDER — ATORVASTATIN CALCIUM 80 MG/1
40 TABLET, FILM COATED ORAL AT BEDTIME
Refills: 0 | Status: DISCONTINUED | OUTPATIENT
Start: 2022-05-08 | End: 2022-05-09

## 2022-05-08 RX ORDER — APIXABAN 2.5 MG/1
5 TABLET, FILM COATED ORAL
Refills: 0 | Status: DISCONTINUED | OUTPATIENT
Start: 2022-05-08 | End: 2022-05-09

## 2022-05-08 RX ORDER — AZITHROMYCIN 500 MG/1
TABLET, FILM COATED ORAL
Refills: 0 | Status: DISCONTINUED | OUTPATIENT
Start: 2022-05-08 | End: 2022-05-09

## 2022-05-08 RX ORDER — AZITHROMYCIN 500 MG/1
500 TABLET, FILM COATED ORAL ONCE
Refills: 0 | Status: COMPLETED | OUTPATIENT
Start: 2022-05-08 | End: 2022-05-08

## 2022-05-08 RX ADMIN — SODIUM CHLORIDE 70 MILLILITER(S): 9 INJECTION INTRAMUSCULAR; INTRAVENOUS; SUBCUTANEOUS at 23:00

## 2022-05-08 RX ADMIN — ATORVASTATIN CALCIUM 40 MILLIGRAM(S): 80 TABLET, FILM COATED ORAL at 21:29

## 2022-05-08 RX ADMIN — CEFTRIAXONE 100 MILLIGRAM(S): 500 INJECTION, POWDER, FOR SOLUTION INTRAMUSCULAR; INTRAVENOUS at 18:54

## 2022-05-08 RX ADMIN — AZITHROMYCIN 255 MILLIGRAM(S): 500 TABLET, FILM COATED ORAL at 20:21

## 2022-05-08 RX ADMIN — SODIUM CHLORIDE 70 MILLILITER(S): 9 INJECTION INTRAMUSCULAR; INTRAVENOUS; SUBCUTANEOUS at 18:53

## 2022-05-08 NOTE — H&P ADULT - NSHPPHYSICALEXAM_GEN_ALL_CORE
PHYSICAL EXAM:  GENERAL: NAD,   HEAD:  Atraumatic, Normocephalic  EYES:  PERRLA, conjunctiva and sclera clear  ENMT: Moist mucous membranes, No lesions  NECK: Supple,   NERVOUS SYSTEM:  Alert & Oriented X3; Motor Strength 5/5 B/L   CHEST/LUNG: BS decreased bilaterally; No rales, rhonchi, wheezing, or rubs  HEART: Tachy rate and rhythm; No murmurs, rubs, or gallops  ABDOMEN: Soft, Nontender, Nondistended; Bowel sounds present  EXTREMITIES:  No clubbing, cyanosis, or edema  SKIN: No rashes or lesions

## 2022-05-08 NOTE — PATIENT PROFILE ADULT - FALL HARM RISK - HARM RISK INTERVENTIONS

## 2022-05-08 NOTE — H&P ADULT - HISTORY OF PRESENT ILLNESS
51yo, BM with h/o Afib on Eliquis, who presents to the ED today for  palpitation x 1 week and intermittent SOB. States SOB worse with exertion, without associated orthopnea, PND. No lower extremity swelling. Denies hx of PE/DVT, no recent travel or recent surgery. Denies CP, fevers, chills, cough, congestion, abdominal pain, or sick contact.  In ER, troponin elevated 627

## 2022-05-08 NOTE — ED PROVIDER NOTE - OBJECTIVE STATEMENT
Pt is a 52 year old male Afib on Eliquis, who presents to the ED today for  palpitation x 1 week and intermittent SOB. States SOB worse with exertion, without associated orthopnea, PND. No lower extremity swelling. Denies hx of PE/DVT, no recent travel or recent surgery. Denies CP, fevers, chills, cough, congestion, abdominal pain, or sick contact.

## 2022-05-08 NOTE — ED PROVIDER NOTE - CLINICAL SUMMARY MEDICAL DECISION MAKING FREE TEXT BOX
Pneumonia   elevated troponin  EKG with diffuse T wave inversions, sinus tachycardia   admit to medicine

## 2022-05-08 NOTE — ED PROVIDER NOTE - RESPIRATORY, MLM
Breath sounds clear and equal bilaterally. normal work pf breathing, noted minimal hypoxia requiring 2 liter NC.

## 2022-05-08 NOTE — ED ADULT NURSE NOTE - NSIMPLEMENTINTERV_GEN_ALL_ED
Implemented All Fall Risk Interventions:  [place] to call system. Call bell, personal items and telephone within reach. Instruct patient to call for assistance. Room bathroom lighting operational. Non-slip footwear when patient is off stretcher. Physically safe environment: no spills, clutter or unnecessary equipment. Stretcher in lowest position, wheels locked, appropriate side rails in place. Provide visual cue, wrist band, yellow gown, etc. Monitor gait and stability. Monitor for mental status changes and reorient to person, place, and time. Review medications for side effects contributing to fall risk. Reinforce activity limits and safety measures with patient and family.

## 2022-05-08 NOTE — ED ADULT TRIAGE NOTE - CHIEF COMPLAINT QUOTE
intermittent palpations upon exertion O2 Sat 87%RA intermittent palpations upon exertion O2 Sat 87%RA IV right arm by EMS

## 2022-05-08 NOTE — ED ADULT NURSE NOTE - OBJECTIVE STATEMENT
pt c/o palpitations x 1 week. pt c/o intermittent dizziness, sob. pt denies chest pain, cough, fever, chills.

## 2022-05-08 NOTE — H&P ADULT - NSHPLABSRESULTS_GEN_ALL_CORE
LABS:                        11.4   13.86 )-----------( 368      ( 08 May 2022 17:55 )             30.9     05-08    140  |  110<H>  |  14  ----------------------------<  95  3.7   |  27  |  1.27    Ca    8.3<L>      08 May 2022 17:55  Mg     2.1     05-08    TPro  7.1  /  Alb  3.4  /  TBili  2.5<H>  /  DBili  x   /  AST  60<H>  /  ALT  58  /  AlkPhos  94  05-08    Serum Pro-Brain Natriuretic Peptide: 2118 pg/mL (05.08.22 @ 17:55)   Troponin I, High Sensitivity Result: 627.4: TYPE:(C=Critical, N=Notification, A=Abnormal) N       < from: CT Angio Chest PE Protocol w/ IV Cont (05.08.22 @ 17:26) >    FINDINGS:    PULMONARY VESSELS: No pulmonary embolus. Main pulmonary artery is dilated   representing pulmonary hypertension..    HEART AND VASCULATURE: Heart size is normal. No pericardial effusion. No   aortic aneurysm or dissection.    LUNGS, AIRWAYS, PLEURA: Patent central airways. In comparison with   7/2/2021, new/increased perihilar-peribronchovascular opacities within   mid to upper lungs. No pleural effusions or pneumothorax.    MEDIASTINUM: No mass or lymphadenopathy.    UPPER ABDOMEN: Atrophic spleen.    BONES AND SOFT TISSUES: No aggressive osseous lesion.    LOWER NECK: Within normal limits.    IMPRESSION:    No pulmonary embolus. Dilated main pulmonary arteries suggestive of   pulmonary hypertension.    In comparison with 7/2/2021, new/increased perihilar-peribronchovascular   opacities within mid to upper lungs. These are nonspecific and can   represent acute chest syndrome, edema, or infection.    < end of copied text >

## 2022-05-09 ENCOUNTER — TRANSCRIPTION ENCOUNTER (OUTPATIENT)
Age: 52
End: 2022-05-09

## 2022-05-09 VITALS
HEART RATE: 95 BPM | RESPIRATION RATE: 19 BRPM | DIASTOLIC BLOOD PRESSURE: 61 MMHG | OXYGEN SATURATION: 93 % | SYSTOLIC BLOOD PRESSURE: 97 MMHG

## 2022-05-09 LAB
ANION GAP SERPL CALC-SCNC: 3 MMOL/L — LOW (ref 5–17)
BUN SERPL-MCNC: 12 MG/DL — SIGNIFICANT CHANGE UP (ref 7–23)
CALCIUM SERPL-MCNC: 8 MG/DL — LOW (ref 8.5–10.1)
CHLORIDE SERPL-SCNC: 115 MMOL/L — HIGH (ref 96–108)
CHOLEST SERPL-MCNC: 79 MG/DL — SIGNIFICANT CHANGE UP
CO2 SERPL-SCNC: 26 MMOL/L — SIGNIFICANT CHANGE UP (ref 22–31)
CREAT SERPL-MCNC: 0.99 MG/DL — SIGNIFICANT CHANGE UP (ref 0.5–1.3)
EGFR: 93 ML/MIN/1.73M2 — SIGNIFICANT CHANGE UP
GLUCOSE SERPL-MCNC: 124 MG/DL — HIGH (ref 70–99)
HCT VFR BLD CALC: 28 % — LOW (ref 39–50)
HDLC SERPL-MCNC: 20 MG/DL — LOW
HGB BLD-MCNC: 10.2 G/DL — LOW (ref 13–17)
LIPID PNL WITH DIRECT LDL SERPL: 41 MG/DL — SIGNIFICANT CHANGE UP
MCHC RBC-ENTMCNC: 30.1 PG — SIGNIFICANT CHANGE UP (ref 27–34)
MCHC RBC-ENTMCNC: 36.4 G/DL — HIGH (ref 32–36)
MCV RBC AUTO: 82.6 FL — SIGNIFICANT CHANGE UP (ref 80–100)
NON HDL CHOLESTEROL: 59 MG/DL — SIGNIFICANT CHANGE UP
NRBC # BLD: 5 /100 WBCS — HIGH (ref 0–0)
PLATELET # BLD AUTO: 330 K/UL — SIGNIFICANT CHANGE UP (ref 150–400)
POTASSIUM SERPL-MCNC: 3.7 MMOL/L — SIGNIFICANT CHANGE UP (ref 3.5–5.3)
POTASSIUM SERPL-SCNC: 3.7 MMOL/L — SIGNIFICANT CHANGE UP (ref 3.5–5.3)
PROCALCITONIN SERPL-MCNC: 0.09 NG/ML — SIGNIFICANT CHANGE UP (ref 0.02–0.1)
RBC # BLD: 3.39 M/UL — LOW (ref 4.2–5.8)
RBC # FLD: 21.3 % — HIGH (ref 10.3–14.5)
SODIUM SERPL-SCNC: 144 MMOL/L — SIGNIFICANT CHANGE UP (ref 135–145)
TRIGL SERPL-MCNC: 89 MG/DL — SIGNIFICANT CHANGE UP
TROPONIN I, HIGH SENSITIVITY RESULT: 502.9 NG/L — HIGH
WBC # BLD: 11.74 K/UL — HIGH (ref 3.8–10.5)
WBC # FLD AUTO: 11.74 K/UL — HIGH (ref 3.8–10.5)

## 2022-05-09 PROCEDURE — 93010 ELECTROCARDIOGRAM REPORT: CPT

## 2022-05-09 PROCEDURE — 93306 TTE W/DOPPLER COMPLETE: CPT | Mod: 26

## 2022-05-09 RX ORDER — ATORVASTATIN CALCIUM 80 MG/1
1 TABLET, FILM COATED ORAL
Qty: 30 | Refills: 0
Start: 2022-05-09 | End: 2022-06-07

## 2022-05-09 RX ORDER — METOPROLOL TARTRATE 50 MG
1 TABLET ORAL
Qty: 60 | Refills: 0
Start: 2022-05-09 | End: 2022-06-07

## 2022-05-09 RX ORDER — ISOSORBIDE MONONITRATE 60 MG/1
30 TABLET, EXTENDED RELEASE ORAL DAILY
Refills: 0 | Status: DISCONTINUED | OUTPATIENT
Start: 2022-05-09 | End: 2022-05-09

## 2022-05-09 RX ORDER — AZITHROMYCIN 500 MG/1
0 TABLET, FILM COATED ORAL
Qty: 0 | Refills: 0 | DISCHARGE
Start: 2022-05-09 | End: 2022-05-12

## 2022-05-09 RX ORDER — PANTOPRAZOLE SODIUM 20 MG/1
1 TABLET, DELAYED RELEASE ORAL
Qty: 0 | Refills: 0 | DISCHARGE
Start: 2022-05-09

## 2022-05-09 RX ORDER — ISOSORBIDE MONONITRATE 60 MG/1
1 TABLET, EXTENDED RELEASE ORAL
Qty: 0 | Refills: 0 | DISCHARGE
Start: 2022-05-09

## 2022-05-09 RX ADMIN — CEFTRIAXONE 100 MILLIGRAM(S): 500 INJECTION, POWDER, FOR SOLUTION INTRAMUSCULAR; INTRAVENOUS at 05:43

## 2022-05-09 RX ADMIN — Medication 25 MILLIGRAM(S): at 05:42

## 2022-05-09 RX ADMIN — PANTOPRAZOLE SODIUM 40 MILLIGRAM(S): 20 TABLET, DELAYED RELEASE ORAL at 05:43

## 2022-05-09 RX ADMIN — APIXABAN 5 MILLIGRAM(S): 2.5 TABLET, FILM COATED ORAL at 05:43

## 2022-05-09 NOTE — DISCHARGE NOTE PROVIDER - NSDCMRMEDTOKEN_GEN_ALL_CORE_FT
atorvastatin 40 mg oral tablet: 1 tab(s) orally once a day (at bedtime)  Eliquis 5 mg oral tablet: 1 tab(s) orally 2 times a day   isosorbide mononitrate 30 mg oral tablet, extended release: 1 tab(s) orally once a day  metoprolol tartrate 25 mg oral tablet: 1 tab(s) orally 2 times a day  pantoprazole 40 mg oral delayed release tablet: 1 tab(s) orally once a day (before a meal)

## 2022-05-09 NOTE — PROGRESS NOTE ADULT - PROBLEM SELECTOR PLAN 2
Cardiac consult noted; Transfer for cardiac cath.  ASA, Statin, Lopressor  CE  EKG  Monitoring    2/2 NSTEMI

## 2022-05-09 NOTE — DISCHARGE NOTE PROVIDER - NSDCCPCAREPLAN_GEN_ALL_CORE_FT
PRINCIPAL DISCHARGE DIAGNOSIS  Diagnosis: Pneumonia  Assessment and Plan of Treatment: course of IV antibiotics while in the hospital         SECONDARY DISCHARGE DIAGNOSES  Diagnosis: Elevated troponin  Assessment and Plan of Treatment: Concern for NSTEMI, for transfer to [facility] for Cardiac Cath.

## 2022-05-09 NOTE — DISCHARGE NOTE NURSING/CASE MANAGEMENT/SOCIAL WORK - PATIENT PORTAL LINK FT
You can access the FollowMyHealth Patient Portal offered by [facility] by registering at the following website: [email or web address]. By joining [facility]’s FollowMyHealth portal, you will also be able to view your health information using other applications (apps) compatible with our system.

## 2022-05-09 NOTE — DISCHARGE NOTE NURSING/CASE MANAGEMENT/SOCIAL WORK - NSDCPEFALRISK_GEN_ALL_CORE
For information on Fall & Injury Prevention, visit: [email or web address] OR  [email or web address] OR  https://www.cdc.gov/steadi/patient.html

## 2022-05-09 NOTE — DISCHARGE NOTE PROVIDER - HOSPITAL COURSE
53yo, BM with h/o Afib on Eliquis, who presents to the ED today for  palpitation x 1 week and intermittent SOB. States SOB worse with exertion, without associated orthopnea, PND. No lower extremity swelling. Denies hx of PE/DVT, no recent travel or recent surgery. Denies CP, fevers, chills, cough, congestion, abdominal pain, or sick contact. In ER, troponin elevated 627, EKG with T wave inversions. Patient evaluated by Cardiology, with recommendations for transfer for Cardiac Cath. Patient currently HD stable, afebrile, without active chest pain.

## 2022-05-09 NOTE — PROGRESS NOTE ADULT - SUBJECTIVE AND OBJECTIVE BOX
Patient is a 52y old  Male who presents with a chief complaint of Shortness of breath and palpitations. NSTEMI (09 May 2022 12:00)      SUBJECTIVE/OBJECTIVE:    Without complaints. Patient resting comfortably.     MEDICATIONS  (STANDING):  apixaban 5 milliGRAM(s) Oral two times a day  atorvastatin 40 milliGRAM(s) Oral at bedtime  azithromycin  IVPB      azithromycin  IVPB 500 milliGRAM(s) IV Intermittent every 24 hours  isosorbide   mononitrate ER Tablet (IMDUR) 30 milliGRAM(s) Oral daily  metoprolol tartrate 25 milliGRAM(s) Oral two times a day  pantoprazole    Tablet 40 milliGRAM(s) Oral before breakfast  sodium chloride 0.9%. 1000 milliLiter(s) (70 mL/Hr) IV Continuous <Continuous>    MEDICATIONS  (PRN):      Allergies    No Known Allergies    Intolerances          Vital Signs Last 24 Hrs  T(C): 37.2 (09 May 2022 05:07), Max: 38.1 (08 May 2022 17:38)  T(F): 98.9 (09 May 2022 05:07), Max: 100.5 (08 May 2022 17:38)  HR: 95 (09 May 2022 10:36) (91 - 113)  BP: 97/61 (09 May 2022 10:36) (97/61 - 134/90)  BP(mean): --  RR: 19 (09 May 2022 10:36) (19 - 30)  SpO2: 93% (09 May 2022 10:36) (87% - 98%)    PHYSICAL EXAM:  GENERAL: NAD, well-groomed, well-developed  HEAD:  Atraumatic, Normocephalic  EYES: EOMI, PERRLA, conjunctiva and sclera clear  ENMT: No tonsillar erythema, exudates, or enlargement; Moist mucous membranes, No lesions  NECK: Supple, No JVD, Normal thyroid  NERVOUS SYSTEM:  Alert & Oriented X3; Motor Strength 5/5 B/L upper and lower extremities; DTRs 2+ intact and symmetric  CHEST/LUNG: Clear bilaterally; No rales, rhonchi, wheezing, or rubs  HEART: Regular rate and rhythm; No murmurs, rubs, or gallops  ABDOMEN: Soft, Nontender, Nondistended; Bowel sounds present  EXTREMITIES:  2+ Peripheral Pulses, No clubbing, cyanosis, or edema  LYMPH: No lymphadenopathy noted  SKIN: No rashes or lesions    LABS:                        10.2   11.74 )-----------( 330      ( 09 May 2022 07:20 )             28.0     05-09    144  |  115<H>  |  12  ----------------------------<  124<H>  3.7   |  26  |  0.99    Ca    8.0<L>      09 May 2022 07:20  Mg     2.1     05-08    TPro  7.1  /  Alb  3.4  /  TBili  2.5<H>  /  DBili  x   /  AST  60<H>  /  ALT  58  /  AlkPhos  94  05-08        CAPILLARY BLOOD GLUCOSE  Troponin I, High Sensitivity Result: 502.9: Serial measurements of high sensitivity troponin I (hs TnI) showing   Troponin I, High Sensitivity Result: 660.4: Serial measurements of high sensitivity troponin I (hs TnI) showing           RADIOLOGY & ADDITIONAL TESTS:        Consultant(s) Notes Reviewed:  [ xxx] YES  [ ] NO

## 2022-05-09 NOTE — CHART NOTE - NSCHARTNOTEFT_GEN_A_CORE
Notified by RN of critical value- elevated troponin 660.4 trending up from 624.4. Patient seen at the bedside and denies CP, palpitations or SOB.  Stat EKG  Repeat troponin @5am  Cardiology consult pending Notified by RN of critical value- elevated troponin 660.4 (trending up) from 624.4. Patient seen at the bedside. Admits to SOB and denies CP or palpitations.  Stat EKG- no changes from prior EKG done in ED  Repeat troponin @5am  Continue supplemental O2  Cardiology consult pending

## 2022-05-09 NOTE — CONSULT NOTE ADULT - SUBJECTIVE AND OBJECTIVE BOX
Patient is a 52y old  Male who presents with a chief complaint of Shortness of breath and palpitations. (08 May 2022 18:41)        PAST MEDICAL & SURGICAL HISTORY:  Myocardial infarct    Bilateral pulmonary embolism  Feb 2020    H/O sickle cell trait    S/P wrist surgery  Left wrist        Summary of admission HPI:                PREVIOUS DIAGNOSTIC TESTING:      ECHO  FINDINGS:    STRESS  FINDINGS:    CATHETERIZATION  FINDINGS:    ELECTROPHYSIOLOGY STUDY  FINDINGS:    CAROTID ULTRASOUND:  FINDINGS    VENOUS DUPLEX SCAN:  FINDINGS:    CHEST CT PULMONARY ANGIO with IV Contrast:  FINDINGS:  MEDICATIONS  (STANDING):  apixaban 5 milliGRAM(s) Oral two times a day  atorvastatin 40 milliGRAM(s) Oral at bedtime  azithromycin  IVPB      azithromycin  IVPB 500 milliGRAM(s) IV Intermittent every 24 hours  isosorbide   mononitrate ER Tablet (IMDUR) 30 milliGRAM(s) Oral daily  metoprolol tartrate 25 milliGRAM(s) Oral two times a day  pantoprazole    Tablet 40 milliGRAM(s) Oral before breakfast  sodium chloride 0.9%. 1000 milliLiter(s) (70 mL/Hr) IV Continuous <Continuous>    MEDICATIONS  (PRN):      FAMILY HISTORY:  Family history of cancer in mother (Mother)  Breast cancer    FH: prostate cancer (Father)  Brother, passed away at 54        SOCIAL HISTORY:    CIGARETTES:    ALCOHOL:    REVIEW OF SYSTEMS:    CONSTITUTIONAL: No fever, weight loss, chills, shakes, or fatigue  EYES: No eye pain, visual disturbances, or discharge  ENMT:  No difficulty hearing, tinnitus, vertigo; No sinus or throat pain  NECK: No pain or stiffness  BREASTS: No pain, masses, or nipple discharge  RESPIRATORY: No cough, wheezing, hemoptysis, or shortness of breath  CARDIOVASCULAR: No chest pain, dyspnea, palpitations, dizziness, syncope, paroxysmal nocturnal dyspnea, orthopnea, or arm or leg swelling  GASTROINTESTINAL: No abdominal  or epigastric pain, nausea, vomiting, hematemesis, diarrhea, constipation, melena or bright red blood.  GENITOURINARY: No dysuria, nocturia, hematuria, or urinary incontinence  NEUROLOGICAL: No headaches, memory loss, slurred speech, limb weakness, loss of strength, numbness, or tremors  SKIN: No itching, burning, rashes, or lesions   LYMPH NODES: No enlarged glands  ENDOCRINE: No heat or cold intolerance, or hair loss  MUSCULOSKELETAL: No joint pain or swelling, muscle, back, or extremity pain  PSYCHIATRIC: No depression, anxiety, or difficulty sleeping  HEME/LYMPH: No easy bruising or bleeding gums  ALLERY AND IMMUNOLOGIC: No hives or rash.      Vital Signs Last 24 Hrs  T(C): 37.2 (09 May 2022 05:07), Max: 38.1 (08 May 2022 17:38)  T(F): 98.9 (09 May 2022 05:07), Max: 100.5 (08 May 2022 17:38)  HR: 95 (09 May 2022 10:36) (91 - 113)  BP: 97/61 (09 May 2022 10:36) (97/61 - 134/90)  BP(mean): --  RR: 19 (09 May 2022 10:36) (19 - 30)  SpO2: 93% (09 May 2022 10:36) (87% - 98%)    PHYSICAL EXAM:    GENERAL: In no apparent distress, well nourished, and hydrated.  HEAD:  Atraumatic, Normocephalic  EYES: EOMI, PERRLA, conjunctiva and sclera clear  ENMT: No tonsillar erythema, exudates, or enlargement; Moist mucous membranes, Good dentition, No lesions  NECK: Supple and normal thyroid.  No JVD or carotid bruit.  Carotid pulse is 2+ bilaterally.  HEART: Regular rate and rhythm; No murmurs, rubs, or gallops.  PULMONARY: Clear to auscultation and perfusion.  No rales, wheezing, or rhonchi bilaterally.  ABDOMEN: Soft, Nontender, Nondistended; Bowel sounds present  EXTREMITIES:  2+ Peripheral Pulses, No clubbing, cyanosis, or edema  LYMPH: No lymphadenopathy noted  NEUROLOGICAL: Grossly nonfocal          INTERPRETATION OF TELEMETRY:    ECG:    I&O's Detail    08 May 2022 07:01  -  09 May 2022 07:00  --------------------------------------------------------  IN:    sodium chloride 0.9%: 560 mL  Total IN: 560 mL    OUT:    Voided (mL): 600 mL  Total OUT: 600 mL    Total NET: -40 mL          LABS:                        10.2   11.74 )-----------( 330      ( 09 May 2022 07:20 )             28.0     05-09    144  |  115<H>  |  12  ----------------------------<  124<H>  3.7   |  26  |  0.99    Ca    8.0<L>      09 May 2022 07:20  Mg     2.1     05-08    TPro  7.1  /  Alb  3.4  /  TBili  2.5<H>  /  DBili  x   /  AST  60<H>  /  ALT  58  /  AlkPhos  94  05-08            BNPSerum Pro-Brain Natriuretic Peptide: 2118 pg/mL (05-08 @ 17:55)    I&O's Detail    08 May 2022 07:01  -  09 May 2022 07:00  --------------------------------------------------------  IN:    sodium chloride 0.9%: 560 mL  Total IN: 560 mL    OUT:    Voided (mL): 600 mL  Total OUT: 600 mL    Total NET: -40 mL        Daily Height in cm: 182.88 (08 May 2022 16:46)    Daily     RADIOLOGY & ADDITIONAL STUDIES:

## 2022-05-09 NOTE — DISCHARGE NOTE NURSING/CASE MANAGEMENT/SOCIAL WORK - NSDCVIVACCINE_GEN_ALL_CORE_FT
influenza, injectable, quadrivalent, preservative free; 06-Mar-2019 16:01; Karla Whitney (RN); [employer]; [id number] (Exp. Date: 30-Jun-2019); IntraMuscular; Deltoid Left.; 0.5 milliLiter(s); VIS (VIS Published: 07-Aug-2015, VIS Presented: 06-Mar-2019);

## 2022-05-09 NOTE — DISCHARGE NOTE PROVIDER - CARE PROVIDER_API CALL
Jonathan Devine  INTERNAL MEDICINE  [address], [address]  [place] [address]  Phone: [phone]  Fax: [phone]  Follow Up Time:

## 2022-05-09 NOTE — DISCHARGE NOTE PROVIDER - NSDCFUADDAPPT_GEN_ALL_CORE_FT
It is important to see your primary physician as well as the physicians noted below within the next week to perform a comprehensive medical review.  Call their offices for an appointment as soon as you leave the hospital.  You will also need to see them for renewal of your medications.  If you do not have a primary physician, contact the [facility] Physician Referral Service at [phone].  To obtain your results, you can access the FollowLocalCustomer Patient Portal at [email or web address].  Your medical issues appear to be stable at this time, but if your symptoms recur or worsen, contact your physicians and/or return to the hospital if necessary.  If you encounter any issues or questions with your medication, call your physicians before stopping the medication.

## 2022-05-14 LAB
CULTURE RESULTS: SIGNIFICANT CHANGE UP
SPECIMEN SOURCE: SIGNIFICANT CHANGE UP

## 2022-05-15 DIAGNOSIS — I48.0 PAROXYSMAL ATRIAL FIBRILLATION: ICD-10-CM

## 2022-05-15 DIAGNOSIS — R77.8 OTHER SPECIFIED ABNORMALITIES OF PLASMA PROTEINS: ICD-10-CM

## 2022-05-15 DIAGNOSIS — Z86.711 PERSONAL HISTORY OF PULMONARY EMBOLISM: ICD-10-CM

## 2022-05-15 DIAGNOSIS — D57.3 SICKLE-CELL TRAIT: ICD-10-CM

## 2022-05-15 DIAGNOSIS — I25.2 OLD MYOCARDIAL INFARCTION: ICD-10-CM

## 2022-05-15 DIAGNOSIS — J18.9 PNEUMONIA, UNSPECIFIED ORGANISM: ICD-10-CM

## 2022-05-15 DIAGNOSIS — Z79.01 LONG TERM (CURRENT) USE OF ANTICOAGULANTS: ICD-10-CM

## 2022-05-15 DIAGNOSIS — R06.02 SHORTNESS OF BREATH: ICD-10-CM

## 2022-05-15 DIAGNOSIS — J96.01 ACUTE RESPIRATORY FAILURE WITH HYPOXIA: ICD-10-CM

## 2023-08-29 NOTE — H&P ADULT - PROBLEM/PLAN-4
-- DO NOT REPLY / DO NOT REPLY ALL --  -- Message is from Engagement Center Operations (ECO) --    ONLY TO BE USED WITHIN A REFILL MEDICATION ENCOUNTER    Med Refill  Is the patient currently having any symptoms?: No/Non-Emergent symptoms    Name of medication requested: See pended med    Has patient contacted the pharmacy? Yes    Is this the first request for the medication in the last 48 hours?: Yes    Patient is requesting a medication refill - medication is on active medication list    Patient is currently OUT of the requested medication - sent as HIGH priority      Full name of the provider who ordered the medication: Víctor Palomo MD     Clinic site name / Account # for provider: [facility]    Preferred Pharmacy: Pharmacy  [facility] - [facility] [address]    Patient confirmed the above pharmacy as correct?  Yes      Caller Information       Type Contact Phone/Fax    08/29/2023 08:58 AM CDT Phone (Incoming) Demarco Betts (Self) [phone] (M)          Alternative phone number: n/a    Can a detailed message be left?: Yes    Patient is completely out of medication: Verify if patient is currently experiencing symptoms. If patient is symptomatic, proceed with front end triage instead of medication refill. If patient is not symptomatic but is completely out of medication, ministerio as High priority when routing. Inform patient: “Please call back with any questions or concerns and if your condition becomes life threatening, you should seek immediate medical assistance by calling 911 or going to the Emergency Department for evaluation.”    Inform all patients: \"If the clinical team needs to contact you regarding this refill, please be aware the return phone call may come from an unidentified or out of state phone number and your refill request will be addressed as soon as the clinical team reviews your message.\"  
Ok to refill levothyroxine? Had labs done during recent hospitalization           
DISPLAY PLAN FREE TEXT

## 2023-09-18 NOTE — H&P ADULT - NSICDXFAMILYHX_GEN_ALL_CORE_FT
Called patient to ask if they have received a  pneumonia vaccine, no answer, could not leave voicemail at the moment. Will try again later.   
FAMILY HISTORY:  Family history of cancer in mother, Unclear what type of cancer

## 2023-12-05 NOTE — H&P ADULT - ASSESSMENT
Patient called again in regards to message, message from provider in chart relayed to patient. Patient verbalized understanding and had no further questions.
yes/pt informed
50 yo M w/ no significant PMH p/w hiccups fth sepsis 2/2 entero/rhinovirus infection

## 2024-05-08 NOTE — H&P ADULT - PROBLEM/PLAN-1
Reason for Call: Reminder to Report Blood Sugars (Last Reported BG: 3/16/24 via [employer] Glucose Flowsheet /) and Diabetes Type 2 (Currently Pregnant; 27w3d)    Outcome: Spoke with patient. She explains that she received three Dexcoms from the pharmacy but that the first one did not work. She did place a new Dexcom which is currently working. She has not tried to share her data yet. She plans on trying to share her data at some point today. She has been doing a few finger-sticks in the morning and she reports that they match her Dexcom. Pt has no other questions at this time.    - Instructed to reach out to Dexcom customer service for a replacement  - Encouraged to continue testing her blood sugars as instructed  - Explained that she must use the Dexcom clarity tita to share her data. Encouraged to reach out if they has any difficulties.    Duration: 4 minutes    Lorene Mazariegos RD  Diabetes Educator   [facility]  Diabetes and Pregnancy [facility]   DISPLAY PLAN FREE TEXT

## 2024-07-23 NOTE — H&P ADULT - NEGATIVE GASTROINTESTINAL SYMPTOMS
[Joint Pain] : joint pain [Negative] : Heme/Lymph no diarrhea/no nausea/no vomiting/no abdominal pain

## 2024-09-12 NOTE — ED PROVIDER NOTE - GASTROINTESTINAL, MLM
[FreeTextEntry3] : I, Dr. Hernandes, personally performed the evaluation and management (E/M) services for this new patient.  That E/M includes conducting the clinically appropriate initial history &/or exam, assessing all conditions, and establishing the plan of care.  Today, my REENA, Devin Mosquera, was here to observe my evaluation and management service for this patient & follow plan of care established by me going forward.    Abdomen soft, non-tender, no guarding.

## 2025-01-21 NOTE — H&P ADULT - NEGATIVE NEUROLOGICAL SYMPTOMS
Addended by: FELIPE MILLER on: 1/21/2025 07:25 AM     Modules accepted: Orders     no weakness/no transient paralysis/no paresthesias